# Patient Record
Sex: MALE | Race: BLACK OR AFRICAN AMERICAN | NOT HISPANIC OR LATINO | Employment: FULL TIME | URBAN - METROPOLITAN AREA
[De-identification: names, ages, dates, MRNs, and addresses within clinical notes are randomized per-mention and may not be internally consistent; named-entity substitution may affect disease eponyms.]

---

## 2022-07-26 ENCOUNTER — APPOINTMENT (OUTPATIENT)
Dept: RADIOLOGY | Facility: MEDICAL CENTER | Age: 47
DRG: 291 | End: 2022-07-26
Attending: EMERGENCY MEDICINE

## 2022-07-26 ENCOUNTER — HOSPITAL ENCOUNTER (INPATIENT)
Facility: MEDICAL CENTER | Age: 47
LOS: 1 days | DRG: 291 | End: 2022-07-27
Attending: EMERGENCY MEDICINE | Admitting: HOSPITALIST

## 2022-07-26 DIAGNOSIS — R06.00 DYSPNEA, UNSPECIFIED TYPE: ICD-10-CM

## 2022-07-26 DIAGNOSIS — E87.70 HYPERVOLEMIA, UNSPECIFIED HYPERVOLEMIA TYPE: ICD-10-CM

## 2022-07-26 DIAGNOSIS — I10 HYPERTENSION, UNSPECIFIED TYPE: ICD-10-CM

## 2022-07-26 DIAGNOSIS — I50.21 ACUTE SYSTOLIC HEART FAILURE (HCC): ICD-10-CM

## 2022-07-26 PROBLEM — R06.02 SHORTNESS OF BREATH: Status: ACTIVE | Noted: 2022-07-26

## 2022-07-26 PROBLEM — I16.0 HYPERTENSIVE URGENCY: Status: ACTIVE | Noted: 2022-07-26

## 2022-07-26 PROBLEM — R51.9 HEADACHE: Status: ACTIVE | Noted: 2022-07-26

## 2022-07-26 PROBLEM — F17.200 SMOKER: Status: ACTIVE | Noted: 2022-07-26

## 2022-07-26 PROBLEM — M79.89 LEG SWELLING: Status: ACTIVE | Noted: 2022-07-26

## 2022-07-26 LAB
ALBUMIN SERPL BCP-MCNC: 4.1 G/DL (ref 3.2–4.9)
ALBUMIN/GLOB SERPL: 1.4 G/DL
ALP SERPL-CCNC: 56 U/L (ref 30–99)
ALT SERPL-CCNC: 28 U/L (ref 2–50)
ANION GAP SERPL CALC-SCNC: 9 MMOL/L (ref 7–16)
AST SERPL-CCNC: 24 U/L (ref 12–45)
BASOPHILS # BLD AUTO: 0.7 % (ref 0–1.8)
BASOPHILS # BLD: 0.06 K/UL (ref 0–0.12)
BILIRUB SERPL-MCNC: 0.6 MG/DL (ref 0.1–1.5)
BUN SERPL-MCNC: 34 MG/DL (ref 8–22)
CALCIUM SERPL-MCNC: 8.9 MG/DL (ref 8.5–10.5)
CHLORIDE SERPL-SCNC: 107 MMOL/L (ref 96–112)
CO2 SERPL-SCNC: 26 MMOL/L (ref 20–33)
CREAT SERPL-MCNC: 1.34 MG/DL (ref 0.5–1.4)
EKG IMPRESSION: NORMAL
EOSINOPHIL # BLD AUTO: 0.4 K/UL (ref 0–0.51)
EOSINOPHIL NFR BLD: 4.7 % (ref 0–6.9)
ERYTHROCYTE [DISTWIDTH] IN BLOOD BY AUTOMATED COUNT: 43.8 FL (ref 35.9–50)
EST. AVERAGE GLUCOSE BLD GHB EST-MCNC: 117 MG/DL
FLUAV RNA SPEC QL NAA+PROBE: NEGATIVE
FLUBV RNA SPEC QL NAA+PROBE: NEGATIVE
GFR SERPLBLD CREATININE-BSD FMLA CKD-EPI: 66 ML/MIN/1.73 M 2
GLOBULIN SER CALC-MCNC: 2.9 G/DL (ref 1.9–3.5)
GLUCOSE SERPL-MCNC: 99 MG/DL (ref 65–99)
HBA1C MFR BLD: 5.7 % (ref 4–5.6)
HCT VFR BLD AUTO: 47.3 % (ref 42–52)
HGB BLD-MCNC: 15.7 G/DL (ref 14–18)
IMM GRANULOCYTES # BLD AUTO: 0.03 K/UL (ref 0–0.11)
IMM GRANULOCYTES NFR BLD AUTO: 0.4 % (ref 0–0.9)
LYMPHOCYTES # BLD AUTO: 2.1 K/UL (ref 1–4.8)
LYMPHOCYTES NFR BLD: 24.7 % (ref 22–41)
MCH RBC QN AUTO: 27.5 PG (ref 27–33)
MCHC RBC AUTO-ENTMCNC: 33.2 G/DL (ref 33.7–35.3)
MCV RBC AUTO: 83 FL (ref 81.4–97.8)
MONOCYTES # BLD AUTO: 0.79 K/UL (ref 0–0.85)
MONOCYTES NFR BLD AUTO: 9.3 % (ref 0–13.4)
NEUTROPHILS # BLD AUTO: 5.13 K/UL (ref 1.82–7.42)
NEUTROPHILS NFR BLD: 60.2 % (ref 44–72)
NRBC # BLD AUTO: 0 K/UL
NRBC BLD-RTO: 0 /100 WBC
NT-PROBNP SERPL IA-MCNC: 2164 PG/ML (ref 0–125)
PLATELET # BLD AUTO: 200 K/UL (ref 164–446)
PMV BLD AUTO: 10.1 FL (ref 9–12.9)
POTASSIUM SERPL-SCNC: 4.3 MMOL/L (ref 3.6–5.5)
PROT SERPL-MCNC: 7 G/DL (ref 6–8.2)
RBC # BLD AUTO: 5.7 M/UL (ref 4.7–6.1)
RSV RNA SPEC QL NAA+PROBE: NEGATIVE
SARS-COV-2 RNA RESP QL NAA+PROBE: NOTDETECTED
SODIUM SERPL-SCNC: 142 MMOL/L (ref 135–145)
SPECIMEN SOURCE: NORMAL
TROPONIN T SERPL-MCNC: 27 NG/L (ref 6–19)
TSH SERPL DL<=0.005 MIU/L-ACNC: 1.74 UIU/ML (ref 0.38–5.33)
WBC # BLD AUTO: 8.5 K/UL (ref 4.8–10.8)

## 2022-07-26 PROCEDURE — 85025 COMPLETE CBC W/AUTO DIFF WBC: CPT

## 2022-07-26 PROCEDURE — 93005 ELECTROCARDIOGRAM TRACING: CPT

## 2022-07-26 PROCEDURE — 84443 ASSAY THYROID STIM HORMONE: CPT

## 2022-07-26 PROCEDURE — 36415 COLL VENOUS BLD VENIPUNCTURE: CPT

## 2022-07-26 PROCEDURE — 0241U HCHG SARS-COV-2 COVID-19 NFCT DS RESP RNA 4 TRGT MIC: CPT

## 2022-07-26 PROCEDURE — 71275 CT ANGIOGRAPHY CHEST: CPT

## 2022-07-26 PROCEDURE — 71045 X-RAY EXAM CHEST 1 VIEW: CPT

## 2022-07-26 PROCEDURE — 700102 HCHG RX REV CODE 250 W/ 637 OVERRIDE(OP): Performed by: NURSE PRACTITIONER

## 2022-07-26 PROCEDURE — 84484 ASSAY OF TROPONIN QUANT: CPT

## 2022-07-26 PROCEDURE — 700111 HCHG RX REV CODE 636 W/ 250 OVERRIDE (IP): Performed by: EMERGENCY MEDICINE

## 2022-07-26 PROCEDURE — 83735 ASSAY OF MAGNESIUM: CPT

## 2022-07-26 PROCEDURE — 83880 ASSAY OF NATRIURETIC PEPTIDE: CPT

## 2022-07-26 PROCEDURE — 700111 HCHG RX REV CODE 636 W/ 250 OVERRIDE (IP): Performed by: NURSE PRACTITIONER

## 2022-07-26 PROCEDURE — 83036 HEMOGLOBIN GLYCOSYLATED A1C: CPT

## 2022-07-26 PROCEDURE — A9270 NON-COVERED ITEM OR SERVICE: HCPCS | Performed by: NURSE PRACTITIONER

## 2022-07-26 PROCEDURE — 96375 TX/PRO/DX INJ NEW DRUG ADDON: CPT

## 2022-07-26 PROCEDURE — 80053 COMPREHEN METABOLIC PANEL: CPT

## 2022-07-26 PROCEDURE — 96374 THER/PROPH/DIAG INJ IV PUSH: CPT

## 2022-07-26 PROCEDURE — 99285 EMERGENCY DEPT VISIT HI MDM: CPT

## 2022-07-26 PROCEDURE — C9803 HOPD COVID-19 SPEC COLLECT: HCPCS | Performed by: EMERGENCY MEDICINE

## 2022-07-26 PROCEDURE — 770020 HCHG ROOM/CARE - TELE (206)

## 2022-07-26 PROCEDURE — 700117 HCHG RX CONTRAST REV CODE 255: Performed by: EMERGENCY MEDICINE

## 2022-07-26 PROCEDURE — 93005 ELECTROCARDIOGRAM TRACING: CPT | Performed by: EMERGENCY MEDICINE

## 2022-07-26 PROCEDURE — 99223 1ST HOSP IP/OBS HIGH 75: CPT | Mod: FS | Performed by: NURSE PRACTITIONER

## 2022-07-26 RX ORDER — ACETAMINOPHEN 325 MG/1
650 TABLET ORAL EVERY 6 HOURS PRN
Status: DISCONTINUED | OUTPATIENT
Start: 2022-07-26 | End: 2022-07-26

## 2022-07-26 RX ORDER — HYDRALAZINE HYDROCHLORIDE 20 MG/ML
10 INJECTION INTRAMUSCULAR; INTRAVENOUS EVERY 4 HOURS PRN
Status: DISCONTINUED | OUTPATIENT
Start: 2022-07-26 | End: 2022-07-27 | Stop reason: HOSPADM

## 2022-07-26 RX ORDER — AMOXICILLIN 250 MG
2 CAPSULE ORAL 2 TIMES DAILY
Status: DISCONTINUED | OUTPATIENT
Start: 2022-07-26 | End: 2022-07-27 | Stop reason: HOSPADM

## 2022-07-26 RX ORDER — FUROSEMIDE 10 MG/ML
40 INJECTION INTRAMUSCULAR; INTRAVENOUS ONCE
Status: COMPLETED | OUTPATIENT
Start: 2022-07-26 | End: 2022-07-26

## 2022-07-26 RX ORDER — ONDANSETRON 4 MG/1
4 TABLET, ORALLY DISINTEGRATING ORAL EVERY 4 HOURS PRN
Status: DISCONTINUED | OUTPATIENT
Start: 2022-07-26 | End: 2022-07-27 | Stop reason: HOSPADM

## 2022-07-26 RX ORDER — POLYETHYLENE GLYCOL 3350 17 G/17G
1 POWDER, FOR SOLUTION ORAL
Status: DISCONTINUED | OUTPATIENT
Start: 2022-07-26 | End: 2022-07-27 | Stop reason: HOSPADM

## 2022-07-26 RX ORDER — ONDANSETRON 2 MG/ML
4 INJECTION INTRAMUSCULAR; INTRAVENOUS EVERY 4 HOURS PRN
Status: DISCONTINUED | OUTPATIENT
Start: 2022-07-26 | End: 2022-07-27 | Stop reason: HOSPADM

## 2022-07-26 RX ORDER — IBUPROFEN 800 MG/1
800-1600 TABLET ORAL 2 TIMES DAILY PRN
Status: ON HOLD | COMMUNITY
End: 2022-07-27

## 2022-07-26 RX ORDER — OXYCODONE HYDROCHLORIDE 5 MG/1
2.5 TABLET ORAL
Status: DISCONTINUED | OUTPATIENT
Start: 2022-07-26 | End: 2022-07-27 | Stop reason: HOSPADM

## 2022-07-26 RX ORDER — MORPHINE SULFATE 4 MG/ML
2 INJECTION INTRAVENOUS
Status: DISCONTINUED | OUTPATIENT
Start: 2022-07-26 | End: 2022-07-27 | Stop reason: HOSPADM

## 2022-07-26 RX ORDER — FUROSEMIDE 10 MG/ML
40 INJECTION INTRAMUSCULAR; INTRAVENOUS
Status: DISCONTINUED | OUTPATIENT
Start: 2022-07-26 | End: 2022-07-27

## 2022-07-26 RX ORDER — OXYCODONE HYDROCHLORIDE 5 MG/1
5 TABLET ORAL
Status: DISCONTINUED | OUTPATIENT
Start: 2022-07-26 | End: 2022-07-27 | Stop reason: HOSPADM

## 2022-07-26 RX ORDER — ACETAMINOPHEN 500 MG
1000 TABLET ORAL EVERY 6 HOURS PRN
Status: DISCONTINUED | OUTPATIENT
Start: 2022-07-31 | End: 2022-07-27 | Stop reason: HOSPADM

## 2022-07-26 RX ORDER — PROMETHAZINE HYDROCHLORIDE 25 MG/1
12.5-25 TABLET ORAL EVERY 4 HOURS PRN
Status: DISCONTINUED | OUTPATIENT
Start: 2022-07-26 | End: 2022-07-27 | Stop reason: HOSPADM

## 2022-07-26 RX ORDER — PROCHLORPERAZINE EDISYLATE 5 MG/ML
5-10 INJECTION INTRAMUSCULAR; INTRAVENOUS EVERY 4 HOURS PRN
Status: DISCONTINUED | OUTPATIENT
Start: 2022-07-26 | End: 2022-07-27 | Stop reason: HOSPADM

## 2022-07-26 RX ORDER — LISINOPRIL 20 MG/1
20 TABLET ORAL
Status: DISCONTINUED | OUTPATIENT
Start: 2022-07-26 | End: 2022-07-27 | Stop reason: HOSPADM

## 2022-07-26 RX ORDER — AMOXICILLIN 500 MG/1
500 CAPSULE ORAL 3 TIMES DAILY
Status: ON HOLD | COMMUNITY
End: 2022-07-27

## 2022-07-26 RX ORDER — BISACODYL 10 MG
10 SUPPOSITORY, RECTAL RECTAL
Status: DISCONTINUED | OUTPATIENT
Start: 2022-07-26 | End: 2022-07-27 | Stop reason: HOSPADM

## 2022-07-26 RX ORDER — LISINOPRIL 10 MG/1
10 TABLET ORAL DAILY
Status: ON HOLD | COMMUNITY
End: 2022-07-27

## 2022-07-26 RX ORDER — ACETAMINOPHEN 500 MG
1000 TABLET ORAL EVERY 6 HOURS
Status: DISCONTINUED | OUTPATIENT
Start: 2022-07-26 | End: 2022-07-27 | Stop reason: HOSPADM

## 2022-07-26 RX ORDER — PROMETHAZINE HYDROCHLORIDE 25 MG/1
12.5-25 SUPPOSITORY RECTAL EVERY 4 HOURS PRN
Status: DISCONTINUED | OUTPATIENT
Start: 2022-07-26 | End: 2022-07-27 | Stop reason: HOSPADM

## 2022-07-26 RX ADMIN — ACETAMINOPHEN 1000 MG: 325 TABLET, FILM COATED ORAL at 23:56

## 2022-07-26 RX ADMIN — FUROSEMIDE 40 MG: 10 INJECTION, SOLUTION INTRAMUSCULAR; INTRAVENOUS at 18:35

## 2022-07-26 RX ADMIN — LISINOPRIL 20 MG: 20 TABLET ORAL at 14:58

## 2022-07-26 RX ADMIN — IOHEXOL 70 ML: 350 INJECTION, SOLUTION INTRAVENOUS at 13:31

## 2022-07-26 RX ADMIN — HYDRALAZINE HYDROCHLORIDE 10 MG: 20 INJECTION INTRAMUSCULAR; INTRAVENOUS at 15:42

## 2022-07-26 RX ADMIN — ACETAMINOPHEN 1000 MG: 325 TABLET, FILM COATED ORAL at 15:57

## 2022-07-26 RX ADMIN — FUROSEMIDE 40 MG: 10 INJECTION, SOLUTION INTRAMUSCULAR; INTRAVENOUS at 12:09

## 2022-07-26 RX ADMIN — RIVAROXABAN 10 MG: 10 TABLET, FILM COATED ORAL at 18:35

## 2022-07-26 RX ADMIN — METOPROLOL TARTRATE 25 MG: 25 TABLET, FILM COATED ORAL at 14:58

## 2022-07-26 ASSESSMENT — PAIN DESCRIPTION - PAIN TYPE: TYPE: ACUTE PAIN

## 2022-07-26 ASSESSMENT — ENCOUNTER SYMPTOMS
FEVER: 0
PSYCHIATRIC NEGATIVE: 1
HEADACHES: 1
VOMITING: 0
SEIZURES: 1
ABDOMINAL PAIN: 0
EYES NEGATIVE: 1
ORTHOPNEA: 1
CHILLS: 0
MYALGIAS: 1
NAUSEA: 0
DIZZINESS: 1
SHORTNESS OF BREATH: 1
GASTROINTESTINAL NEGATIVE: 1

## 2022-07-26 ASSESSMENT — FIBROSIS 4 INDEX: FIB4 SCORE: 1.04

## 2022-07-26 NOTE — ASSESSMENT & PLAN NOTE
- Diagnosed at [facility] 3 weeks ago; was initiated on lisinopril 10 mg  -We will add diuretics, beta-blocker and increase lisinopril dose monitor every shift  -

## 2022-07-26 NOTE — ED PROVIDER NOTES
ED Provider Note    Scribed for Georgina Thompson M.D. by Vashti Johnson. 7/26/2022, 12:04 PM.    Primary care provider: Pcp Pt States None  Means of arrival: Walk-in  History obtained from: Patient  History limited by: None    CHIEF COMPLAINT  Chief Complaint   Patient presents with   • Shortness of Breath     Constant, worse when lying down   • Hypertension   • Headache       HPI  Gage Garcia is a 46 y.o. male who presents to the Emergency Department for further evaluation of shortness of breath onset 3 weeks ago. The patient states he was last seen at [facility] 3.5 weeks ago for a sore throat and a fever of 103 °F. He notes that at [facility] his blood pressure was 229/198. He states he was diagnosed with strep throat and hypertension. He notes he was given antibiotics, steroids, and Lisinopril 10 mg daily. He states he finished his strep medications and is currently taking his Lisinopril every day. He states that he took an at home COVID test today, which was negative. He states he also has headaches, polyuria, polydipsia, and increased thirst. He states he has swollen legs, this has been intermittent over the last year.  The patient states his tongue feels dry and has been having more heartburn than normal. Denies using any other medications. Family history of Congestive Heart Failure and diabetes. He states he has sleep apnea and gained 110 pounds last 12 months.      REVIEW OF SYSTEMS  Review of Systems   Constitutional: Negative for chills and fever.   Respiratory: Positive for shortness of breath.    Cardiovascular: Negative for chest pain.   Gastrointestinal: Negative for abdominal pain, nausea and vomiting.   Genitourinary: Positive for frequency.   All other systems reviewed and are negative.      PAST MEDICAL HISTORY   has a past medical history of Hypertension, Obesity, Smoker, and Strep pharyngitis.None pertinent    SURGICAL HISTORY  patient denies any surgical history    SOCIAL HISTORY  Social  "History     Tobacco Use   • Smoking status: Current Every Day Smoker     Packs/day: 1.00   • Smokeless tobacco: Never Used   Vaping Use   • Vaping Use: Every day   • Substances: Nicotine, THC, CBD   Substance Use Topics   • Alcohol use: Yes     Alcohol/week: 1.2 oz     Types: 2 Shots of liquor per week   • Drug use: Yes     Types: Inhaled, Marijuana     Comment: thc      Social History     Substance and Sexual Activity   Drug Use Yes   • Types: Inhaled, Marijuana    Comment: thc       FAMILY HISTORY  Family History   Problem Relation Age of Onset   • Heart Disease Mother    • Diabetes Mother    • Cancer Mother        CURRENT MEDICATIONS  Home Medications     Reviewed by Michael Modi (Pharmacy Tech) on 07/26/22 at 1411  Med List Status: Complete   Medication Last Dose Status   amoxicillin (AMOXIL) 500 MG Cap COMPLETED Active   ibuprofen (MOTRIN) 800 MG Tab 7/25/2022 Active   lisinopril (PRINIVIL) 10 MG Tab 7/25/2022 Active                ALLERGIES  No Known Allergies    PHYSICAL EXAM  VITAL SIGNS: BP (!) 188/139   Pulse (!) 107   Temp 35.8 °C (96.5 °F) (Temporal)   Resp (!) 26   Ht 2.083 m (6' 10\")   Wt (!) 169 kg (372 lb 9.2 oz)   SpO2 94%   BMI 38.96 kg/m²   Vitals reviewed by myself.  Nursing note and vitals reviewed.  Constitutional: Well-developed and well-nourished. No acute distress.   HENT: Head is normocephalic and atraumatic.  Eyes: extra-ocular movements intact  Cardiovascular: tachcyardic rate and  regular rhythm. No murmur heard.  Pulmonary/Chest: Breath sounds normal. No wheezes or rales.   Abdominal: Soft and non-tender. No distention.    Musculoskeletal: Extremities exhibit normal range of motion without edema or tenderness.   Neurological: Awake and alert  Skin: Skin is warm and dry. No rash.     DIAGNOSTIC STUDIES /  LABS  Labs Reviewed   CBC WITH DIFFERENTIAL - Abnormal; Notable for the following components:       Result Value    MCHC 33.2 (*)     All other components within normal " limits   COMP METABOLIC PANEL - Abnormal; Notable for the following components:    Bun 34 (*)     All other components within normal limits   TROPONIN - Abnormal; Notable for the following components:    Troponin T 27 (*)     All other components within normal limits   PROBRAIN NATRIURETIC PEPTIDE, NT - Abnormal; Notable for the following components:    NT-proBNP 2164 (*)     All other components within normal limits   HEMOGLOBIN A1C - Abnormal; Notable for the following components:    Glycohemoglobin 5.7 (*)     All other components within normal limits   ESTIMATED GFR   TSH WITH REFLEX TO FT4   COV-2, FLU A/B, AND RSV BY PCR (CEPHEID)   MAGNESIUM       All labs reviewed by me.    EKG Interpretation:  Interpreted by myself    12 Lead EKG interpreted by me to show:  EKG at 7:13 AM: Sinus tachycardia, heart rate 103, left axis deviation, intervals notable for prolonged QT of 492, , , no acute ST-T segment changes, no evidence of acute arrhythmia or ischemia  My impression of this EKG: Does not indicate ischemia or arrhythmia at this time.    RADIOLOGY  CT-CTA CHEST PULMONARY ARTERY W/ RECONS   Final Result      1. No acute or chronic pulmonary embolism.   2. Cardiomegaly with a small pericardial effusion.   3. Enlarged thyroid gland.   4. Hepatomegaly and hepatic steatosis.         DX-CHEST-PORTABLE (1 VIEW)   Final Result      No acute cardiac or pulmonary abnormalities are identified.        The radiologist's interpretation of all radiological studies have been reviewed by me.      REASSESSMENT  12:07 PM Patient was evaluated at bedside. Discussed ordering lab work, an EKG, and radiology with the patient. Patient verbalizes understanding and agreement to this plan of care.      2:12 PM I discussed the patient's case and the above findings with Dr. Vega (Hospitalist) who agrees to evaluate the patient for admission.     2:18 PM Patient was reevaluated at bedside. Discussed lab  and radiology  results  with the patient and informed them of my plan for admission. Patient verbalizes understanding and agreement to this plan of care.        DISPOSITION:  2:57 PM Spoke with Dr. Vega regarding the patient.  Patient will be hospitalized in guarded condition.     COURSE & MEDICAL DECISION MAKING  Nursing notes, VS, PMSFHx reviewed in chart.    Patient is a 46-year-old male who comes in for evaluation of dyspnea.  Differential diagnosis includes fluid overload, heart failure, acute coronary syndrome, arrhythmia.  Diagnostic work-up includes labs, chest x-ray.  Review of records also demonstrates that patient did have a positive D-dimer at outside facility but did not receive further work-up as he eloped prior to CT imaging.  He is tachycardic here and therefore will obtain CTA to assess for pulmonary embolism.    Patient's initial vitals notable for tachycardia and hypertension.  EKG returns and demonstrates no evidence of acute arrhythmia or ischemia.  Troponin is mildly elevated at 27 and BNP is elevated at 2000 consistent with fluid overload, troponin elevation likely secondary to fluid overload.  Patient started on diuresis with Lasix.  Labs are otherwise unremarkable.  CTA demonstrates no evidence of pulmonary embolism, there is cardiomegaly with small pericardial effusion.  Patient will be hospitalized for further cardiac work-up.  Discussed the case with Dr. Vega who has accepted patient for hospitalization.  Patient is in guarded condition.      FINAL IMPRESSION  1. Hypertension, unspecified type    2. Dyspnea, unspecified type    3. Hypervolemia, unspecified hypervolemia type          Vashti STERN), ela scribing for, and in the presence of, Georgina Thompson M.D..    Electronically signed by: Vashti Larsen), 7/26/2022    IGeorgina M.D. personally performed the services described in this documentation, as scribed by Vashti Johnson in my presence, and it is both accurate and  complete.    The note accurately reflects work and decisions made by me.  Georgina Thompson M.D.  7/26/2022  9:06 PM

## 2022-07-26 NOTE — ED TRIAGE NOTES
"Chief Complaint   Patient presents with   • Shortness of Breath     Constant, worse when lying down   • Hypertension   • Headache     Pt complaining of worsening SOB, hypertension and headache. Pt states he was seen at [facility] for this issue a few weeks ago and diagnosed with strep. Pt reports he does not feel sick anymore but feels short of breath all the time now. Pt also notes to have gained somewhere around 100lbs within the last year. Protocol ordered.     Pt is alert and oriented, speaking in full sentences, follows commands and responds appropriately to questions.      Pt placed in lobby. Pt educated on triage process and apologized for wait time. Pt encouraged to alert staff for any changes.     Patient and staff wearing appropriate PPE      BP (!) 219/158   Pulse (!) 113   Temp 35.8 °C (96.5 °F) (Temporal)   Resp 18   Ht 2.083 m (6' 10\")   Wt (!) 169 kg (372 lb 9.2 oz)   SpO2 95%       "

## 2022-07-26 NOTE — ED TRIAGE NOTES
PT [id number]. Apologized for wait times and thanked them for their patience. Advised them to please let us know if anything changes in their condition.

## 2022-07-26 NOTE — ASSESSMENT & PLAN NOTE
- Pulmonary embolism versus heart failure  -CTA pulmonary negative for any PE  -We will order echocardiogram  -Heart failure bundle medication initiated  -Jeff patient

## 2022-07-26 NOTE — H&P
Hospital Medicine History & Physical Note    Date of Service  7/26/2022    Primary Care Physician  Pcp Pt States None    Consultants  NONE    Specialist Names: n/a    Code Status  Full Code    Chief Complaint  Chief Complaint   Patient presents with   • Shortness of Breath     Constant, worse when lying down   • Hypertension   • Headache       History of Presenting Illness  Gage Garcia is a 46 y.o. male with a PMHx of hypertension, strep throat, who presented 7/26/2022 with shortness of breath and high blood pressure.    Patient states that he was at [facility] approximately 3 and half weeks ago due to sore throat and high fevers of 103F.  He was then diagnosed with high blood pressure at [facility] of which she was prescribed lisinopril.  Patient was also given amoxicillin, and steroids to help with his strep throat.  Patient finished the course of his antibiotic therapy and was staying compliant with his blood pressure medication.  Patient progressively had gotten worse with feeling weak, headaches, and shortness of breath.  He took a home COVID test of which this noted to be negative.  Patient also reports polyuria, polydipsia, and increased thirst.  He also indicates having bilateral lower extremity swelling which normally happens to him when he does not exercise regularly.  Patient endorses that his tongue feels dry and has been experiencing more heartburn than normal.  Patient denies any other medications besides the prescribed medication prescribed 3-1/2 weeks ago.    Patient endorses having 110 pounds weight gain.  He has a significant family history of congestive heart failure and diabetes in her maternal side.  Patient also smokes cigarettes daily approximately a pack per day to 1.5 days.  Patient also endorses smoking marijuana on a daily basis.  Patient currently works at the [employer] working [employer] shift.    Patient had gone home from his work at 1 AM due to not feeling well.  He waited for his  roommate around 6 AM to be taken to the ER due to increased shortness of breath.    In ER, notable lab findings noted hemoglobin A1c at 5.7, initial troponin T 27, proBNP 2164.  TSH 1.740.  Influenza A/B, RSV, COVID all negative.  CTA pulmonary was obtained which noted no acute or chronic pulmonary embolism, cardiomegaly with small pericardial effusion, enlarged thyroid gland, and hepatomegaly and hepatic steatosis.    Patient was admitted under observation for cardiac work-up.  Patient will be prescribed beta-blocker, increase in ACE inhibitor's, and will place on diuretics.  An echocardiogram will also be ordered.  Telemetry monitoring will also be placed.      I discussed the plan of care with patient, bedside RN and charge RN.    Review of Systems  Review of Systems   Constitutional: Positive for malaise/fatigue. Negative for chills and fever.   HENT: Negative.    Eyes: Negative.    Respiratory: Positive for shortness of breath.    Cardiovascular: Positive for orthopnea and leg swelling.   Gastrointestinal: Negative.    Genitourinary: Negative.    Musculoskeletal: Positive for myalgias.   Skin: Negative.    Neurological: Positive for dizziness, seizures and headaches.   Psychiatric/Behavioral: Negative.        Past Medical History   has a past medical history of Hypertension, Obesity, Smoker, and Strep pharyngitis.    Surgical History   has no past surgical history on file.     Family History  family history includes Cancer in his mother; Diabetes in his mother; Heart Disease in his mother.   Family history reviewed with patient. There is family history that is pertinent to the chief complaint.     Social History   reports that he has been smoking. He has been smoking about 1.00 pack per day. He has never used smokeless tobacco. He reports current alcohol use of about 1.2 oz of alcohol per week. He reports current drug use. Drugs: Inhaled and Marijuana.    Allergies  No Known Allergies    Medications  Prior to  Admission Medications   Prescriptions Last Dose Informant Patient Reported? Taking?   amoxicillin (AMOXIL) 500 MG Cap COMPLETED at COMPLETED Patient Yes Yes   Sig: Take 500 mg by mouth 3 times a day. 7 day course   ibuprofen (MOTRIN) 800 MG Tab 7/25/2022 at UNK Patient Yes Yes   Sig: Take 800-1,600 mg by mouth 2 times a day as needed. Indications: Pain   lisinopril (PRINIVIL) 10 MG Tab 7/25/2022 at AFTERNOON Patient Yes Yes   Sig: Take 10 mg by mouth every day.      Facility-Administered Medications: None       Physical Exam  Temp:  [35.8 °C (96.5 °F)] 35.8 °C (96.5 °F)  Pulse:  [104-118] 104  Resp:  [14-26] 14  BP: (180-219)/(113-158) 180/113  SpO2:  [84 %-96 %] 92 %  Blood Pressure: (!) 180/113   Temperature: 35.8 °C (96.5 °F)   Pulse: (!) 104   Respiration: 14   Pulse Oximetry: 92 %       Physical Exam  Vitals and nursing note reviewed.   Constitutional:       Appearance: Normal appearance.   HENT:      Head: Normocephalic and atraumatic.      Nose: Nose normal.      Mouth/Throat:      Mouth: Mucous membranes are moist.      Pharynx: Oropharynx is clear.   Eyes:      Pupils: Pupils are equal, round, and reactive to light.   Cardiovascular:      Rate and Rhythm: Normal rate and regular rhythm.      Pulses: Normal pulses.      Heart sounds: Normal heart sounds.   Pulmonary:      Breath sounds: Normal breath sounds.   Abdominal:      General: Bowel sounds are normal.      Palpations: Abdomen is soft.   Musculoskeletal:         General: Tenderness present.      Cervical back: Normal range of motion.   Skin:     General: Skin is dry.      Capillary Refill: Capillary refill takes 2 to 3 seconds.   Neurological:      Mental Status: He is alert. Mental status is at baseline.         Laboratory:  Recent Labs     07/26/22  0727   WBC 8.5   RBC 5.70   HEMOGLOBIN 15.7   HEMATOCRIT 47.3   MCV 83.0   MCH 27.5   MCHC 33.2*   RDW 43.8   PLATELETCT 200   MPV 10.1     Recent Labs     07/26/22  0727   SODIUM 142   POTASSIUM 4.3    CHLORIDE 107   CO2 26   GLUCOSE 99   BUN 34*   CREATININE 1.34   CALCIUM 8.9     Recent Labs     07/26/22 0727   ALTSGPT 28   ASTSGOT 24   ALKPHOSPHAT 56   TBILIRUBIN 0.6   GLUCOSE 99         Recent Labs     07/26/22 0727   NTPROBNP 2164*         Recent Labs     07/26/22 0727   TROPONINT 27*       Imaging:  CT-CTA CHEST PULMONARY ARTERY W/ RECONS   Final Result      1. No acute or chronic pulmonary embolism.   2. Cardiomegaly with a small pericardial effusion.   3. Enlarged thyroid gland.   4. Hepatomegaly and hepatic steatosis.         DX-CHEST-PORTABLE (1 VIEW)   Final Result      No acute cardiac or pulmonary abnormalities are identified.          X-Ray:  I have personally reviewed the images and compared with prior images.    Assessment/Plan:  Justification for Admission Status  I anticipate this patient is appropriate for observation status at this time because CHF work up    Patient will need a  bed on EMERGENCY service .  The need is secondary to SOB.    Headache  Assessment & Plan  - Likely due to hypertension  -Control BP    Smoker  Assessment & Plan  - Patient counseled and educated in regards to nicotine use  -Patient denies any nicotine placement at this time    Shortness of breath  Assessment & Plan  - Pulmonary embolism versus heart failure  -CTA pulmonary negative for any PE  -We will order echocardiogram  -Heart failure bundle medication initiated  -Diurese patient    Leg swelling  Assessment & Plan  - Has progressively gotten worse this past few months  -Associated with weakness    Hypertension  Assessment & Plan  - Diagnosed at [facility] 3 weeks ago; was initiated on lisinopril 10 mg  -We will add diuretics, beta-blocker and increase lisinopril dose monitor every shift  -      VTE prophylaxis: Xarelto 10 mg daily as prophylaxis     ========================================================================================================  Please note that this dictation was created using voice  recognition software. I have made every reasonable attempt to correct obvious errors, but there may be errors of grammar and possibly content that I did not discover before finalizing the note.    Electronically signed by:  DEMARIO Connors, MSN, APRN, FNP-C  Hospitalist Services  [facility]  [phone]  [email or web address]  07/26/22                    [id number]

## 2022-07-26 NOTE — ED NOTES
Med rec completed per pt   Allergies reviewed    Pt completed a 7 day course of Amoxicillin a few weeks ago

## 2022-07-26 NOTE — ASSESSMENT & PLAN NOTE
- Patient counseled and educated in regards to nicotine use  -Patient denies any nicotine placement at this time

## 2022-07-27 ENCOUNTER — APPOINTMENT (OUTPATIENT)
Dept: CARDIOLOGY | Facility: MEDICAL CENTER | Age: 47
DRG: 291 | End: 2022-07-27
Attending: NURSE PRACTITIONER

## 2022-07-27 ENCOUNTER — PHARMACY VISIT (OUTPATIENT)
Dept: PHARMACY | Facility: MEDICAL CENTER | Age: 47
End: 2022-07-27
Payer: COMMERCIAL

## 2022-07-27 VITALS
WEIGHT: 315 LBS | RESPIRATION RATE: 18 BRPM | HEIGHT: 78 IN | SYSTOLIC BLOOD PRESSURE: 149 MMHG | OXYGEN SATURATION: 96 % | HEART RATE: 93 BPM | TEMPERATURE: 97.1 F | BODY MASS INDEX: 36.45 KG/M2 | DIASTOLIC BLOOD PRESSURE: 100 MMHG

## 2022-07-27 PROBLEM — R51.9 HEADACHE: Status: RESOLVED | Noted: 2022-07-26 | Resolved: 2022-07-27

## 2022-07-27 PROBLEM — R06.02 SHORTNESS OF BREATH: Status: RESOLVED | Noted: 2022-07-26 | Resolved: 2022-07-27

## 2022-07-27 PROBLEM — M79.89 LEG SWELLING: Status: RESOLVED | Noted: 2022-07-26 | Resolved: 2022-07-27

## 2022-07-27 PROBLEM — I16.0 HYPERTENSIVE URGENCY: Status: RESOLVED | Noted: 2022-07-26 | Resolved: 2022-07-27

## 2022-07-27 LAB
ANION GAP SERPL CALC-SCNC: 10 MMOL/L (ref 7–16)
BASOPHILS # BLD AUTO: 0.5 % (ref 0–1.8)
BASOPHILS # BLD: 0.04 K/UL (ref 0–0.12)
BUN SERPL-MCNC: 32 MG/DL (ref 8–22)
CALCIUM SERPL-MCNC: 9 MG/DL (ref 8.5–10.5)
CHLORIDE SERPL-SCNC: 104 MMOL/L (ref 96–112)
CHOLEST SERPL-MCNC: 196 MG/DL (ref 100–199)
CO2 SERPL-SCNC: 25 MMOL/L (ref 20–33)
CREAT SERPL-MCNC: 1.23 MG/DL (ref 0.5–1.4)
EOSINOPHIL # BLD AUTO: 0.37 K/UL (ref 0–0.51)
EOSINOPHIL NFR BLD: 4.2 % (ref 0–6.9)
ERYTHROCYTE [DISTWIDTH] IN BLOOD BY AUTOMATED COUNT: 42.7 FL (ref 35.9–50)
GFR SERPLBLD CREATININE-BSD FMLA CKD-EPI: 73 ML/MIN/1.73 M 2
GLUCOSE SERPL-MCNC: 100 MG/DL (ref 65–99)
HCT VFR BLD AUTO: 46.3 % (ref 42–52)
HDLC SERPL-MCNC: 39 MG/DL
HGB BLD-MCNC: 16 G/DL (ref 14–18)
IMM GRANULOCYTES # BLD AUTO: 0.04 K/UL (ref 0–0.11)
IMM GRANULOCYTES NFR BLD AUTO: 0.5 % (ref 0–0.9)
LDLC SERPL CALC-MCNC: 122 MG/DL
LV EJECT FRACT  99904: 30
LV EJECT FRACT MOD 2C 99903: 33.55
LV EJECT FRACT MOD 4C 99902: 30.68
LV EJECT FRACT MOD BP 99901: 32.32
LYMPHOCYTES # BLD AUTO: 2.29 K/UL (ref 1–4.8)
LYMPHOCYTES NFR BLD: 26.2 % (ref 22–41)
MAGNESIUM SERPL-MCNC: 2 MG/DL (ref 1.5–2.5)
MCH RBC QN AUTO: 27.9 PG (ref 27–33)
MCHC RBC AUTO-ENTMCNC: 34.6 G/DL (ref 33.7–35.3)
MCV RBC AUTO: 80.8 FL (ref 81.4–97.8)
MONOCYTES # BLD AUTO: 0.81 K/UL (ref 0–0.85)
MONOCYTES NFR BLD AUTO: 9.3 % (ref 0–13.4)
NEUTROPHILS # BLD AUTO: 5.19 K/UL (ref 1.82–7.42)
NEUTROPHILS NFR BLD: 59.3 % (ref 44–72)
NRBC # BLD AUTO: 0 K/UL
NRBC BLD-RTO: 0 /100 WBC
PLATELET # BLD AUTO: 177 K/UL (ref 164–446)
PMV BLD AUTO: 9.9 FL (ref 9–12.9)
POTASSIUM SERPL-SCNC: 3.7 MMOL/L (ref 3.6–5.5)
RBC # BLD AUTO: 5.73 M/UL (ref 4.7–6.1)
SODIUM SERPL-SCNC: 139 MMOL/L (ref 135–145)
TRIGL SERPL-MCNC: 177 MG/DL (ref 0–149)
WBC # BLD AUTO: 8.7 K/UL (ref 4.8–10.8)

## 2022-07-27 PROCEDURE — 93306 TTE W/DOPPLER COMPLETE: CPT | Mod: 26 | Performed by: INTERNAL MEDICINE

## 2022-07-27 PROCEDURE — A9270 NON-COVERED ITEM OR SERVICE: HCPCS | Performed by: NURSE PRACTITIONER

## 2022-07-27 PROCEDURE — 700102 HCHG RX REV CODE 250 W/ 637 OVERRIDE(OP): Performed by: NURSE PRACTITIONER

## 2022-07-27 PROCEDURE — 80048 BASIC METABOLIC PNL TOTAL CA: CPT

## 2022-07-27 PROCEDURE — 93306 TTE W/DOPPLER COMPLETE: CPT

## 2022-07-27 PROCEDURE — 700111 HCHG RX REV CODE 636 W/ 250 OVERRIDE (IP): Performed by: NURSE PRACTITIONER

## 2022-07-27 PROCEDURE — 85025 COMPLETE CBC W/AUTO DIFF WBC: CPT

## 2022-07-27 PROCEDURE — 80061 LIPID PANEL: CPT

## 2022-07-27 PROCEDURE — RXMED WILLOW AMBULATORY MEDICATION CHARGE: Performed by: NURSE PRACTITIONER

## 2022-07-27 PROCEDURE — 99239 HOSP IP/OBS DSCHRG MGMT >30: CPT | Mod: FS | Performed by: NURSE PRACTITIONER

## 2022-07-27 RX ORDER — ATORVASTATIN CALCIUM 10 MG/1
20 TABLET, FILM COATED ORAL NIGHTLY
Qty: 60 TABLET | Refills: 0 | Status: SHIPPED | OUTPATIENT
Start: 2022-07-27 | End: 2022-08-26

## 2022-07-27 RX ORDER — LISINOPRIL 20 MG/1
20 TABLET ORAL DAILY
Qty: 30 TABLET | Refills: 0 | Status: SHIPPED | OUTPATIENT
Start: 2022-07-28 | End: 2022-08-27

## 2022-07-27 RX ORDER — ASPIRIN 81 MG/1
81 TABLET, CHEWABLE ORAL DAILY
Qty: 30 TABLET | Refills: 0 | Status: SHIPPED | OUTPATIENT
Start: 2022-07-27 | End: 2022-08-26

## 2022-07-27 RX ORDER — ATORVASTATIN CALCIUM 20 MG/1
20 TABLET, FILM COATED ORAL EVERY EVENING
Status: DISCONTINUED | OUTPATIENT
Start: 2022-07-27 | End: 2022-07-27 | Stop reason: HOSPADM

## 2022-07-27 RX ORDER — ATORVASTATIN CALCIUM 20 MG/1
20 TABLET, FILM COATED ORAL EVERY EVENING
Qty: 30 TABLET | Refills: 0 | Status: SHIPPED | OUTPATIENT
Start: 2022-07-27 | End: 2022-08-26

## 2022-07-27 RX ORDER — LISINOPRIL 20 MG/1
20 TABLET ORAL DAILY
Qty: 30 TABLET | Refills: 0 | Status: SHIPPED | OUTPATIENT
Start: 2022-07-27 | End: 2022-08-26

## 2022-07-27 RX ORDER — SPIRONOLACTONE 25 MG/1
25 TABLET ORAL DAILY
Qty: 30 TABLET | Refills: 0 | Status: SHIPPED | OUTPATIENT
Start: 2022-07-27 | End: 2022-08-26

## 2022-07-27 RX ORDER — POTASSIUM CHLORIDE 20 MEQ/1
40 TABLET, EXTENDED RELEASE ORAL DAILY
Status: DISCONTINUED | OUTPATIENT
Start: 2022-07-27 | End: 2022-07-27 | Stop reason: HOSPADM

## 2022-07-27 RX ORDER — FUROSEMIDE 20 MG/1
20 TABLET ORAL 2 TIMES DAILY
Qty: 60 TABLET | Refills: 0 | Status: SHIPPED | OUTPATIENT
Start: 2022-07-27 | End: 2022-08-26

## 2022-07-27 RX ORDER — FUROSEMIDE 10 MG/ML
20 INJECTION INTRAMUSCULAR; INTRAVENOUS
Status: DISCONTINUED | OUTPATIENT
Start: 2022-07-27 | End: 2022-07-27 | Stop reason: HOSPADM

## 2022-07-27 RX ORDER — IPRATROPIUM BROMIDE AND ALBUTEROL SULFATE 2.5; .5 MG/3ML; MG/3ML
3 SOLUTION RESPIRATORY (INHALATION)
Status: DISCONTINUED | OUTPATIENT
Start: 2022-07-27 | End: 2022-07-27 | Stop reason: HOSPADM

## 2022-07-27 RX ADMIN — LISINOPRIL 20 MG: 20 TABLET ORAL at 05:23

## 2022-07-27 RX ADMIN — POTASSIUM CHLORIDE 40 MEQ: 1500 TABLET, EXTENDED RELEASE ORAL at 10:02

## 2022-07-27 RX ADMIN — HYDRALAZINE HYDROCHLORIDE 10 MG: 20 INJECTION INTRAMUSCULAR; INTRAVENOUS at 03:23

## 2022-07-27 RX ADMIN — ACETAMINOPHEN 1000 MG: 325 TABLET, FILM COATED ORAL at 05:23

## 2022-07-27 RX ADMIN — FUROSEMIDE 40 MG: 10 INJECTION, SOLUTION INTRAMUSCULAR; INTRAVENOUS at 05:23

## 2022-07-27 RX ADMIN — METOPROLOL TARTRATE 25 MG: 25 TABLET, FILM COATED ORAL at 05:23

## 2022-07-27 ASSESSMENT — COPD QUESTIONNAIRES
DURING THE PAST 4 WEEKS HOW MUCH DID YOU FEEL SHORT OF BREATH: SOME OF THE TIME
DO YOU EVER COUGH UP ANY MUCUS OR PHLEGM?: YES, A FEW DAYS A WEEK OR MONTH
HAVE YOU SMOKED AT LEAST 100 CIGARETTES IN YOUR ENTIRE LIFE: YES
COPD SCREENING SCORE: 4

## 2022-07-27 ASSESSMENT — PAIN DESCRIPTION - PAIN TYPE: TYPE: ACUTE PAIN

## 2022-07-27 ASSESSMENT — FIBROSIS 4 INDEX: FIB4 SCORE: 1.18

## 2022-07-27 NOTE — HOSPITAL COURSE
Gage Garcia is a 46 y.o. male with a PMHx of hypertension, strep throat, who presented 7/26/2022 with shortness of breath and high blood pressure.     Patient states that he was at [facility] approximately 3 and half weeks ago due to sore throat and high fevers of 103F.  He was then diagnosed with high blood pressure at [facility] of which she was prescribed lisinopril.  Patient was also given amoxicillin, and steroids to help with his strep throat.  Patient finished the course of his antibiotic therapy and was staying compliant with his blood pressure medication.  Patient progressively had gotten worse with feeling weak, headaches, and shortness of breath.  He took a home COVID test of which this noted to be negative.  Patient also reports polyuria, polydipsia, and increased thirst.  He also indicates having bilateral lower extremity swelling which normally happens to him when he does not exercise regularly.  Patient endorses that his tongue feels dry and has been experiencing more heartburn than normal.  Patient denies any other medications besides the prescribed medication prescribed 3-1/2 weeks ago.     Patient endorses having 110 pounds weight gain.  He has a significant family history of congestive heart failure and diabetes in her maternal side.  Patient also smokes cigarettes daily approximately a pack per day to 1.5 days.  Patient also endorses smoking marijuana on a daily basis.  Patient currently works at the [employer] working [employer] shift.     Patient had gone home from his work at 1 AM due to not feeling well.  He waited for his roommate around 6 AM to be taken to the ER due to increased shortness of breath.     In ER, notable lab findings noted hemoglobin A1c at 5.7, initial troponin T 27, proBNP 2164.  TSH 1.740.  Influenza A/B, RSV, COVID all negative.  CTA pulmonary was obtained which noted no acute or chronic pulmonary embolism, cardiomegaly with small pericardial effusion, enlarged thyroid gland,  and hepatomegaly and hepatic steatosis.     Patient was admitted under observation for cardiac work-up.  Patient will be prescribed beta-blocker, increase in ACE inhibitor's, and will place on diuretics.  An echocardiogram will also be ordered.  Telemetry monitoring will also be placed.    Echocardiogram was obtained which noted left ventricular ejection fraction visually estimated at 30% and unable to estimate right ventricular systolic pressure due to inadequate tricuspid jet regurgitant jet.  Patient was weighed daily during this hospital stay which noted patient in net water weight loss of 30 pounds.    Patient discussed imaging results along with plan of care for heart failure management.  Patient will place on metoprolol, ACE inhibitor's, Lasix, and spironolactone.  Patient added atorvastatin.  Patient referred to establish with a PCP, follow-up with cardiology with heart failure program, and sleep study.  Patient counseled and educated in regards to smoking cessation of cigarettes and marijuana.  Patient also given education in regards to heart failure diet with low-sodium and low-fat.  All questions and concerns answered prior to being discharged.  Patient discharged home.

## 2022-07-27 NOTE — CARE PLAN
Problem: Knowledge Deficit - Standard  Goal: Patient and family/care givers will demonstrate understanding of plan of care, disease process/condition, diagnostic tests and medications  Outcome: Progressing     The patient is Stable - Low risk of patient condition declining or worsening    Shift Goals  Clinical Goals: diurese, ambulate, echo  Patient Goals: rest, diurese  Family Goals: N/A Family Not Present    Progress made toward(s) clinical / shift goals:  patient is resting comfortably close to rest room. Patient was educated on the tests and treatments and care plan for the day.     Patient is not progressing towards the following goals:

## 2022-07-27 NOTE — DOCUMENTATION QUERY
[facility]                                                                       Query Response Note      PATIENT:               GEETHA BLANCO  ACCT #:                  [id number]  MRN:                     [id number]  :                      1975  ADMIT DATE:       2022 11:44 AM  DISCH DATE:          RESPONDING  PROVIDER #:        [id number]           QUERY TEXT:    Hypertension is documented in the medical record.  Please specify the type of hypertension.    NOTE:  If an appropriate response is not listed below, please respond with a new note.    The patient's Clinical Indicators include:  Patient with documented uncontrolled HTN  Clinical Indicators - new diagnosis HTN 3 weeks prior to admission                                   - c/o HA, SOB, Orthopnea and increasing CP                                   - BP's 147-219/'s                                   - H&P indicates HA likely d/t HTN  TX: Increased dose of Lisinopril, initiation of Lopressor, IVP Hydralazine prn - given 2 doses at this time    Thank you,  Pati Kam RN, CCDS  Clinical   Connect via Pegg[employer]  Options provided:   -- Hypertensive Crisis   -- Hypertensive urgency   -- Uncontrolled Hypertension without Urgency or Crisis   -- Other, please specify ___________________________      Query created by: Pati Kam on 2022 9:05 AM    RESPONSE TEXT:    Hypertensive urgency          Electronically signed by:  ANGELIC OLMSTEAD MD 2022 10:24 AM

## 2022-07-27 NOTE — DISCHARGE SUMMARY
Discharge Summary    CHIEF COMPLAINT ON ADMISSION  Chief Complaint   Patient presents with   • Shortness of Breath     Constant, worse when lying down   • Hypertension   • Headache       Reason for Admission  other     Admission Date  7/26/2022    CODE STATUS  Full Code    HPI & HOSPITAL COURSE    Gage Garcia is a 46 y.o. male with a PMHx of hypertension, strep throat, who presented 7/26/2022 with shortness of breath and high blood pressure.     Patient states that he was at [facility] approximately 3 and half weeks ago due to sore throat and high fevers of 103F.  He was then diagnosed with high blood pressure at [facility] of which she was prescribed lisinopril.  Patient was also given amoxicillin, and steroids to help with his strep throat.  Patient finished the course of his antibiotic therapy and was staying compliant with his blood pressure medication.  Patient progressively had gotten worse with feeling weak, headaches, and shortness of breath.  He took a home COVID test of which this noted to be negative.  Patient also reports polyuria, polydipsia, and increased thirst.  He also indicates having bilateral lower extremity swelling which normally happens to him when he does not exercise regularly.  Patient endorses that his tongue feels dry and has been experiencing more heartburn than normal.  Patient denies any other medications besides the prescribed medication prescribed 3-1/2 weeks ago.     Patient endorses having 110 pounds weight gain.  He has a significant family history of congestive heart failure and diabetes in her maternal side.  Patient also smokes cigarettes daily approximately a pack per day to 1.5 days.  Patient also endorses smoking marijuana on a daily basis.  Patient currently works at the [employer] working [employer] shift.     Patient had gone home from his work at 1 AM due to not feeling well.  He waited for his roommate around 6 AM to be taken to the ER due to increased shortness of  breath.     In ER, notable lab findings noted hemoglobin A1c at 5.7, initial troponin T 27, proBNP 2164.  TSH 1.740.  Influenza A/B, RSV, COVID all negative.  CTA pulmonary was obtained which noted no acute or chronic pulmonary embolism, cardiomegaly with small pericardial effusion, enlarged thyroid gland, and hepatomegaly and hepatic steatosis.     Patient was admitted under observation for cardiac work-up.  Patient will be prescribed beta-blocker, increase in ACE inhibitor's, and will place on diuretics.  An echocardiogram will also be ordered.  Telemetry monitoring will also be placed.    Echocardiogram was obtained which noted left ventricular ejection fraction visually estimated at 30% and unable to estimate right ventricular systolic pressure due to inadequate tricuspid jet regurgitant jet.  Patient was weighed daily during this hospital stay which noted patient in net water weight loss of 30 pounds.    Patient discussed imaging results along with plan of care for heart failure management.  Patient will place on metoprolol, ACE inhibitor's, Lasix, and spironolactone.  Patient added atorvastatin.  Patient referred to establish with a PCP, follow-up with cardiology with heart failure program, and sleep study.  Patient counseled and educated in regards to smoking cessation of cigarettes and marijuana.  Patient also given education in regards to heart failure diet with low-sodium and low-fat.  All questions and concerns answered prior to being discharged.  Patient discharged home.      Therefore, he is discharged in good and stable condition to home with close outpatient follow-up.    The patient recovered much more quickly than anticipated on admission.    Discharge Date  07/27/22      FOLLOW UP ITEMS POST DISCHARGE  Please call [phone] to schedule PCP appointment for patient.    Required specialty appointments include:       Discharge Instructions per MARGARET Lindsey    -[place] with  a PCP and follow-up s/p hospitalization  -Follow-up with cardiology for management of hypertension along with heart failure  -Referral to sleep study sent  -Start taking heart failure bundle medication: Beta-blocker, ACE inhibitor, diuretics of Lasix and spironolactone  -Stop smoking cigarettes and marijuana  -Stick to cardiac diet, low-sodium and low-fat    DIET: Low-sodium diet limited to 2 g, low-fat    ACTIVITY: As tolerated    DIAGNOSIS: Shortness of breath, heart failure    Return to ER if symptoms persist, chest pain, palpitations, shortness of breath, numbness, tingling, weakness, and high fevers.      DISCHARGE DIAGNOSES  Principal Problem (Resolved):    Hypertensive urgency POA: Yes  Active Problems:    Hypertension POA: Unknown    Smoker POA: Unknown  Resolved Problems:    Leg swelling POA: Unknown    Shortness of breath POA: Unknown    Headache POA: Unknown      FOLLOW UP  No future appointments.  [facility]  [address]  [facility] [address]  Schedule an appointment as soon as possible for a visit in 1 week(s)      [facility]- 2ND   [address]  [facility] [address]  [phone]  Schedule an appointment as soon as possible for a visit in 1 week(s)      [facility] PULMONARY [facility] - 2ND  [address]  [facility] [address]  [phone]  Schedule an appointment as soon as possible for a visit in 1 week(s)        MEDICATIONS ON DISCHARGE     Medication List      START taking these medications      Instructions   atorvastatin 20 MG Tabs  Commonly known as: LIPITOR   Take 1 Tablet by mouth every evening for 30 days.  Dose: 20 mg     furosemide 20 MG Tabs  Commonly known as: LASIX   Take 1 Tablet by mouth 2 times a day for 30 days.  Dose: 20 mg     metoprolol tartrate 25 MG Tabs  Commonly known as: LOPRESSOR   Take 1 Tablet by mouth 2 times a day for 30 days.  Dose: 25 mg     spironolactone 25 MG Tabs  Commonly known as:  ALDACTONE   Take 1 Tablet by mouth every day for 30 days.  Dose: 25 mg        CHANGE how you take these medications      Instructions   lisinopril 20 MG Tabs  Start taking on: July 28, 2022  What changed:   · medication strength  · how much to take  Commonly known as: PRINIVIL   Take 1 Tablet by mouth every day for 30 days.  Dose: 20 mg        STOP taking these medications    amoxicillin 500 MG Caps  Commonly known as: AMOXIL     ibuprofen 800 MG Tabs  Commonly known as: MOTRIN            Allergies  No Known Allergies    DIET  Orders Placed This Encounter   Procedures   • Diet Order Diet: Cardiac     Standing Status:   Standing     Number of Occurrences:   1     Order Specific Question:   Diet:     Answer:   Cardiac [6]       ACTIVITY  As tolerated.  Weight bearing as tolerated    CONSULTATIONS  NONE    PROCEDURES  NONE    IMAGING  EC-ECHOCARDIOGRAM COMPLETE W/O CONT   Final Result      CT-CTA CHEST PULMONARY ARTERY W/ RECONS   Final Result      1. No acute or chronic pulmonary embolism.   2. Cardiomegaly with a small pericardial effusion.   3. Enlarged thyroid gland.   4. Hepatomegaly and hepatic steatosis.         DX-CHEST-PORTABLE (1 VIEW)   Final Result      No acute cardiac or pulmonary abnormalities are identified.            LABORATORY  Lab Results   Component Value Date    SODIUM 139 07/27/2022    POTASSIUM 3.7 07/27/2022    CHLORIDE 104 07/27/2022    CO2 25 07/27/2022    GLUCOSE 100 (H) 07/27/2022    BUN 32 (H) 07/27/2022    CREATININE 1.23 07/27/2022        Lab Results   Component Value Date    WBC 8.7 07/27/2022    HEMOGLOBIN 16.0 07/27/2022    HEMATOCRIT 46.3 07/27/2022    PLATELETCT 177 07/27/2022        Total time of the discharge process took 36 minutes.    ========================================================================================================  Please note that this dictation was created using voice recognition software. I have made every reasonable attempt to correct obvious errors,  but there may be errors of grammar and possibly content that I did not discover before finalizing the note.    Electronically signed by:  DEMARIO Connors, MSN, APRN, FNP-C  Hospitalist Services  [facility]  [phone]  [email or web address]  07/27/22                   [id number]

## 2022-07-27 NOTE — DISCHARGE INSTRUCTIONS
FOLLOW UP ITEMS POST DISCHARGE  Please call [phone] to schedule PCP appointment for patient.    Required specialty appointments include:       Discharge Instructions per MARGARET Lindsey    -Establish with a PCP and follow-up s/p hospitalization  -Follow-up with cardiology for management of hypertension along with heart failure  -Referral to sleep study sent  -Start taking heart failure bundle medication: Beta-blocker, ACE inhibitor, diuretics of Lasix and spironolactone  -Stop smoking cigarettes and marijuana  -Stick to cardiac diet, low-sodium and low-fat    DIET: Low-sodium diet limited to 2 g, low-fat    ACTIVITY: As tolerated    DIAGNOSIS: Shortness of breath, heart failure    Return to ER if symptoms persist, chest pain, palpitations, shortness of breath, numbness, tingling, weakness, and high fevers.    Discharge Instructions    Discharged to home by car with relative. Discharged via wheelchair, hospital escort: Yes.  Special equipment needed: Not Applicable    Be sure to schedule a follow-up appointment with your primary care doctor or any specialists as instructed.     Discharge Plan:   Diet Plan: Discussed  Activity Level: Discussed  Confirmed Follow up Appointment: Patient to Call and Schedule Appointment  Confirmed Symptoms Management: Discussed  Medication Reconciliation Updated: Yes    I understand that a diet low in cholesterol, fat, and sodium is recommended for good health. Unless I have been given specific instructions below for another diet, I accept this instruction as my diet prescription.   Other diet: low fat, low cholesterol, and low salt diet.    Special Instructions: None    -Is this patient being discharged with medication to prevent blood clots?  No    Is patient discharged on Warfarin / Coumadin?   No     Atorvastatin tablets  What is this medicine?  ATORVASTATIN (a TORE va sta tin) is known as a HMG-CoA reductase inhibitor or 'statin'. It lowers the level of  cholesterol and triglycerides in the blood. This drug may also reduce the risk of heart attack, stroke, or other health problems in patients with risk factors for heart disease. Diet and lifestyle changes are often used with this drug.  This medicine may be used for other purposes; ask your health care provider or pharmacist if you have questions.  COMMON BRAND NAME(S): Lipitor  What should I tell my health care provider before I take this medicine?  They need to know if you have any of these conditions:  diabetes  if you often drink alcohol  history of stroke  kidney disease  liver disease  muscle aches or weakness  thyroid disease  an unusual or allergic reaction to atorvastatin, other medicines, foods, dyes, or preservatives  pregnant or trying to get pregnant  breast-feeding  How should I use this medicine?  Take this medicine by mouth with a glass of water. Follow the directions on the prescription label. You can take it with or without food. If it upsets your stomach, take it with food. Do not take with grapefruit juice. Take your medicine at regular intervals. Do not take it more often than directed. Do not stop taking except on your doctor's advice.  Talk to your pediatrician regarding the use of this medicine in children. While this drug may be prescribed for children as young as 10 for selected conditions, precautions do apply.  Overdosage: If you think you have taken too much of this medicine contact a poison control center or emergency room at once.  NOTE: This medicine is only for you. Do not share this medicine with others.  What if I miss a dose?  If you miss a dose, take it as soon as you can. If your next dose is to be taken in less than 12 hours, then do not take the missed dose. Take the next dose at your regular time. Do not take double or extra doses.  What may interact with this medicine?  Do not take this medicine with any of the following medications:  dasabuvir; ombitasvir; paritaprevir;  ritonavir  ombitasvir; paritaprevir; ritonavir  posaconazole  red yeast rice  This medicine may also interact with the following medications:  alcohol  birth control pills  certain antibiotics like erythromycin and clarithromycin  certain antivirals for HIV or hepatitis  certain medicines for cholesterol like fenofibrate, gemfibrozil, and niacin  certain medicines for fungal infections like ketoconazole and itraconazole  colchicine  cyclosporine  digoxin  grapefruit juice  rifampin  This list may not describe all possible interactions. Give your health care provider a list of all the medicines, herbs, non-prescription drugs, or dietary supplements you use. Also tell them if you smoke, drink alcohol, or use illegal drugs. Some items may interact with your medicine.  What should I watch for while using this medicine?  Visit your doctor or health care professional for regular check-ups. You may need regular tests to make sure your liver is working properly.  Your health care professional may tell you to stop taking this medicine if you develop muscle problems. If your muscle problems do not go away after stopping this medicine, contact your health care professional.  Do not become pregnant while taking this medicine. Women should inform their health care professional if they wish to become pregnant or think they might be pregnant. There is a potential for serious side effects to an unborn child. Talk to your health care professional or pharmacist for more information. Do not breast-feed an infant while taking this medicine.  This medicine may increase blood sugar. Ask your healthcare provider if changes in diet or medicines are needed if you have diabetes.  If you are going to need surgery or other procedure, tell your doctor that you are using this medicine.  This drug is only part of a total heart-health program. Your doctor or a dietician can suggest a low-cholesterol and low-fat diet to help. Avoid alcohol and  smoking, and keep a proper exercise schedule.  This medicine may cause a decrease in Co-Enzyme Q-10. You should make sure that you get enough Co-Enzyme Q-10 while you are taking this medicine. Discuss the foods you eat and the vitamins you take with your health care professional.  What side effects may I notice from receiving this medicine?  Side effects that you should report to your doctor or health care professional as soon as possible:  allergic reactions like skin rash, itching or hives, swelling of the face, lips, or tongue  fever  joint pain  loss of memory  redness, blistering, peeling or loosening of the skin, including inside the mouth  signs and symptoms of high blood sugar such as being more thirsty or hungry or having to urinate more than normal. You may also feel very tired or have blurry vision.  signs and symptoms of liver injury like dark yellow or brown urine; general ill feeling or flu-like symptoms; light-belly pain; unusually weak or tired; yellowing of the eyes or skin  signs and symptoms of muscle injury like dark urine; trouble passing urine or change in the amount of urine; unusually weak or tired; muscle pain or side or back pain  Side effects that usually do not require medical attention (report to your doctor or health care professional if they continue or are bothersome):  diarrhea  nausea  stomach pain  trouble sleeping  upset stomach  This list may not describe all possible side effects. Call your doctor for medical advice about side effects. You may report side effects to FDA at 9-982-FDA-0210.  Where should I keep my medicine?  Keep out of the reach of children.  Store between 20 and 25 degrees C (68 and 77 degrees F). Throw away any unused medicine after the expiration date.  NOTE: This sheet is a summary. It may not cover all possible information. If you have questions about this medicine, talk to your doctor, pharmacist, or health care provider.  © 2020 [employer]  (2019-10-09 11:36:16)      Furosemide tablets  What is this medicine?  FUROSEMIDE ([place] se mide) is a diuretic. It helps you make more urine and to lose salt and excess water from your body. This medicine is used to treat high blood pressure, and edema or swelling from heart, kidney, or liver disease.  This medicine may be used for other purposes; ask your health care provider or pharmacist if you have questions.  COMMON BRAND NAME(S): Active-Medicated Specimen Kit, Delone, Diuscreen, Lasix, RX Specimen Collection Kit, Specimen Collection Kit, URINX Medicated Specimen Collection  What should I tell my health care provider before I take this medicine?  They need to know if you have any of these conditions:  abnormal blood electrolytes  diarrhea or vomiting  gout  heart disease  kidney disease, small amounts of urine, or difficulty passing urine  liver disease  thyroid disease  an unusual or allergic reaction to furosemide, sulfa drugs, other medicines, foods, dyes, or preservatives  pregnant or trying to get pregnant  breast-feeding  How should I use this medicine?  Take this medicine by mouth with a glass of water. Follow the directions on the prescription label. You may take this medicine with or without food. If it upsets your stomach, take it with food or milk. Do not take your medicine more often than directed. Remember that you will need to pass more urine after taking this medicine. Do not take your medicine at a time of day that will cause you problems. Do not take at bedtime.  Talk to your pediatrician regarding the use of this medicine in children. While this drug may be prescribed for selected conditions, precautions do apply.  Overdosage: If you think you have taken too much of this medicine contact a poison control center or emergency room at once.  NOTE: This medicine is only for you. Do not share this medicine with others.  What if I miss a dose?  If you miss a dose, take it as soon as you can. If  it is almost time for your next dose, take only that dose. Do not take double or extra doses.  What may interact with this medicine?  aspirin and aspirin-like medicines  certain antibiotics  chloral hydrate  cisplatin  cyclosporine  digoxin  diuretics  laxatives  lithium  medicines for blood pressure  medicines that relax muscles for surgery  methotrexate  NSAIDs, medicines for pain and inflammation like ibuprofen, naproxen, or indomethacin  phenytoin  steroid medicines like prednisone or cortisone  sucralfate  thyroid hormones  This list may not describe all possible interactions. Give your health care provider a list of all the medicines, herbs, non-prescription drugs, or dietary supplements you use. Also tell them if you smoke, drink alcohol, or use illegal drugs. Some items may interact with your medicine.  What should I watch for while using this medicine?  Visit your doctor or health care provider for regular checks on your progress. Check your blood pressure regularly. Ask your doctor or health care provider what your blood pressure should be, and when you should contact him or her. If you are a diabetic, check your blood sugar as directed.  This medicine may cause serious skin reactions. They can happen weeks to months after starting the medicine. Contact your health care provider right away if you notice fevers or flu-like symptoms with a rash. The rash may be red or purple and then turn into blisters or peeling of the skin. Or, you might notice a red rash with swelling of the face, lips or lymph nodes in your neck or under your arms.  You may need to be on a special diet while taking this medicine. Check with your doctor. Also, ask how many glasses of fluid you need to drink a day. You must not get dehydrated.  You may get drowsy or dizzy. Do not drive, use machinery, or do anything that needs mental alertness until you know how this drug affects you. Do not stand or sit up quickly, especially if you are  an older patient. This reduces the risk of dizzy or fainting spells. Alcohol can make you more drowsy and dizzy. Avoid alcoholic drinks.  This medicine can make you more sensitive to the sun. Keep out of the sun. If you cannot avoid being in the sun, wear protective clothing and use sunscreen. Do not use sun lamps or tanning beds/booths.  What side effects may I notice from receiving this medicine?  Side effects that you should report to your doctor or health care professional as soon as possible:  blood in urine or stools  dry mouth  fever or chills  hearing loss or ringing in the ears  irregular heartbeat  muscle pain or weakness, cramps  rash, fever, and swollen lymph nodes  redness, blistering, peeling or loosening of the skin, including inside the mouth  skin rash  stomach upset, pain, or nausea  tingling or numbness in the hands or feet  unusually weak or tired  vomiting or diarrhea  yellowing of the eyes or skin  Side effects that usually do not require medical attention (report to your doctor or health care professional if they continue or are bothersome):  headache  loss of appetite  unusual bleeding or bruising  This list may not describe all possible side effects. Call your doctor for medical advice about side effects. You may report side effects to FDA at 6-506-FDA-1620.  Where should I keep my medicine?  Keep out of the reach of children.  Store at room temperature between 15 and 30 degrees C (59 and 86 degrees F). Protect from light. Throw away any unused medicine after the expiration date.  NOTE: This sheet is a summary. It may not cover all possible information. If you have questions about this medicine, talk to your doctor, pharmacist, or health care provider.  © 2020 [employer] Standard (2020-03-20 14:04:13)  Metoprolol tablets  What is this medicine?  METOPROLOL (me TOE proe lole) is a beta-blocker. Beta-blockers reduce the workload on the heart and help it to beat more regularly. This medicine  is used to treat high blood pressure and to prevent chest pain. It is also used to after a heart attack and to prevent an additional heart attack from occurring.  This medicine may be used for other purposes; ask your health care provider or pharmacist if you have questions.  COMMON BRAND NAME(S): Lopressor  What should I tell my health care provider before I take this medicine?  They need to know if you have any of these conditions:  diabetes  heart or vessel disease like slow heart rate, worsening heart failure, heart block, sick sinus syndrome or Raynaud's disease  kidney disease  liver disease  lung or breathing disease, like asthma or emphysema  pheochromocytoma  thyroid disease  an unusual or allergic reaction to metoprolol, other beta-blockers, medicines, foods, dyes, or preservatives  pregnant or trying to get pregnant  breast-feeding  How should I use this medicine?  Take this medicine by mouth with a drink of water. Follow the directions on the prescription label. Take this medicine immediately after meals. Take your doses at regular intervals. Do not take more medicine than directed. Do not stop taking this medicine suddenly. This could lead to serious heart-related effects.  Talk to your pediatrician regarding the use of this medicine in children. Special care may be needed.  Overdosage: If you think you have taken too much of this medicine contact a poison control center or emergency room at once.  NOTE: This medicine is only for you. Do not share this medicine with others.  What if I miss a dose?  If you miss a dose, take it as soon as you can. If it is almost time for your next dose, take only that dose. Do not take double or extra doses.  What may interact with this medicine?  This medicine may interact with the following medications:  certain medicines for blood pressure, heart disease, irregular heart beat  certain medicines for depression like monoamine oxidase (MAO) inhibitors, fluoxetine, or  paroxetine  clonidine  dobutamine  epinephrine  isoproterenol  reserpine  This list may not describe all possible interactions. Give your health care provider a list of all the medicines, herbs, non-prescription drugs, or dietary supplements you use. Also tell them if you smoke, drink alcohol, or use illegal drugs. Some items may interact with your medicine.  What should I watch for while using this medicine?  Visit your doctor or health care professional for regular check ups. Contact your doctor right away if your symptoms worsen. Check your blood pressure and pulse rate regularly. Ask your health care professional what your blood pressure and pulse rate should be, and when you should contact them.  You may get drowsy or dizzy. Do not drive, use machinery, or do anything that needs mental alertness until you know how this medicine affects you. Do not sit or stand up quickly, especially if you are an older patient. This reduces the risk of dizzy or fainting spells. Contact your doctor if these symptoms continue. Alcohol may interfere with the effect of this medicine. Avoid alcoholic drinks.  This medicine may increase blood sugar. Ask your healthcare provider if changes in diet or medicines are needed if you have diabetes.  What side effects may I notice from receiving this medicine?  Side effects that you should report to your doctor or health care professional as soon as possible:  allergic reactions like skin rash, itching or hives  cold or numb hands or feet  depression  difficulty breathing  faint  fever with sore throat  irregular heartbeat, chest pain  rapid weight gain    signs and symptoms of high blood sugar such as being more thirsty or hungry or having to urinate more than normal. You may also feel very tired or have blurry vision.  swollen legs or ankles  Side effects that usually do not require medical attention (report to your doctor or health care professional if they continue or are  bothersome):  anxiety or nervousness  change in sex drive or performance  dry skin  headache  nightmares or trouble sleeping  short term memory loss  stomach upset or diarrhea  This list may not describe all possible side effects. Call your doctor for medical advice about side effects. You may report side effects to FDA at 0-481-QOE-9275.  Where should I keep my medicine?  Keep out of the reach of children.  Store at room temperature between 15 and 30 degrees C (59 and 86 degrees F). Throw away any unused medicine after the expiration date.  NOTE: This sheet is a summary. It may not cover all possible information. If you have questions about this medicine, talk to your doctor, pharmacist, or health care provider.  © 2020 [employer] (2019-10-08 11:15:23)    Spironolactone tablets  What is this medicine?  SPIRONOLACTONE (trinh on oh LAK tone) is a diuretic. It helps you make more urine and to lose excess water from your body. This medicine is used to treat high blood pressure, and edema or swelling from heart, kidney, or liver disease. It is also used to treat patients who make too much aldosterone or have low potassium.  This medicine may be used for other purposes; ask your health care provider or pharmacist if you have questions.  COMMON BRAND NAME(S): Aldactone  What should I tell my health care provider before I take this medicine?  They need to know if you have any of these conditions:  high blood level of potassium  kidney disease or trouble making urine  liver disease  an unusual or allergic reaction to spironolactone, other medicines, foods, dyes, or preservatives  pregnant or trying to get pregnant  breast-feeding  How should I use this medicine?  Take this medicine by mouth with a drink of water. Follow the directions on your prescription label. You can take it with or without food. If it upsets your stomach, take it with food. Do not take your medicine more often than directed. Remember that  you will need to pass more urine after taking this medicine. Do not take your doses at a time of day that will cause you problems. Do not take at bedtime.  Talk to your pediatrician regarding the use of this medicine in children. While this drug may be prescribed for selected conditions, precautions do apply.  Overdosage: If you think you have taken too much of this medicine contact a poison control center or emergency room at once.  NOTE: This medicine is only for you. Do not share this medicine with others.  What if I miss a dose?  If you miss a dose, take it as soon as you can. If it is almost time for your next dose, take only that dose. Do not take double or extra doses.  What may interact with this medicine?  Do not take this medicine with any of the following medications:  cidofovir  eplerenone  tranylcypromine  This medicine may also interact with the following medications:  aspirin  certain medicines for blood pressure or heart disease like benazepril, lisinopril, losartan, valsartan  certain medicines that treat or prevent blood clots like heparin and enoxaparin  cholestyramine  cyclosporine  digoxin  lithium  medicines that relax muscles for surgery  NSAIDs, medicines for pain and inflammation, like ibuprofen or naproxen  other diuretics  potassium supplements  steroid medicines like prednisone or cortisone  trimethoprim  This list may not describe all possible interactions. Give your health care provider a list of all the medicines, herbs, non-prescription drugs, or dietary supplements you use. Also tell them if you smoke, drink alcohol, or use illegal drugs. Some items may interact with your medicine.  What should I watch for while using this medicine?  Visit your doctor or health care professional for regular checks on your progress. Check your blood pressure as directed. Ask your doctor what your blood pressure should be, and when you should contact them.  You may need to be on a special diet while  taking this medicine. Ask your doctor. Also, ask how many glasses of fluid you need to drink a day. You must not get dehydrated.  This medicine may make you feel confused, dizzy or lightheaded. Drinking alcohol and taking some medicines can make this worse. Do not drive, use machinery, or do anything that needs mental alertness until you know how this medicine affects you. Do not sit or stand up quickly.  What side effects may I notice from receiving this medicine?  Side effects that you should report to your doctor or health care professional as soon as possible:  allergic reactions such as skin rash or itching, hives, swelling of the lips, mouth, tongue, or throat  black or tarry stools  fast, irregular heartbeat  fever  muscle pain, cramps  numbness, tingling in hands or feet  trouble breathing  trouble passing urine  unusual bleeding  unusually weak or tired  Side effects that usually do not require medical attention (report to your doctor or health care professional if they continue or are bothersome):  change in voice or hair growth  confusion  dizzy, drowsy  dry mouth, increased thirst  enlarged or tender breasts  headache  irregular menstrual periods  sexual difficulty, unable to have an erection  stomach upset  This list may not describe all possible side effects. Call your doctor for medical advice about side effects. You may report side effects to FDA at 5-688-OBH-9019.  Where should I keep my medicine?  Keep out of the reach of children.  Store below 25 degrees C (77 degrees F). Throw away any unused medicine after the expiration date.  NOTE: This sheet is a summary. It may not cover all possible information. If you have questions about this medicine, talk to your doctor, pharmacist, or health care provider.  © 2020 [employer] (2018-05-04 09:42:28)    How to Take Your Blood Pressure  You can take your blood pressure at home with a machine. You may need to check your blood pressure at  "home:  To check if you have high blood pressure (hypertension).  To check your blood pressure over time.  To make sure your blood pressure medicine is working.  Supplies needed:  You will need a blood pressure machine, or monitor. You can buy one at a [employer] or online. When choosing one:  Choose one with an arm cuff.  Choose one that wraps around your upper arm. Only one finger should fit between your arm and the cuff.  Do not choose one that measures your blood pressure from your wrist or finger.  Your doctor can suggest a monitor.  How to prepare  Avoid these things for 30 minutes before checking your blood pressure:  Drinking caffeine.  Drinking alcohol.  Eating.  Smoking.  Exercising.  Five minutes before checking your blood pressure:  Pee.  Sit in a dining chair. Avoid sitting in a soft couch or armchair.  Be quiet. Do not talk.  How to take your blood pressure  Follow the instructions that came with your machine. If you have a digital blood pressure monitor, these may be the instructions:  Sit up straight.  Place your feet on the floor. Do not cross your ankles or legs.  Rest your left arm at the level of your heart. You may rest it on a table, desk, or chair.  Pull up your shirt sleeve.  Wrap the blood pressure cuff around the upper part of your left arm. The cuff should be 1 inch (2.5 cm) above your elbow. It is best to wrap the cuff around bare skin.  Fit the cuff snugly around your arm. You should be able to place only one finger between the cuff and your arm.  Put the cord inside the groove of your elbow.  Press the power button.  Sit quietly while the cuff fills with air and loses air.  Write down the numbers on the screen.  Wait 2-3 minutes and then repeat steps 1-10.  What do the numbers mean?  Two numbers make up your blood pressure. The first number is called systolic pressure. The second is called diastolic pressure. An example of a blood pressure reading is \"120 over 80\" (or 120/80).  If you are " an adult and do not have a medical condition, use this guide to find out if your blood pressure is normal:  Normal  First number: below 120.  Second number: below 80.  Elevated  First number: 120-129.  Second number: below 80.  Hypertension stage 1  First number: 130-139.  Second number: 80-89.  Hypertension stage 2  First number: 140 or above.  Second number: 90 or above.  Your blood pressure is above normal even if only the top or bottom number is above normal.  Follow these instructions at home:  Check your blood pressure as often as your doctor tells you to.  Take your monitor to your next doctor's appointment. Your doctor will:  Make sure you are using it correctly.  Make sure it is working right.  Make sure you understand what your blood pressure numbers should be.  Tell your doctor if your medicines are causing side effects.  Contact a doctor if:  Your blood pressure keeps being high.  Get help right away if:  Your first blood pressure number is higher than 180.  Your second blood pressure number is higher than 120.  This information is not intended to replace advice given to you by your health care provider. Make sure you discuss any questions you have with your health care provider.  Document Released: 11/30/2009 Document Revised: 11/30/2018 Document Reviewed: 05/26/2017  Elsevier Patient Education © 2020 [employer]

## 2022-07-27 NOTE — PROGRESS NOTES
Discharge instructions reviewed but patient needing to leave prior to completion so that he would not miss bus. Pt to review at home and this RN gave work number to contact if any questions. Pt to f/u outpatient and will  meds at [facility].

## 2022-07-27 NOTE — CARE PLAN
The patient is Stable - Low risk of patient condition declining or worsening    Shift Goals  Clinical Goals: Diurese  Patient Goals: Rest  Family Goals: N/A Family Not Present    Progress made toward(s) clinical / shift goals:      Problem: Knowledge Deficit - Standard  Goal: Patient and family/care givers will demonstrate understanding of plan of care, disease process/condition, diagnostic tests and medications  Outcome: Progressing

## 2022-07-27 NOTE — PROGRESS NOTES
Assumed care from Lynda CURRIE  Assessment complete. Plan of care gone over with patient and all concerns were gone over. Patient was resting in bed comfortably. Patient was A&O x 4. Safety precautions in place. Patient had no concerns at this time and educated on how to use the call light. Will continue to do hourly monitoring.

## 2022-07-28 NOTE — DISCHARGE PLANNING
Received call from [facility] with [facility] pharmacy, approved service price for all meds prescribed on d/c is $48.06, these prescriptions will be processed tomorrow morning. Approved services form faxed to pharmacy. Hand off to ER RN CM working tomorrow to f/u with pharmacy to see when meds filled and to call patient.

## 2022-07-28 NOTE — DISCHARGE PLANNING
"Received call from Dayne in d/c yosef advising that patient is at the [facility] pharmacy where his meds were sent and the cost to fill them is $70 , he is uninsured and does not have the money to pay for the prescriptions. Dayne said the patient said he was \"told his meds would be free\".      I asked Dayne to contact nurse practitioner Ade and ask that she send the prescriptions to [facility] as they are the only ones who will accept our approved service.  She will contact Ade.    Called [facility] pharmacy, even if the scripts are sent over before 1800, and an approved service faxed they won't be processed until tomorrow as the pharmacy closes at 1800.    1750 Reviewed chart, meds still haven't been sent to [facility]. Called LUIS Oscar for d/c [facility], she said Ade agreed to send the meds to [facility]. Asked Dayne to call patient and let him know that ER CM f/u with him tomorrow morning after meds have been processed, he will need to come to [facility] to pick them up. Hand off given to ER RN CELY who is working tomorrow to f/u on the meds tomorrow.  "

## 2022-07-28 NOTE — PROGRESS NOTES
Patient called to notify this RN that medications $70 at [facility] and patient does not have money. This RN spoke with Madan TA and approved services through [facility] will cover medications. Ade WHITESIDE notified and will send meds to [facility]. Pharmacy will close at 6p and pt can get meds in am. Patient updated and notified that CELY or this RN will reach out to patient in AM.

## 2022-08-24 ENCOUNTER — HOSPITAL ENCOUNTER (EMERGENCY)
Facility: MEDICAL CENTER | Age: 47
End: 2022-08-24
Attending: EMERGENCY MEDICINE
Payer: COMMERCIAL

## 2022-08-24 ENCOUNTER — APPOINTMENT (OUTPATIENT)
Dept: RADIOLOGY | Facility: MEDICAL CENTER | Age: 47
End: 2022-08-24
Attending: EMERGENCY MEDICINE

## 2022-08-24 VITALS
HEART RATE: 76 BPM | SYSTOLIC BLOOD PRESSURE: 176 MMHG | DIASTOLIC BLOOD PRESSURE: 99 MMHG | RESPIRATION RATE: 18 BRPM | WEIGHT: 315 LBS | HEIGHT: 78 IN | OXYGEN SATURATION: 91 % | BODY MASS INDEX: 36.45 KG/M2 | TEMPERATURE: 98 F

## 2022-08-24 DIAGNOSIS — G44.209 ACUTE NON INTRACTABLE TENSION-TYPE HEADACHE: ICD-10-CM

## 2022-08-24 DIAGNOSIS — I10 PRIMARY HYPERTENSION: ICD-10-CM

## 2022-08-24 LAB
ALBUMIN SERPL BCP-MCNC: 4.5 G/DL (ref 3.2–4.9)
ALBUMIN/GLOB SERPL: 1.5 G/DL
ALP SERPL-CCNC: 51 U/L (ref 30–99)
ALT SERPL-CCNC: 23 U/L (ref 2–50)
ANION GAP SERPL CALC-SCNC: 13 MMOL/L (ref 7–16)
AST SERPL-CCNC: 21 U/L (ref 12–45)
BASOPHILS # BLD AUTO: 0.7 % (ref 0–1.8)
BASOPHILS # BLD: 0.05 K/UL (ref 0–0.12)
BILIRUB SERPL-MCNC: 0.9 MG/DL (ref 0.1–1.5)
BUN SERPL-MCNC: 21 MG/DL (ref 8–22)
CALCIUM SERPL-MCNC: 9.5 MG/DL (ref 8.5–10.5)
CHLORIDE SERPL-SCNC: 105 MMOL/L (ref 96–112)
CO2 SERPL-SCNC: 22 MMOL/L (ref 20–33)
CREAT SERPL-MCNC: 1.23 MG/DL (ref 0.5–1.4)
EKG IMPRESSION: NORMAL
EOSINOPHIL # BLD AUTO: 0.44 K/UL (ref 0–0.51)
EOSINOPHIL NFR BLD: 6.4 % (ref 0–6.9)
ERYTHROCYTE [DISTWIDTH] IN BLOOD BY AUTOMATED COUNT: 42.2 FL (ref 35.9–50)
GFR SERPLBLD CREATININE-BSD FMLA CKD-EPI: 73 ML/MIN/1.73 M 2
GLOBULIN SER CALC-MCNC: 3 G/DL (ref 1.9–3.5)
GLUCOSE SERPL-MCNC: 115 MG/DL (ref 65–99)
HCT VFR BLD AUTO: 48.9 % (ref 42–52)
HGB BLD-MCNC: 16.5 G/DL (ref 14–18)
IMM GRANULOCYTES # BLD AUTO: 0.02 K/UL (ref 0–0.11)
IMM GRANULOCYTES NFR BLD AUTO: 0.3 % (ref 0–0.9)
LYMPHOCYTES # BLD AUTO: 1.78 K/UL (ref 1–4.8)
LYMPHOCYTES NFR BLD: 25.8 % (ref 22–41)
MCH RBC QN AUTO: 27.5 PG (ref 27–33)
MCHC RBC AUTO-ENTMCNC: 33.7 G/DL (ref 33.7–35.3)
MCV RBC AUTO: 81.4 FL (ref 81.4–97.8)
MONOCYTES # BLD AUTO: 0.53 K/UL (ref 0–0.85)
MONOCYTES NFR BLD AUTO: 7.7 % (ref 0–13.4)
NEUTROPHILS # BLD AUTO: 4.09 K/UL (ref 1.82–7.42)
NEUTROPHILS NFR BLD: 59.1 % (ref 44–72)
NRBC # BLD AUTO: 0 K/UL
NRBC BLD-RTO: 0 /100 WBC
PLATELET # BLD AUTO: 181 K/UL (ref 164–446)
PMV BLD AUTO: 9.9 FL (ref 9–12.9)
POTASSIUM SERPL-SCNC: 3.9 MMOL/L (ref 3.6–5.5)
PROT SERPL-MCNC: 7.5 G/DL (ref 6–8.2)
RBC # BLD AUTO: 6.01 M/UL (ref 4.7–6.1)
SODIUM SERPL-SCNC: 140 MMOL/L (ref 135–145)
TROPONIN T SERPL-MCNC: 13 NG/L (ref 6–19)
WBC # BLD AUTO: 6.9 K/UL (ref 4.8–10.8)

## 2022-08-24 PROCEDURE — 99283 EMERGENCY DEPT VISIT LOW MDM: CPT

## 2022-08-24 PROCEDURE — 85025 COMPLETE CBC W/AUTO DIFF WBC: CPT

## 2022-08-24 PROCEDURE — 93005 ELECTROCARDIOGRAM TRACING: CPT | Performed by: EMERGENCY MEDICINE

## 2022-08-24 PROCEDURE — 93005 ELECTROCARDIOGRAM TRACING: CPT

## 2022-08-24 PROCEDURE — 84484 ASSAY OF TROPONIN QUANT: CPT

## 2022-08-24 PROCEDURE — 71045 X-RAY EXAM CHEST 1 VIEW: CPT

## 2022-08-24 PROCEDURE — 80053 COMPREHEN METABOLIC PANEL: CPT

## 2022-08-24 PROCEDURE — 36415 COLL VENOUS BLD VENIPUNCTURE: CPT

## 2022-08-24 RX ORDER — FUROSEMIDE 40 MG/1
20 TABLET ORAL ONCE
Status: DISCONTINUED | OUTPATIENT
Start: 2022-08-24 | End: 2022-08-24 | Stop reason: HOSPADM

## 2022-08-24 ASSESSMENT — FIBROSIS 4 INDEX: FIB4 SCORE: 1.18

## 2022-08-24 NOTE — ED TRIAGE NOTES
Chief Complaint   Patient presents with    Dizziness     Woke up this am feeling dizzy describes as going to pass out. Has hx of CHF w/30% EF    Blood Pressure Problem     Checked BP this am when went to [id number]    Headache     Since yesterday     Denies chest pain/sob/vision changes. Educated on triage process. Instructed to notify staff for any worsening symptoms. Denies any recent travel. Denies exposure to known covid positive patients. Denies any respiratory symptoms.

## 2022-08-25 NOTE — ED NOTES
DC instructions reviewed with pt. Pt verbalized understanding. Pt ambulated to [facility] with steady gait.

## 2022-09-02 ENCOUNTER — OFFICE VISIT (OUTPATIENT)
Dept: MEDICAL GROUP | Facility: PHYSICIAN GROUP | Age: 47
End: 2022-09-02

## 2022-09-02 VITALS
OXYGEN SATURATION: 97 % | RESPIRATION RATE: 12 BRPM | HEIGHT: 78 IN | HEART RATE: 87 BPM | DIASTOLIC BLOOD PRESSURE: 118 MMHG | SYSTOLIC BLOOD PRESSURE: 164 MMHG | TEMPERATURE: 98.5 F | WEIGHT: 315 LBS | BODY MASS INDEX: 36.45 KG/M2

## 2022-09-02 DIAGNOSIS — I50.22 CHRONIC SYSTOLIC HEART FAILURE (HCC): ICD-10-CM

## 2022-09-02 DIAGNOSIS — I10 PRIMARY HYPERTENSION: ICD-10-CM

## 2022-09-02 DIAGNOSIS — Z76.89 ENCOUNTER TO ESTABLISH CARE: ICD-10-CM

## 2022-09-02 DIAGNOSIS — E66.01 MORBID OBESITY (HCC): ICD-10-CM

## 2022-09-02 PROCEDURE — 99203 OFFICE O/P NEW LOW 30 MIN: CPT | Performed by: FAMILY MEDICINE

## 2022-09-02 RX ORDER — ATORVASTATIN CALCIUM 10 MG/1
10 TABLET, FILM COATED ORAL NIGHTLY
COMMUNITY
End: 2022-09-02 | Stop reason: SDUPTHER

## 2022-09-02 RX ORDER — SPIRONOLACTONE 25 MG/1
25 TABLET ORAL DAILY
Qty: 30 TABLET | Refills: 11 | Status: ON HOLD | OUTPATIENT
Start: 2022-09-02 | End: 2023-02-09

## 2022-09-02 RX ORDER — METOPROLOL SUCCINATE 25 MG/1
25 TABLET, EXTENDED RELEASE ORAL DAILY
Qty: 30 TABLET | Refills: 11 | Status: SHIPPED | OUTPATIENT
Start: 2022-09-02 | End: 2022-09-18 | Stop reason: SDUPTHER

## 2022-09-02 RX ORDER — SPIRONOLACTONE 25 MG/1
25 TABLET ORAL DAILY
COMMUNITY
End: 2022-09-02 | Stop reason: SDUPTHER

## 2022-09-02 RX ORDER — ASPIRIN 81 MG/1
81 TABLET, CHEWABLE ORAL DAILY
Status: ON HOLD | COMMUNITY
End: 2023-02-04

## 2022-09-02 RX ORDER — FUROSEMIDE 20 MG/1
20 TABLET ORAL 2 TIMES DAILY
Qty: 60 TABLET | Refills: 11 | Status: SHIPPED | OUTPATIENT
Start: 2022-09-02 | End: 2023-02-13 | Stop reason: SDUPTHER

## 2022-09-02 RX ORDER — FUROSEMIDE 20 MG/1
20 TABLET ORAL 2 TIMES DAILY
COMMUNITY
End: 2022-09-02 | Stop reason: SDUPTHER

## 2022-09-02 RX ORDER — METOPROLOL SUCCINATE 25 MG/1
25 TABLET, EXTENDED RELEASE ORAL DAILY
COMMUNITY
End: 2022-09-02 | Stop reason: SDUPTHER

## 2022-09-02 RX ORDER — ATORVASTATIN CALCIUM 10 MG/1
10 TABLET, FILM COATED ORAL NIGHTLY
Qty: 30 TABLET | Refills: 11 | Status: SHIPPED | OUTPATIENT
Start: 2022-09-02 | End: 2022-09-18 | Stop reason: SDUPTHER

## 2022-09-02 RX ORDER — LISINOPRIL 20 MG/1
20 TABLET ORAL DAILY
COMMUNITY
End: 2022-09-02 | Stop reason: SDUPTHER

## 2022-09-02 RX ORDER — LISINOPRIL 20 MG/1
20 TABLET ORAL DAILY
Qty: 30 TABLET | Refills: 11 | Status: ON HOLD | OUTPATIENT
Start: 2022-09-02 | End: 2023-02-04

## 2022-09-02 ASSESSMENT — FIBROSIS 4 INDEX: FIB4 SCORE: 1.11

## 2022-09-02 ASSESSMENT — PATIENT HEALTH QUESTIONNAIRE - PHQ9
5. POOR APPETITE OR OVEREATING: 1 - SEVERAL DAYS
CLINICAL INTERPRETATION OF PHQ2 SCORE: 2
SUM OF ALL RESPONSES TO PHQ QUESTIONS 1-9: 5

## 2022-09-02 NOTE — ASSESSMENT & PLAN NOTE
This is a new problem of.  Was found on hospitalization recently where his echocardiogram showed an ejection fraction of 30%.  He states that he had a lot of fluid overload and after he was diuresed he felt much better.

## 2022-09-02 NOTE — ASSESSMENT & PLAN NOTE
This is a chronic problem.  Patient ran out of his blood pressure medications and is asking for refill.  It is unknown if he was under good control while he was on them .

## 2022-09-02 NOTE — ASSESSMENT & PLAN NOTE
Patient is here today to establish care.  He was recently hospitalized for hypertension and congestive heart failure.  He has difficulty affording healthy foods and even his medications as [facility]  He currently does not have insurance but is hoping it will kick in soon with his current job.  In fact he ran out of his medications and needs refills on those as well.  His blood pressure is elevated again as a result.

## 2022-09-02 NOTE — PROGRESS NOTES
Subjective:     CC: Here to establish care and to get medication refills.    HPI:   Gage presents today with the following medical concerns:    Encounter to establish care  Patient is here today to establish care.  He was recently hospitalized for hypertension and congestive heart failure.  He has difficulty affording healthy foods and even his medications as he lives paycheck to [facility].  He currently does not have insurance but is hoping it will kick in soon with his current job.  In fact he ran out of his medications and needs refills on those as well.  His blood pressure is elevated again as a result.    Chronic systolic heart failure (HCC)  This is a new problem of.  Was found on hospitalization recently where his echocardiogram showed an ejection fraction of 30%.  He states that he had a lot of fluid overload and after he was diuresed he felt much better.    Hypertension  This is a chronic problem.  Patient ran out of his blood pressure medications and is asking for refill.  It is unknown if he was under good control while he was on them .    Morbid obesity (HCC)  This is a chronic problem.  Patient does try to monitor his weight.    Past Medical History:   Diagnosis Date    Hypertension     Obesity     Smoker     Strep pharyngitis        Social History     Tobacco Use    Smoking status: Every Day     Packs/day: 0.50     Types: Cigarettes    Smokeless tobacco: Never   Vaping Use    Vaping Use: Never used   Substance Use Topics    Alcohol use: Not Currently     Comment: occ    Drug use: Yes     Types: Inhaled, Marijuana     Comment: thc       Current Outpatient Medications Ordered in Epic   Medication Sig Dispense Refill    aspirin (ASA) 81 MG Chew Tab chewable tablet Chew 81 mg every day.      atorvastatin (LIPITOR) 10 MG Tab Take 1 Tablet by mouth every evening. 30 Tablet 11    furosemide (LASIX) 20 MG Tab Take 1 Tablet by mouth 2 times a day. 60 Tablet 11    lisinopril (PRINIVIL) 20 MG Tab Take 1 Tablet  "by mouth every day. 30 Tablet 11    metoprolol SR (TOPROL XL) 25 MG TABLET SR 24 HR Take 1 Tablet by mouth every day. 30 Tablet 11    spironolactone (ALDACTONE) 25 MG Tab Take 1 Tablet by mouth every day. 30 Tablet 11     No current [facility]-ordered facility-administered medications on file.       Allergies:  Patient has no known allergies.    Health Maintenance: Completed    ROS:  Gen: no fevers/chills, no changes in weight  Eyes: no changes in vision  ENT: no sore throat, no hearing loss, no bloody nose  Pulm: no sob, no cough  CV: no chest pain, no palpitations  GI: no nausea/vomiting, no diarrhea  : no dysuria  MSk: no myalgias  Skin: no rash  Neuro: no headaches, no numbness/tingling  Heme/Lymph: no easy bruising      Objective:       Exam:  BP (!) 164/118 (BP Location: Right arm, Patient Position: Sitting, BP Cuff Size: Large adult)   Pulse 87   Temp 36.9 °C (98.5 °F)   Resp 12   Ht 2.083 m (6' 10\")   Wt (!) 156 kg (344 lb 8 oz)   SpO2 97%   BMI 36.02 kg/m²  Body mass index is 36.02 kg/m².    Gen: Alert and oriented, No apparent distress.  Neck: Neck is supple without lymphadenopathy.  Lungs: Normal effort, CTA bilaterally, no wheezes, rhonchi, or rales  CV: Regular rate and rhythm. No murmurs, rubs, or gallops.  Ext: No clubbing, cyanosis, edema.        Labs: Reviewed    Assessment & Plan:     46 y.o. male with the following -     1. Morbid obesity (HCC)  This is a chronic problem.  Continue to monitor.  Encourage weight reduction.  - Patient identified as having weight management issue.  Appropriate orders and counseling given.    2. Chronic systolic heart failure (HCC)  This is likely a chronic problem.  Referral to cardiology will be made.  Medications will be renewed.  - REFERRAL TO CARE MANAGEMENT    3. Encounter to establish care  Patient establish care with me today.  We will renew all his medications.  We will see if  can offer any assistance to him in regards to medical care, food " or medications.    4. Primary hypertension  This is a chronic problem.  Medications renewed.  Patient was told to come back in for blood pressure check in 1 to 2 weeks.  Will make further adjustments as needed.  Referral to cardiology made as well.  - REFERRAL TO CARE MANAGEMENT      Return in about 1 week (around 9/9/2022) for bp check with MA.    Please note that this dictation was created using voice recognition software. I have made every reasonable attempt to correct obvious errors, but I expect that there are errors of grammar and possibly content that I did not discover before finalizing the note.

## 2022-09-09 ENCOUNTER — PATIENT OUTREACH (OUTPATIENT)
Dept: HEALTH INFORMATION MANAGEMENT | Facility: OTHER | Age: 47
End: 2022-09-09

## 2022-09-09 NOTE — PROGRESS NOTES
9/9/22   MARLEN Vasquez called patient to provide community resources and could not leave a voicemail. CHW will try to contact patient sometime next week.      9/16/22   MARLEN Vasquez called patient to provide food pantry resources and check in, CHW was not able to leave a voicemail. CHW will try again next week.     9/21/22  MARLEN Vasquez called patient to provide community resources and information on food pantry, but CHW was not able to leave a voicemail. CHW will not continue to follow, as this was the third attempt of outreach.

## 2022-09-18 ENCOUNTER — APPOINTMENT (OUTPATIENT)
Dept: RADIOLOGY | Facility: MEDICAL CENTER | Age: 47
End: 2022-09-18
Attending: EMERGENCY MEDICINE

## 2022-09-18 ENCOUNTER — HOSPITAL ENCOUNTER (EMERGENCY)
Facility: MEDICAL CENTER | Age: 47
End: 2022-09-19
Attending: EMERGENCY MEDICINE
Payer: COMMERCIAL

## 2022-09-18 DIAGNOSIS — I50.9 ACUTE ON CHRONIC CONGESTIVE HEART FAILURE, UNSPECIFIED HEART FAILURE TYPE (HCC): ICD-10-CM

## 2022-09-18 DIAGNOSIS — I10 HYPERTENSION, UNSPECIFIED TYPE: ICD-10-CM

## 2022-09-18 DIAGNOSIS — Z76.0 MEDICATION REFILL: ICD-10-CM

## 2022-09-18 LAB
ALBUMIN SERPL BCP-MCNC: 4.2 G/DL (ref 3.2–4.9)
ALBUMIN/GLOB SERPL: 1.4 G/DL
ALP SERPL-CCNC: 57 U/L (ref 30–99)
ALT SERPL-CCNC: 18 U/L (ref 2–50)
ANION GAP SERPL CALC-SCNC: 9 MMOL/L (ref 7–16)
AST SERPL-CCNC: 20 U/L (ref 12–45)
BASOPHILS # BLD AUTO: 0.6 % (ref 0–1.8)
BASOPHILS # BLD: 0.05 K/UL (ref 0–0.12)
BILIRUB SERPL-MCNC: 0.8 MG/DL (ref 0.1–1.5)
BUN SERPL-MCNC: 11 MG/DL (ref 8–22)
CALCIUM SERPL-MCNC: 9.2 MG/DL (ref 8.5–10.5)
CHLORIDE SERPL-SCNC: 103 MMOL/L (ref 96–112)
CO2 SERPL-SCNC: 29 MMOL/L (ref 20–33)
CREAT SERPL-MCNC: 1.12 MG/DL (ref 0.5–1.4)
EKG IMPRESSION: NORMAL
EOSINOPHIL # BLD AUTO: 0.42 K/UL (ref 0–0.51)
EOSINOPHIL NFR BLD: 5.2 % (ref 0–6.9)
ERYTHROCYTE [DISTWIDTH] IN BLOOD BY AUTOMATED COUNT: 42.3 FL (ref 35.9–50)
GFR SERPLBLD CREATININE-BSD FMLA CKD-EPI: 82 ML/MIN/1.73 M 2
GLOBULIN SER CALC-MCNC: 3.1 G/DL (ref 1.9–3.5)
GLUCOSE SERPL-MCNC: 104 MG/DL (ref 65–99)
HCT VFR BLD AUTO: 48.2 % (ref 42–52)
HGB BLD-MCNC: 16.9 G/DL (ref 14–18)
IMM GRANULOCYTES # BLD AUTO: 0.03 K/UL (ref 0–0.11)
IMM GRANULOCYTES NFR BLD AUTO: 0.4 % (ref 0–0.9)
LYMPHOCYTES # BLD AUTO: 1.8 K/UL (ref 1–4.8)
LYMPHOCYTES NFR BLD: 22.3 % (ref 22–41)
MCH RBC QN AUTO: 28.3 PG (ref 27–33)
MCHC RBC AUTO-ENTMCNC: 35.1 G/DL (ref 33.7–35.3)
MCV RBC AUTO: 80.7 FL (ref 81.4–97.8)
MONOCYTES # BLD AUTO: 0.59 K/UL (ref 0–0.85)
MONOCYTES NFR BLD AUTO: 7.3 % (ref 0–13.4)
NEUTROPHILS # BLD AUTO: 5.19 K/UL (ref 1.82–7.42)
NEUTROPHILS NFR BLD: 64.2 % (ref 44–72)
NRBC # BLD AUTO: 0 K/UL
NRBC BLD-RTO: 0 /100 WBC
NT-PROBNP SERPL IA-MCNC: 1971 PG/ML (ref 0–125)
PLATELET # BLD AUTO: 216 K/UL (ref 164–446)
PMV BLD AUTO: 9.6 FL (ref 9–12.9)
POTASSIUM SERPL-SCNC: 3.4 MMOL/L (ref 3.6–5.5)
PROT SERPL-MCNC: 7.3 G/DL (ref 6–8.2)
RBC # BLD AUTO: 5.97 M/UL (ref 4.7–6.1)
SODIUM SERPL-SCNC: 141 MMOL/L (ref 135–145)
TROPONIN T SERPL-MCNC: 14 NG/L (ref 6–19)
WBC # BLD AUTO: 8.1 K/UL (ref 4.8–10.8)

## 2022-09-18 PROCEDURE — 99285 EMERGENCY DEPT VISIT HI MDM: CPT

## 2022-09-18 PROCEDURE — 96374 THER/PROPH/DIAG INJ IV PUSH: CPT

## 2022-09-18 PROCEDURE — 700111 HCHG RX REV CODE 636 W/ 250 OVERRIDE (IP): Performed by: EMERGENCY MEDICINE

## 2022-09-18 PROCEDURE — 85025 COMPLETE CBC W/AUTO DIFF WBC: CPT

## 2022-09-18 PROCEDURE — 83880 ASSAY OF NATRIURETIC PEPTIDE: CPT

## 2022-09-18 PROCEDURE — 80053 COMPREHEN METABOLIC PANEL: CPT

## 2022-09-18 PROCEDURE — 84484 ASSAY OF TROPONIN QUANT: CPT

## 2022-09-18 PROCEDURE — 96375 TX/PRO/DX INJ NEW DRUG ADDON: CPT

## 2022-09-18 PROCEDURE — 93005 ELECTROCARDIOGRAM TRACING: CPT | Performed by: EMERGENCY MEDICINE

## 2022-09-18 PROCEDURE — 93005 ELECTROCARDIOGRAM TRACING: CPT

## 2022-09-18 PROCEDURE — 700101 HCHG RX REV CODE 250: Performed by: EMERGENCY MEDICINE

## 2022-09-18 PROCEDURE — 36415 COLL VENOUS BLD VENIPUNCTURE: CPT

## 2022-09-18 PROCEDURE — 71045 X-RAY EXAM CHEST 1 VIEW: CPT

## 2022-09-18 RX ORDER — ATORVASTATIN CALCIUM 10 MG/1
20 TABLET, FILM COATED ORAL DAILY
Qty: 60 TABLET | Refills: 0 | Status: SHIPPED | OUTPATIENT
Start: 2022-09-18 | End: 2022-10-18

## 2022-09-18 RX ORDER — METOPROLOL SUCCINATE 25 MG/1
25 TABLET, EXTENDED RELEASE ORAL 2 TIMES DAILY
Qty: 60 TABLET | Refills: 0 | Status: SHIPPED | OUTPATIENT
Start: 2022-09-18 | End: 2022-10-18

## 2022-09-18 RX ORDER — METOPROLOL TARTRATE 1 MG/ML
5 INJECTION, SOLUTION INTRAVENOUS ONCE
Status: COMPLETED | OUTPATIENT
Start: 2022-09-18 | End: 2022-09-18

## 2022-09-18 RX ORDER — FUROSEMIDE 10 MG/ML
40 INJECTION INTRAMUSCULAR; INTRAVENOUS ONCE
Status: COMPLETED | OUTPATIENT
Start: 2022-09-18 | End: 2022-09-18

## 2022-09-18 RX ADMIN — FUROSEMIDE 40 MG: 10 INJECTION, SOLUTION INTRAMUSCULAR; INTRAVENOUS at 23:06

## 2022-09-18 RX ADMIN — METOROPROLOL TARTRATE 5 MG: 5 INJECTION, SOLUTION INTRAVENOUS at 23:06

## 2022-09-18 ASSESSMENT — FIBROSIS 4 INDEX
FIB4 SCORE: 1.11
FIB4 SCORE: 1.11

## 2022-09-18 ASSESSMENT — PAIN DESCRIPTION - PAIN TYPE: TYPE: ACUTE PAIN

## 2022-09-19 VITALS
BODY MASS INDEX: 36.45 KG/M2 | RESPIRATION RATE: 18 BRPM | DIASTOLIC BLOOD PRESSURE: 101 MMHG | OXYGEN SATURATION: 93 % | HEART RATE: 75 BPM | SYSTOLIC BLOOD PRESSURE: 167 MMHG | TEMPERATURE: 98.1 F | WEIGHT: 315 LBS | HEIGHT: 78 IN

## 2022-09-19 NOTE — ED PROVIDER NOTES
ED Provider Note    CHIEF COMPLAINT  Chief Complaint   Patient presents with    Blood Pressure Problem     Was recently diagnosed with HTN July 26th, compliant with meds and urgent care check ups, no relief with current Rx, states BP at home consistently 190/130    Shortness of Breath     Similar to prior episode with fluid in lungs, worse with laying down       HPI  Gage Garcia is a 46 y.o. male who presents to the emergency department complaining of elevated blood pressure and increasing shortness of breath. Past medical history significant for known CHF, hypertension, obesity and continued smoking.    He notes that he was diagnosed with hypertension earlier this summer and started on multiple medications including lisinopril, metoprolol, spironolactone, Lasix, aspirin and atorvastatin.    He recently had outpatient urgent care visit which refilled most of these other than the aspirin. On bottle evaluation here tonight his metoprolol and spironolactone doses were refilled at a once daily regimen and set up twice daily regimen having these total daily dose things.    Now with increasing shortness of breath especially worse with lying down. Denies any significant leg pain or swelling. Has not seen a cardiologist. Will not be getting medical insurance until the new year in roughly 3 months.    REVIEW OF SYSTEMS  See HPI for further details. All other systems are negative.     PAST MEDICAL HISTORY   has a past medical history of Hypertension, Obesity, Smoker, and Strep pharyngitis.    SOCIAL HISTORY  Social History     Tobacco Use    Smoking status: Every Day     Packs/day: 0.50     Types: Cigarettes    Smokeless tobacco: Never   Vaping Use    Vaping Use: Never used   Substance and Sexual Activity    Alcohol use: Not Currently     Comment: occ    Drug use: Yes     Types: Inhaled, Marijuana     Comment: thc    Sexual activity: Yes       SURGICAL HISTORY  patient denies any surgical history    CURRENT MEDICATIONS  Home  "Medications       Reviewed by Mayra Valle R.N. (Registered Nurse) on 09/18/22 at 2120  Med List Status: Partial     Medication Last Dose Status   aspirin (ASA) 81 MG Chew Tab chewable tablet  Active   atorvastatin (LIPITOR) 10 MG Tab  Active   furosemide (LASIX) 20 MG Tab  Active   lisinopril (PRINIVIL) 20 MG Tab  Active   metoprolol SR (TOPROL XL) 25 MG TABLET SR 24 HR  Active   spironolactone (ALDACTONE) 25 MG Tab  Active                    ALLERGIES  No Known Allergies    PHYSICAL EXAM  VITAL SIGNS: BP (!) 167/101   Pulse 75   Temp 36.7 °C (98.1 °F) (Temporal)   Resp 18   Ht 2.083 m (6' 10\")   Wt (!) 152 kg (335 lb 1.6 oz)   SpO2 93%   BMI 35.04 kg/m²  @LENA[[id number]::@   Pulse ox interpretation: I interpret this pulse ox as normal.  Constitutional: Alert in no apparent distress.  HENT: No signs of trauma, Bilateral external ears normal, Nose normal.   Eyes: Pupils are equal and reactive  Neck: Normal range of motion, No tenderness, Supple  Cardiovascular: Regular rate and rhythm, no murmurs.   Thorax & Lungs: Normal breath sounds, No respiratory distress, No wheezing, No chest tenderness.   Abdomen: Bowel sounds normal, Soft, No tenderness  Skin: Warm, Dry, No erythema, No rash.   Extremities: Intact distal pulses, No edema, No tenderness  Musculoskeletal: Good range of motion in all major joints. No tenderness to palpation or major deformities noted.   Neurologic: Alert , Normal motor function, Normal sensory function, No focal deficits noted.   Psychiatric: Affect normal, Judgment normal, Mood normal.       DIAGNOSTIC STUDIES / PROCEDURES        LABS  Results for orders placed or performed during the hospital encounter of 09/18/22   CBC with Differential   Result Value Ref Range    WBC 8.1 4.8 - 10.8 K/uL    RBC 5.97 4.70 - 6.10 M/uL    Hemoglobin 16.9 14.0 - 18.0 g/dL    Hematocrit 48.2 42.0 - 52.0 %    MCV 80.7 (L) 81.4 - 97.8 fL    MCH 28.3 27.0 - 33.0 pg    MCHC 35.1 33.7 - 35.3 g/dL    RDW 42.3 " 35.9 - 50.0 fL    Platelet Count 216 164 - 446 K/uL    MPV 9.6 9.0 - 12.9 fL    Neutrophils-Polys 64.20 44.00 - 72.00 %    Lymphocytes 22.30 22.00 - 41.00 %    Monocytes 7.30 0.00 - 13.40 %    Eosinophils 5.20 0.00 - 6.90 %    Basophils 0.60 0.00 - 1.80 %    Immature Granulocytes 0.40 0.00 - 0.90 %    Nucleated RBC 0.00 /100 WBC    Neutrophils (Absolute) 5.19 1.82 - 7.42 K/uL    Lymphs (Absolute) 1.80 1.00 - 4.80 K/uL    Monos (Absolute) 0.59 0.00 - 0.85 K/uL    Eos (Absolute) 0.42 0.00 - 0.51 K/uL    Baso (Absolute) 0.05 0.00 - 0.12 K/uL    Immature Granulocytes (abs) 0.03 0.00 - 0.11 K/uL    NRBC (Absolute) 0.00 K/uL   Comp Metabolic Panel   Result Value Ref Range    Sodium 141 135 - 145 mmol/L    Potassium 3.4 (L) 3.6 - 5.5 mmol/L    Chloride 103 96 - 112 mmol/L    Co2 29 20 - 33 mmol/L    Anion Gap 9.0 7.0 - 16.0    Glucose 104 (H) 65 - 99 mg/dL    Bun 11 8 - 22 mg/dL    Creatinine 1.12 0.50 - 1.40 mg/dL    Calcium 9.2 8.5 - 10.5 mg/dL    AST(SGOT) 20 12 - 45 U/L    ALT(SGPT) 18 2 - 50 U/L    Alkaline Phosphatase 57 30 - 99 U/L    Total Bilirubin 0.8 0.1 - 1.5 mg/dL    Albumin 4.2 3.2 - 4.9 g/dL    Total Protein 7.3 6.0 - 8.2 g/dL    Globulin 3.1 1.9 - 3.5 g/dL    A-G Ratio 1.4 g/dL   ESTIMATED GFR   Result Value Ref Range    GFR (CKD-EPI) 82 >60 mL/min/1.73 m 2   proBrain Natriuretic Peptide, NT   Result Value Ref Range    NT-proBNP 1971 (H) 0 - 125 pg/mL   TROPONIN   Result Value Ref Range    Troponin T 14 6 - 19 ng/L   EKG (NOW)   Result Value Ref Range    Report       [facility] Emergency Dept.    Test Date:  2022  Pt Name:    GEETHA BLANCO                  Department: ER  MRN:        [id number]                      Room:  Gender:     Male                         Technician: [id number]  :        1975                   Requested By:ER TRIAGE PROTOCOL  Order #:    [id number]                    Reading MD:    Measurements  Intervals                                Axis  Rate:       82                            P:          -48  VT:         165                          QRS:        -26  QRSD:       106                          T:          48  QT:         427  QTc:        499    Interpretive Statements  Sinus or ectopic atrial rhythm  Borderline left axis deviation  Borderline prolonged QT interval  Compared to ECG 08/24/2022 16:36:53  Ectopic atrial rhythm now present  Sinus rhythm no longer present  ST (T wave) deviation no longer present  T-wave abnormality no longer present  Q waves no longer present           RADIOLOGY  DX-CHEST-PORTABLE (1 VIEW)   Final Result      No evidence of acute cardiopulmonary process.              COURSE & MEDICAL DECISION MAKING  Pertinent Labs & Imaging studies reviewed. (See chart for details)  patient presented emergency primer with the above complaint primarily of orthopnea but also persistently elevated blood pressure. On medication review does appear that the patient had recent 50% reduction in his metoprolol and atorvastatin. Overall the patient appears well. No chest pain. Troponin is negative. Heart score is low on this presentation. He does have elevated BNP as was clinically suspected given his known heart failure and symptoms as above. He has been provided with dose of metoprolol as well as Lasix here the ER. He will be discharged with medication refill for the two medications that had the reduction back to their twice daily dosing. Furthermore he will increase his Lasix over the next couple of days for ongoing diuresis. He has been referred to cardiology for outpatient follow-up and is understanding return precautions here to the ER if needed.   The patient will return for worsening symptoms and is stable at the time of discharge. The patient verbalizes understanding and will comply.    FINAL IMPRESSION  1. Hypertension, unspecified type    2. Acute on chronic congestive heart failure, unspecified heart failure type (HCC)    3. Medication refill             Electronically signed by: Harvinder Gee M.D., 9/18/2022 10:45 PM

## 2022-09-19 NOTE — ED TRIAGE NOTES
"Chief Complaint   Patient presents with    Blood Pressure Problem     Was recently diagnosed with HTN July 26th, compliant with meds and urgent care check ups, no relief with current Rx, states BP at home consistently 190/130    Shortness of Breath     Similar to prior episode with fluid in lungs, worse with laying down        Ambulated to triage for above complaint. Has list of meds currently taking.    SOB protocols ordered. Pt brought to [facility] office for blood draw. Pt educated of triage process and informed to contact staff if situation changes.    BP (!) 168/115   Pulse 93   Temp 36.4 °C (97.6 °F) (Temporal)   Resp 18   Ht 2.083 m (6' 10\")   Wt (!) 152 kg (334 lb)   SpO2 95% Comment: Room air  BMI 34.92 kg/m²      "

## 2023-02-04 ENCOUNTER — APPOINTMENT (OUTPATIENT)
Dept: RADIOLOGY | Facility: MEDICAL CENTER | Age: 48
DRG: 493 | End: 2023-02-04
Attending: EMERGENCY MEDICINE
Payer: COMMERCIAL

## 2023-02-04 ENCOUNTER — APPOINTMENT (OUTPATIENT)
Dept: RADIOLOGY | Facility: MEDICAL CENTER | Age: 48
DRG: 493 | End: 2023-02-04
Attending: ORTHOPAEDIC SURGERY
Payer: COMMERCIAL

## 2023-02-04 ENCOUNTER — HOSPITAL ENCOUNTER (INPATIENT)
Facility: MEDICAL CENTER | Age: 48
LOS: 5 days | DRG: 493 | End: 2023-02-09
Attending: EMERGENCY MEDICINE | Admitting: HOSPITALIST
Payer: COMMERCIAL

## 2023-02-04 ENCOUNTER — ANESTHESIA EVENT (OUTPATIENT)
Dept: SURGERY | Facility: MEDICAL CENTER | Age: 48
DRG: 493 | End: 2023-02-04
Payer: COMMERCIAL

## 2023-02-04 ENCOUNTER — ANESTHESIA (OUTPATIENT)
Dept: SURGERY | Facility: MEDICAL CENTER | Age: 48
DRG: 493 | End: 2023-02-04
Payer: COMMERCIAL

## 2023-02-04 DIAGNOSIS — S82.892B TYPE I OR II OPEN FRACTURE DISLOCATION OF LEFT ANKLE, INITIAL ENCOUNTER: ICD-10-CM

## 2023-02-04 DIAGNOSIS — I50.22 CHRONIC SYSTOLIC CHF (CONGESTIVE HEART FAILURE) (HCC): ICD-10-CM

## 2023-02-04 DIAGNOSIS — M11.20 PSEUDOGOUT: ICD-10-CM

## 2023-02-04 PROBLEM — E87.6 HYPOKALEMIA: Status: ACTIVE | Noted: 2023-02-04

## 2023-02-04 PROBLEM — F10.10 ALCOHOL CONSUMPTION BINGE DRINKING: Status: ACTIVE | Noted: 2023-02-04

## 2023-02-04 LAB
ABO + RH BLD: NORMAL
ABO GROUP BLD: NORMAL
ALBUMIN SERPL BCP-MCNC: 4 G/DL (ref 3.2–4.9)
ALBUMIN/GLOB SERPL: 1.3 G/DL
ALP SERPL-CCNC: 57 U/L (ref 30–99)
ALT SERPL-CCNC: 21 U/L (ref 2–50)
ANION GAP SERPL CALC-SCNC: 13 MMOL/L (ref 7–16)
AST SERPL-CCNC: 21 U/L (ref 12–45)
BASOPHILS # BLD AUTO: 0.7 % (ref 0–1.8)
BASOPHILS # BLD: 0.07 K/UL (ref 0–0.12)
BILIRUB SERPL-MCNC: 0.3 MG/DL (ref 0.1–1.5)
BLD GP AB SCN SERPL QL: NORMAL
BUN SERPL-MCNC: 24 MG/DL (ref 8–22)
CALCIUM ALBUM COR SERPL-MCNC: 8.4 MG/DL (ref 8.5–10.5)
CALCIUM SERPL-MCNC: 8.4 MG/DL (ref 8.5–10.5)
CHLORIDE SERPL-SCNC: 106 MMOL/L (ref 96–112)
CO2 SERPL-SCNC: 25 MMOL/L (ref 20–33)
CREAT SERPL-MCNC: 1.25 MG/DL (ref 0.5–1.4)
EOSINOPHIL # BLD AUTO: 0.45 K/UL (ref 0–0.51)
EOSINOPHIL NFR BLD: 4.8 % (ref 0–6.9)
ERYTHROCYTE [DISTWIDTH] IN BLOOD BY AUTOMATED COUNT: 42.3 FL (ref 35.9–50)
GFR SERPLBLD CREATININE-BSD FMLA CKD-EPI: 71 ML/MIN/1.73 M 2
GLOBULIN SER CALC-MCNC: 3.1 G/DL (ref 1.9–3.5)
GLUCOSE SERPL-MCNC: 102 MG/DL (ref 65–99)
HCT VFR BLD AUTO: 45.2 % (ref 42–52)
HGB BLD-MCNC: 15 G/DL (ref 14–18)
IMM GRANULOCYTES # BLD AUTO: 0.05 K/UL (ref 0–0.11)
IMM GRANULOCYTES NFR BLD AUTO: 0.5 % (ref 0–0.9)
INR PPP: 0.95 (ref 0.87–1.13)
LYMPHOCYTES # BLD AUTO: 2.15 K/UL (ref 1–4.8)
LYMPHOCYTES NFR BLD: 22.7 % (ref 22–41)
MCH RBC QN AUTO: 28.2 PG (ref 27–33)
MCHC RBC AUTO-ENTMCNC: 33.2 G/DL (ref 33.7–35.3)
MCV RBC AUTO: 85 FL (ref 81.4–97.8)
MONOCYTES # BLD AUTO: 0.6 K/UL (ref 0–0.85)
MONOCYTES NFR BLD AUTO: 6.3 % (ref 0–13.4)
NEUTROPHILS # BLD AUTO: 6.15 K/UL (ref 1.82–7.42)
NEUTROPHILS NFR BLD: 65 % (ref 44–72)
NRBC # BLD AUTO: 0 K/UL
NRBC BLD-RTO: 0 /100 WBC
PLATELET # BLD AUTO: 176 K/UL (ref 164–446)
PMV BLD AUTO: 10.1 FL (ref 9–12.9)
POTASSIUM SERPL-SCNC: 3.4 MMOL/L (ref 3.6–5.5)
PROT SERPL-MCNC: 7.1 G/DL (ref 6–8.2)
PROTHROMBIN TIME: 12.6 SEC (ref 12–14.6)
RBC # BLD AUTO: 5.32 M/UL (ref 4.7–6.1)
RH BLD: NORMAL
SODIUM SERPL-SCNC: 144 MMOL/L (ref 135–145)
WBC # BLD AUTO: 9.5 K/UL (ref 4.8–10.8)

## 2023-02-04 PROCEDURE — 700102 HCHG RX REV CODE 250 W/ 637 OVERRIDE(OP): Performed by: HOSPITALIST

## 2023-02-04 PROCEDURE — 770001 HCHG ROOM/CARE - MED/SURG/GYN PRIV*

## 2023-02-04 PROCEDURE — 71045 X-RAY EXAM CHEST 1 VIEW: CPT

## 2023-02-04 PROCEDURE — 85610 PROTHROMBIN TIME: CPT

## 2023-02-04 PROCEDURE — 99223 1ST HOSP IP/OBS HIGH 75: CPT | Performed by: HOSPITALIST

## 2023-02-04 PROCEDURE — 160029 HCHG SURGERY MINUTES - 1ST 30 MINS LEVEL 4: Performed by: ORTHOPAEDIC SURGERY

## 2023-02-04 PROCEDURE — 160041 HCHG SURGERY MINUTES - EA ADDL 1 MIN LEVEL 4: Performed by: ORTHOPAEDIC SURGERY

## 2023-02-04 PROCEDURE — 73600 X-RAY EXAM OF ANKLE: CPT | Mod: LT

## 2023-02-04 PROCEDURE — 160002 HCHG RECOVERY MINUTES (STAT): Performed by: ORTHOPAEDIC SURGERY

## 2023-02-04 PROCEDURE — 73620 X-RAY EXAM OF FOOT: CPT | Mod: LT

## 2023-02-04 PROCEDURE — 99291 CRITICAL CARE FIRST HOUR: CPT

## 2023-02-04 PROCEDURE — 160035 HCHG PACU - 1ST 60 MINS PHASE I: Performed by: ORTHOPAEDIC SURGERY

## 2023-02-04 PROCEDURE — 27829 TREAT LOWER LEG JOINT: CPT | Mod: LT | Performed by: ORTHOPAEDIC SURGERY

## 2023-02-04 PROCEDURE — 27792 TREATMENT OF ANKLE FRACTURE: CPT | Mod: LT | Performed by: ORTHOPAEDIC SURGERY

## 2023-02-04 PROCEDURE — 99140 ANES COMP EMERGENCY COND: CPT | Performed by: ANESTHESIOLOGY

## 2023-02-04 PROCEDURE — 160009 HCHG ANES TIME/MIN: Performed by: ORTHOPAEDIC SURGERY

## 2023-02-04 PROCEDURE — 80053 COMPREHEN METABOLIC PANEL: CPT

## 2023-02-04 PROCEDURE — 86900 BLOOD TYPING SEROLOGIC ABO: CPT

## 2023-02-04 PROCEDURE — 700111 HCHG RX REV CODE 636 W/ 250 OVERRIDE (IP): Performed by: ANESTHESIOLOGY

## 2023-02-04 PROCEDURE — 700105 HCHG RX REV CODE 258: Performed by: HOSPITALIST

## 2023-02-04 PROCEDURE — 01480 ANES OPEN PX LOWER L/A/F NOS: CPT | Performed by: ANESTHESIOLOGY

## 2023-02-04 PROCEDURE — 86901 BLOOD TYPING SEROLOGIC RH(D): CPT

## 2023-02-04 PROCEDURE — 11012 DEB SKIN BONE AT FX SITE: CPT | Mod: LT | Performed by: ORTHOPAEDIC SURGERY

## 2023-02-04 PROCEDURE — 0QHK04Z INSERTION OF INTERNAL FIXATION DEVICE INTO LEFT FIBULA, OPEN APPROACH: ICD-10-PCS | Performed by: ORTHOPAEDIC SURGERY

## 2023-02-04 PROCEDURE — 86850 RBC ANTIBODY SCREEN: CPT

## 2023-02-04 PROCEDURE — 99223 1ST HOSP IP/OBS HIGH 75: CPT | Mod: 57 | Performed by: ORTHOPAEDIC SURGERY

## 2023-02-04 PROCEDURE — 85025 COMPLETE CBC W/AUTO DIFF WBC: CPT

## 2023-02-04 PROCEDURE — 27840 TREAT ANKLE DISLOCATION: CPT

## 2023-02-04 PROCEDURE — 73590 X-RAY EXAM OF LOWER LEG: CPT | Mod: LT

## 2023-02-04 PROCEDURE — A9270 NON-COVERED ITEM OR SERVICE: HCPCS | Performed by: HOSPITALIST

## 2023-02-04 PROCEDURE — 96375 TX/PRO/DX INJ NEW DRUG ADDON: CPT

## 2023-02-04 PROCEDURE — 700101 HCHG RX REV CODE 250: Performed by: ANESTHESIOLOGY

## 2023-02-04 PROCEDURE — C1713 ANCHOR/SCREW BN/BN,TIS/BN: HCPCS | Performed by: ORTHOPAEDIC SURGERY

## 2023-02-04 PROCEDURE — 0QSHXZZ REPOSITION LEFT TIBIA, EXTERNAL APPROACH: ICD-10-PCS | Performed by: ORTHOPAEDIC SURGERY

## 2023-02-04 PROCEDURE — 700101 HCHG RX REV CODE 250: Performed by: HOSPITALIST

## 2023-02-04 PROCEDURE — 36415 COLL VENOUS BLD VENIPUNCTURE: CPT

## 2023-02-04 PROCEDURE — 160048 HCHG OR STATISTICAL LEVEL 1-5: Performed by: ORTHOPAEDIC SURGERY

## 2023-02-04 PROCEDURE — 73610 X-RAY EXAM OF ANKLE: CPT | Mod: LT

## 2023-02-04 PROCEDURE — 700111 HCHG RX REV CODE 636 W/ 250 OVERRIDE (IP): Performed by: EMERGENCY MEDICINE

## 2023-02-04 PROCEDURE — 700111 HCHG RX REV CODE 636 W/ 250 OVERRIDE (IP): Performed by: HOSPITALIST

## 2023-02-04 PROCEDURE — 700105 HCHG RX REV CODE 258: Performed by: ANESTHESIOLOGY

## 2023-02-04 PROCEDURE — 96374 THER/PROPH/DIAG INJ IV PUSH: CPT

## 2023-02-04 DEVICE — SCREW 2.5 MM NON-LOCKING TI X 18MM LONG (6TX8=48): Type: IMPLANTABLE DEVICE | Site: ANKLE | Status: FUNCTIONAL

## 2023-02-04 DEVICE — SCREW 3.5 MM NON-LOCKING TI X 18MM LONG (6TX8+2TX5=58): Type: IMPLANTABLE DEVICE | Site: ANKLE | Status: FUNCTIONAL

## 2023-02-04 DEVICE — SCREW 2.5 MM NON-LOCKING TI X 16MM LONG (6TX8=48): Type: IMPLANTABLE DEVICE | Site: ANKLE | Status: FUNCTIONAL

## 2023-02-04 DEVICE — SCREW 3.5 MM NON-LOCKING TI X 50MM LONG (6TX8=48): Type: IMPLANTABLE DEVICE | Site: ANKLE | Status: FUNCTIONAL

## 2023-02-04 DEVICE — PLATE DISTAL FIBULA 9H (4TX1=4): Type: IMPLANTABLE DEVICE | Site: ANKLE | Status: FUNCTIONAL

## 2023-02-04 DEVICE — SCREW 3.5 MM NON-LOCKING TI X 16MM LONG (6TX8+2TX5=58): Type: IMPLANTABLE DEVICE | Site: ANKLE | Status: FUNCTIONAL

## 2023-02-04 DEVICE — SCREW 2.5 MM LOCKING TI X 14MM LONG (6TX8=48): Type: IMPLANTABLE DEVICE | Site: ANKLE | Status: FUNCTIONAL

## 2023-02-04 DEVICE — SCREW 3.5 MM NON-LOCKING TI X 14MM LONG (6TX8+2TX5=58): Type: IMPLANTABLE DEVICE | Site: ANKLE | Status: FUNCTIONAL

## 2023-02-04 DEVICE — SCREW 2.5 MM LOCKING TI X 16MM LONG (6TX8=48): Type: IMPLANTABLE DEVICE | Site: ANKLE | Status: FUNCTIONAL

## 2023-02-04 RX ORDER — HYDROMORPHONE HYDROCHLORIDE 1 MG/ML
1 INJECTION, SOLUTION INTRAMUSCULAR; INTRAVENOUS; SUBCUTANEOUS ONCE
Status: COMPLETED | OUTPATIENT
Start: 2023-02-04 | End: 2023-02-04

## 2023-02-04 RX ORDER — LABETALOL HYDROCHLORIDE 5 MG/ML
10 INJECTION, SOLUTION INTRAVENOUS EVERY 4 HOURS PRN
Status: DISCONTINUED | OUTPATIENT
Start: 2023-02-04 | End: 2023-02-04

## 2023-02-04 RX ORDER — IPRATROPIUM BROMIDE AND ALBUTEROL SULFATE 2.5; .5 MG/3ML; MG/3ML
3 SOLUTION RESPIRATORY (INHALATION)
Status: DISCONTINUED | OUTPATIENT
Start: 2023-02-04 | End: 2023-02-04 | Stop reason: HOSPADM

## 2023-02-04 RX ORDER — HYDROMORPHONE HYDROCHLORIDE 1 MG/ML
0.1 INJECTION, SOLUTION INTRAMUSCULAR; INTRAVENOUS; SUBCUTANEOUS
Status: DISCONTINUED | OUTPATIENT
Start: 2023-02-04 | End: 2023-02-04 | Stop reason: HOSPADM

## 2023-02-04 RX ORDER — PROMETHAZINE HYDROCHLORIDE 25 MG/1
12.5-25 TABLET ORAL EVERY 4 HOURS PRN
Status: DISCONTINUED | OUTPATIENT
Start: 2023-02-04 | End: 2023-02-09 | Stop reason: HOSPADM

## 2023-02-04 RX ORDER — DEXAMETHASONE SODIUM PHOSPHATE 4 MG/ML
INJECTION, SOLUTION INTRA-ARTICULAR; INTRALESIONAL; INTRAMUSCULAR; INTRAVENOUS; SOFT TISSUE PRN
Status: DISCONTINUED | OUTPATIENT
Start: 2023-02-04 | End: 2023-02-04 | Stop reason: SURG

## 2023-02-04 RX ORDER — HYDROMORPHONE HYDROCHLORIDE 1 MG/ML
0.2 INJECTION, SOLUTION INTRAMUSCULAR; INTRAVENOUS; SUBCUTANEOUS
Status: DISCONTINUED | OUTPATIENT
Start: 2023-02-04 | End: 2023-02-04 | Stop reason: HOSPADM

## 2023-02-04 RX ORDER — AMOXICILLIN 250 MG
2 CAPSULE ORAL 2 TIMES DAILY
Status: DISCONTINUED | OUTPATIENT
Start: 2023-02-04 | End: 2023-02-09 | Stop reason: HOSPADM

## 2023-02-04 RX ORDER — POLYETHYLENE GLYCOL 3350 17 G/17G
1 POWDER, FOR SOLUTION ORAL
Status: DISCONTINUED | OUTPATIENT
Start: 2023-02-04 | End: 2023-02-09 | Stop reason: HOSPADM

## 2023-02-04 RX ORDER — ONDANSETRON 2 MG/ML
4 INJECTION INTRAMUSCULAR; INTRAVENOUS EVERY 4 HOURS PRN
Status: DISCONTINUED | OUTPATIENT
Start: 2023-02-04 | End: 2023-02-09 | Stop reason: HOSPADM

## 2023-02-04 RX ORDER — SODIUM CHLORIDE, SODIUM LACTATE, POTASSIUM CHLORIDE, CALCIUM CHLORIDE 600; 310; 30; 20 MG/100ML; MG/100ML; MG/100ML; MG/100ML
INJECTION, SOLUTION INTRAVENOUS CONTINUOUS
Status: DISCONTINUED | OUTPATIENT
Start: 2023-02-04 | End: 2023-02-04 | Stop reason: HOSPADM

## 2023-02-04 RX ORDER — OXYCODONE HYDROCHLORIDE 10 MG/1
10 TABLET ORAL
Status: DISCONTINUED | OUTPATIENT
Start: 2023-02-04 | End: 2023-02-06

## 2023-02-04 RX ORDER — CEFAZOLIN SODIUM 1 G/3ML
INJECTION, POWDER, FOR SOLUTION INTRAMUSCULAR; INTRAVENOUS PRN
Status: DISCONTINUED | OUTPATIENT
Start: 2023-02-04 | End: 2023-02-04 | Stop reason: SURG

## 2023-02-04 RX ORDER — POTASSIUM CHLORIDE 20 MEQ/1
40 TABLET, EXTENDED RELEASE ORAL ONCE
Status: COMPLETED | OUTPATIENT
Start: 2023-02-04 | End: 2023-02-04

## 2023-02-04 RX ORDER — OXYCODONE HCL 5 MG/5 ML
10 SOLUTION, ORAL ORAL
Status: DISCONTINUED | OUTPATIENT
Start: 2023-02-04 | End: 2023-02-04 | Stop reason: HOSPADM

## 2023-02-04 RX ORDER — LISINOPRIL 40 MG/1
40 TABLET ORAL DAILY
Status: ON HOLD | COMMUNITY
End: 2023-02-09

## 2023-02-04 RX ORDER — OXYCODONE HYDROCHLORIDE 5 MG/1
5 TABLET ORAL EVERY 4 HOURS PRN
Status: ON HOLD | COMMUNITY
End: 2023-02-09

## 2023-02-04 RX ORDER — BISACODYL 10 MG
10 SUPPOSITORY, RECTAL RECTAL
Status: DISCONTINUED | OUTPATIENT
Start: 2023-02-04 | End: 2023-02-09 | Stop reason: HOSPADM

## 2023-02-04 RX ORDER — PHENYLEPHRINE HYDROCHLORIDE 10 MG/ML
INJECTION, SOLUTION INTRAMUSCULAR; INTRAVENOUS; SUBCUTANEOUS PRN
Status: DISCONTINUED | OUTPATIENT
Start: 2023-02-04 | End: 2023-02-04 | Stop reason: SURG

## 2023-02-04 RX ORDER — MEPERIDINE HYDROCHLORIDE 25 MG/ML
12.5 INJECTION INTRAMUSCULAR; INTRAVENOUS; SUBCUTANEOUS
Status: DISCONTINUED | OUTPATIENT
Start: 2023-02-04 | End: 2023-02-04 | Stop reason: HOSPADM

## 2023-02-04 RX ORDER — MAGNESIUM SULFATE HEPTAHYDRATE 40 MG/ML
2 INJECTION, SOLUTION INTRAVENOUS ONCE
Status: COMPLETED | OUTPATIENT
Start: 2023-02-04 | End: 2023-02-04

## 2023-02-04 RX ORDER — HALOPERIDOL 5 MG/ML
1 INJECTION INTRAMUSCULAR
Status: DISCONTINUED | OUTPATIENT
Start: 2023-02-04 | End: 2023-02-04 | Stop reason: HOSPADM

## 2023-02-04 RX ORDER — PROMETHAZINE HYDROCHLORIDE 25 MG/1
12.5-25 SUPPOSITORY RECTAL EVERY 4 HOURS PRN
Status: DISCONTINUED | OUTPATIENT
Start: 2023-02-04 | End: 2023-02-09 | Stop reason: HOSPADM

## 2023-02-04 RX ORDER — HYDROMORPHONE HYDROCHLORIDE 2 MG/ML
INJECTION, SOLUTION INTRAMUSCULAR; INTRAVENOUS; SUBCUTANEOUS PRN
Status: DISCONTINUED | OUTPATIENT
Start: 2023-02-04 | End: 2023-02-04 | Stop reason: SURG

## 2023-02-04 RX ORDER — ONDANSETRON 2 MG/ML
INJECTION INTRAMUSCULAR; INTRAVENOUS PRN
Status: DISCONTINUED | OUTPATIENT
Start: 2023-02-04 | End: 2023-02-04 | Stop reason: SURG

## 2023-02-04 RX ORDER — LABETALOL HYDROCHLORIDE 5 MG/ML
20 INJECTION, SOLUTION INTRAVENOUS EVERY 4 HOURS PRN
Status: DISCONTINUED | OUTPATIENT
Start: 2023-02-04 | End: 2023-02-09 | Stop reason: HOSPADM

## 2023-02-04 RX ORDER — HYDROMORPHONE HYDROCHLORIDE 1 MG/ML
0.4 INJECTION, SOLUTION INTRAMUSCULAR; INTRAVENOUS; SUBCUTANEOUS
Status: DISCONTINUED | OUTPATIENT
Start: 2023-02-04 | End: 2023-02-04 | Stop reason: HOSPADM

## 2023-02-04 RX ORDER — ATORVASTATIN CALCIUM 10 MG/1
10 TABLET, FILM COATED ORAL NIGHTLY
Status: ON HOLD | COMMUNITY
End: 2023-03-18 | Stop reason: SDUPTHER

## 2023-02-04 RX ORDER — OXYCODONE HYDROCHLORIDE 5 MG/1
5 TABLET ORAL EVERY 4 HOURS PRN
Qty: 20 TABLET | Refills: 0 | Status: SHIPPED | OUTPATIENT
Start: 2023-02-04 | End: 2023-02-09

## 2023-02-04 RX ORDER — ASPIRIN 81 MG/1
81 TABLET, CHEWABLE ORAL DAILY
Status: DISCONTINUED | OUTPATIENT
Start: 2023-02-04 | End: 2023-02-09 | Stop reason: HOSPADM

## 2023-02-04 RX ORDER — ONDANSETRON 4 MG/1
4 TABLET, ORALLY DISINTEGRATING ORAL EVERY 4 HOURS PRN
Status: DISCONTINUED | OUTPATIENT
Start: 2023-02-04 | End: 2023-02-09 | Stop reason: HOSPADM

## 2023-02-04 RX ORDER — OXYCODONE HYDROCHLORIDE 5 MG/1
5 TABLET ORAL
Status: DISCONTINUED | OUTPATIENT
Start: 2023-02-04 | End: 2023-02-06

## 2023-02-04 RX ORDER — PROCHLORPERAZINE EDISYLATE 5 MG/ML
5-10 INJECTION INTRAMUSCULAR; INTRAVENOUS EVERY 4 HOURS PRN
Status: DISCONTINUED | OUTPATIENT
Start: 2023-02-04 | End: 2023-02-09 | Stop reason: HOSPADM

## 2023-02-04 RX ORDER — ACETAMINOPHEN 325 MG/1
650 TABLET ORAL EVERY 6 HOURS PRN
Status: DISCONTINUED | OUTPATIENT
Start: 2023-02-04 | End: 2023-02-09 | Stop reason: HOSPADM

## 2023-02-04 RX ORDER — CARVEDILOL 3.12 MG/1
3.12 TABLET ORAL 2 TIMES DAILY WITH MEALS
Status: DISCONTINUED | OUTPATIENT
Start: 2023-02-04 | End: 2023-02-06

## 2023-02-04 RX ORDER — PROPOFOL 10 MG/ML
200 INJECTION, EMULSION INTRAVENOUS ONCE
Status: COMPLETED | OUTPATIENT
Start: 2023-02-04 | End: 2023-02-04

## 2023-02-04 RX ORDER — FUROSEMIDE 20 MG/1
20 TABLET ORAL
Status: DISCONTINUED | OUTPATIENT
Start: 2023-02-05 | End: 2023-02-05

## 2023-02-04 RX ORDER — LIDOCAINE HYDROCHLORIDE 20 MG/ML
INJECTION, SOLUTION EPIDURAL; INFILTRATION; INTRACAUDAL; PERINEURAL PRN
Status: DISCONTINUED | OUTPATIENT
Start: 2023-02-04 | End: 2023-02-04 | Stop reason: SURG

## 2023-02-04 RX ORDER — LISINOPRIL 20 MG/1
20 TABLET ORAL DAILY
Status: DISCONTINUED | OUTPATIENT
Start: 2023-02-04 | End: 2023-02-05

## 2023-02-04 RX ORDER — HYDROMORPHONE HYDROCHLORIDE 1 MG/ML
0.5 INJECTION, SOLUTION INTRAMUSCULAR; INTRAVENOUS; SUBCUTANEOUS
Status: DISCONTINUED | OUTPATIENT
Start: 2023-02-04 | End: 2023-02-06

## 2023-02-04 RX ORDER — ATORVASTATIN CALCIUM 10 MG/1
10 TABLET, FILM COATED ORAL NIGHTLY
Status: DISCONTINUED | OUTPATIENT
Start: 2023-02-04 | End: 2023-02-09 | Stop reason: HOSPADM

## 2023-02-04 RX ORDER — METOPROLOL SUCCINATE 25 MG/1
25 TABLET, EXTENDED RELEASE ORAL DAILY
Status: ON HOLD | COMMUNITY
End: 2023-02-09

## 2023-02-04 RX ORDER — KETOROLAC TROMETHAMINE 30 MG/ML
INJECTION, SOLUTION INTRAMUSCULAR; INTRAVENOUS PRN
Status: DISCONTINUED | OUTPATIENT
Start: 2023-02-04 | End: 2023-02-04 | Stop reason: SURG

## 2023-02-04 RX ORDER — OXYCODONE HCL 5 MG/5 ML
5 SOLUTION, ORAL ORAL
Status: DISCONTINUED | OUTPATIENT
Start: 2023-02-04 | End: 2023-02-04 | Stop reason: HOSPADM

## 2023-02-04 RX ORDER — SPIRONOLACTONE 25 MG/1
25 TABLET ORAL DAILY
Status: DISCONTINUED | OUTPATIENT
Start: 2023-02-04 | End: 2023-02-09 | Stop reason: HOSPADM

## 2023-02-04 RX ORDER — SODIUM CHLORIDE, SODIUM LACTATE, POTASSIUM CHLORIDE, CALCIUM CHLORIDE 600; 310; 30; 20 MG/100ML; MG/100ML; MG/100ML; MG/100ML
INJECTION, SOLUTION INTRAVENOUS
Status: DISCONTINUED | OUTPATIENT
Start: 2023-02-04 | End: 2023-02-04 | Stop reason: SURG

## 2023-02-04 RX ORDER — HYDRALAZINE HYDROCHLORIDE 20 MG/ML
5 INJECTION INTRAMUSCULAR; INTRAVENOUS
Status: DISCONTINUED | OUTPATIENT
Start: 2023-02-04 | End: 2023-02-04 | Stop reason: HOSPADM

## 2023-02-04 RX ORDER — ONDANSETRON 2 MG/ML
4 INJECTION INTRAMUSCULAR; INTRAVENOUS
Status: DISCONTINUED | OUTPATIENT
Start: 2023-02-04 | End: 2023-02-04 | Stop reason: HOSPADM

## 2023-02-04 RX ADMIN — MAGNESIUM SULFATE HEPTAHYDRATE 2 G: 40 INJECTION, SOLUTION INTRAVENOUS at 14:48

## 2023-02-04 RX ADMIN — PROPOFOL 130 MG: 10 INJECTION, EMULSION INTRAVENOUS at 07:53

## 2023-02-04 RX ADMIN — ONDANSETRON 4 MG: 2 INJECTION INTRAMUSCULAR; INTRAVENOUS at 09:04

## 2023-02-04 RX ADMIN — ATORVASTATIN CALCIUM 10 MG: 10 TABLET, FILM COATED ORAL at 20:29

## 2023-02-04 RX ADMIN — HYDROMORPHONE HYDROCHLORIDE 1 MG: 1 INJECTION, SOLUTION INTRAMUSCULAR; INTRAVENOUS; SUBCUTANEOUS at 08:09

## 2023-02-04 RX ADMIN — PHENYLEPHRINE HYDROCHLORIDE 200 MCG: 10 INJECTION INTRAVENOUS at 09:04

## 2023-02-04 RX ADMIN — ACETAMINOPHEN 650 MG: 325 TABLET, FILM COATED ORAL at 20:01

## 2023-02-04 RX ADMIN — LABETALOL HYDROCHLORIDE 20 MG: 5 INJECTION, SOLUTION INTRAVENOUS at 10:40

## 2023-02-04 RX ADMIN — CARVEDILOL 3.12 MG: 3.12 TABLET, FILM COATED ORAL at 17:20

## 2023-02-04 RX ADMIN — LABETALOL HYDROCHLORIDE 20 MG: 5 INJECTION, SOLUTION INTRAVENOUS at 16:16

## 2023-02-04 RX ADMIN — OXYCODONE HYDROCHLORIDE 10 MG: 10 TABLET ORAL at 22:44

## 2023-02-04 RX ADMIN — HYDRALAZINE HYDROCHLORIDE 5 MG: 20 INJECTION INTRAMUSCULAR; INTRAVENOUS at 10:17

## 2023-02-04 RX ADMIN — PROPOFOL 200 MG: 10 INJECTION, EMULSION INTRAVENOUS at 09:04

## 2023-02-04 RX ADMIN — LISINOPRIL 20 MG: 20 TABLET ORAL at 14:33

## 2023-02-04 RX ADMIN — POTASSIUM CHLORIDE 40 MEQ: 1500 TABLET, EXTENDED RELEASE ORAL at 14:33

## 2023-02-04 RX ADMIN — OXYCODONE HYDROCHLORIDE 10 MG: 10 TABLET ORAL at 17:20

## 2023-02-04 RX ADMIN — ASPIRIN 81 MG: 81 TABLET, CHEWABLE ORAL at 17:20

## 2023-02-04 RX ADMIN — LIDOCAINE HYDROCHLORIDE 100 MG: 20 INJECTION, SOLUTION EPIDURAL; INFILTRATION; INTRACAUDAL at 09:04

## 2023-02-04 RX ADMIN — CEFAZOLIN 2 G: 2 INJECTION, POWDER, FOR SOLUTION INTRAMUSCULAR; INTRAVENOUS at 17:27

## 2023-02-04 RX ADMIN — SODIUM CHLORIDE, POTASSIUM CHLORIDE, SODIUM LACTATE AND CALCIUM CHLORIDE: 600; 310; 30; 20 INJECTION, SOLUTION INTRAVENOUS at 09:04

## 2023-02-04 RX ADMIN — CEFAZOLIN 3 G: 330 INJECTION, POWDER, FOR SOLUTION INTRAMUSCULAR; INTRAVENOUS at 09:13

## 2023-02-04 RX ADMIN — KETOROLAC TROMETHAMINE 30 MG: 30 INJECTION, SOLUTION INTRAMUSCULAR at 09:50

## 2023-02-04 RX ADMIN — HYDRALAZINE HYDROCHLORIDE 5 MG: 20 INJECTION INTRAMUSCULAR; INTRAVENOUS at 10:22

## 2023-02-04 RX ADMIN — DEXAMETHASONE SODIUM PHOSPHATE 4 MG: 4 INJECTION, SOLUTION INTRA-ARTICULAR; INTRALESIONAL; INTRAMUSCULAR; INTRAVENOUS; SOFT TISSUE at 09:04

## 2023-02-04 RX ADMIN — SPIRONOLACTONE 25 MG: 25 TABLET ORAL at 14:33

## 2023-02-04 RX ADMIN — HYDROMORPHONE HYDROCHLORIDE 2 MG: 2 INJECTION INTRAMUSCULAR; INTRAVENOUS; SUBCUTANEOUS at 09:31

## 2023-02-04 ASSESSMENT — PATIENT HEALTH QUESTIONNAIRE - PHQ9
1. LITTLE INTEREST OR PLEASURE IN DOING THINGS: NOT AT ALL
SUM OF ALL RESPONSES TO PHQ9 QUESTIONS 1 AND 2: 0
2. FEELING DOWN, DEPRESSED, IRRITABLE, OR HOPELESS: NOT AT ALL
2. FEELING DOWN, DEPRESSED, IRRITABLE, OR HOPELESS: NOT AT ALL
SUM OF ALL RESPONSES TO PHQ9 QUESTIONS 1 AND 2: 0
1. LITTLE INTEREST OR PLEASURE IN DOING THINGS: NOT AT ALL

## 2023-02-04 ASSESSMENT — LIFESTYLE VARIABLES
TOTAL SCORE: 2
HOW MANY TIMES IN THE PAST YEAR HAVE YOU HAD 5 OR MORE DRINKS IN A DAY: 2
HAVE PEOPLE ANNOYED YOU BY CRITICIZING YOUR DRINKING: NO
TOTAL SCORE: 2
ON A TYPICAL DAY WHEN YOU DRINK ALCOHOL HOW MANY DRINKS DO YOU HAVE: 1
AVERAGE NUMBER OF DAYS PER WEEK YOU HAVE A DRINK CONTAINING ALCOHOL: 1
ALCOHOL_USE: YES
CONSUMPTION TOTAL: POSITIVE
HAVE YOU EVER FELT YOU SHOULD CUT DOWN ON YOUR DRINKING: YES
EVER HAD A DRINK FIRST THING IN THE MORNING TO STEADY YOUR NERVES TO GET RID OF A HANGOVER: NO
DOES PATIENT WANT TO TALK TO SOMEONE ABOUT QUITTING: NO
DOES PATIENT WANT TO STOP DRINKING: YES
EVER FELT BAD OR GUILTY ABOUT YOUR DRINKING: YES
TOTAL SCORE: 2

## 2023-02-04 ASSESSMENT — PAIN DESCRIPTION - PAIN TYPE
TYPE: SURGICAL PAIN
TYPE: ACUTE PAIN
TYPE: ACUTE PAIN

## 2023-02-04 ASSESSMENT — COGNITIVE AND FUNCTIONAL STATUS - GENERAL
HELP NEEDED FOR BATHING: A LITTLE
TURNING FROM BACK TO SIDE WHILE IN FLAT BAD: A LITTLE
SUGGESTED CMS G CODE MODIFIER MOBILITY: CK
STANDING UP FROM CHAIR USING ARMS: A LITTLE
TOILETING: A LITTLE
WALKING IN HOSPITAL ROOM: A LITTLE
MOVING FROM LYING ON BACK TO SITTING ON SIDE OF FLAT BED: A LITTLE
DAILY ACTIVITIY SCORE: 21
MOBILITY SCORE: 19
DRESSING REGULAR LOWER BODY CLOTHING: A LITTLE
CLIMB 3 TO 5 STEPS WITH RAILING: A LITTLE
SUGGESTED CMS G CODE MODIFIER DAILY ACTIVITY: CJ

## 2023-02-04 ASSESSMENT — ENCOUNTER SYMPTOMS
SHORTNESS OF BREATH: 0
COUGH: 0
FEVER: 0
CHILLS: 0

## 2023-02-04 ASSESSMENT — FIBROSIS 4 INDEX
FIB4 SCORE: 1.22
FIB4 SCORE: 1.03

## 2023-02-04 ASSESSMENT — PAIN SCALES - GENERAL: PAIN_LEVEL: 0

## 2023-02-04 NOTE — ASSESSMENT & PLAN NOTE
Patient blood pressure noted to be uncontrolled, continue lisinopril 20 mg p.o. twice daily, Lasix 20 mg p.o. twice daily, Coreg 12.5 mg p.o. twice daily, amlodipine 10 mg   pRN clonidine with SBP greater than 160 po qd

## 2023-02-04 NOTE — ASSESSMENT & PLAN NOTE
Patient counseled on cessation  Also counseled on cessation of cocaine  Continue po thiamine and folic acid

## 2023-02-04 NOTE — ASSESSMENT & PLAN NOTE
Body mass index is 40.17 kg/m².   Life style modification incluindg  diet exercise and weight loss as an outpatient

## 2023-02-04 NOTE — ANESTHESIA TIME REPORT
Anesthesia Start and Stop Event Times     Date Time Event    2/4/2023 0859 Ready for Procedure     0904 Anesthesia Start     1014 Anesthesia Stop        Responsible Staff  02/04/23    Name Role Begin End    Gerald Deutsch M.D. Anesth 0904 1014        Overtime Reason:  no overtime (within assigned shift)    Comments:

## 2023-02-04 NOTE — ANESTHESIA PREPROCEDURE EVALUATION
" Case: [id number] Date/Time: 02/04/23 0845    Procedure: ORIF, ANKLE (Left)    Location: [facility] OR 09 / SURGERY [facility]    Surgeons: Jorge Persaud M.D.      Left ankle fracture.     CHF with EF of 30% last year. Patient reports extreme dyspnea at that time with \"fluid in my lungs\". Reports feeling significantly better now. Has had irregular follow up. Denies angina, dyspnea. No syncope.  Able to breathe and be active much better now than at that time.     NPO aside from fluids since 7pm. No fluids since 4am.         Relevant Problems   CARDIAC   (positive) Hypertension       Physical Exam    Airway   Mallampati: II  TM distance: >3 FB  Neck ROM: full       Cardiovascular - normal exam  Rhythm: regular  Rate: normal  (-) murmur     Dental - normal exam           Pulmonary - normal exam  Breath sounds clear to auscultation     Abdominal    Neurological - normal exam                 Anesthesia Plan    ASA 4- EMERGENT   ASA physical status 4 criteria: severe reduction of ejection fractionsASA physical status emergent criteria: displaced fracture with possible neurovascular compromise    Plan - general       Airway plan will be LMA          Induction: intravenous    Postoperative Plan: Postoperative administration of opioids is intended.    Pertinent diagnostic labs and testing reviewed    Informed Consent:    Anesthetic plan and risks discussed with patient.    Use of blood products discussed with: patient whom consented to blood products.         "

## 2023-02-04 NOTE — PROGRESS NOTES
Med rec completed per discussion with the patient and [facility]  NKDA confirmed  Patient reports he is no longer taking a baby aspirin  No abxs in the past 14 days
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
## 2023-02-04 NOTE — OR NURSING
Pt is aaox4, resting comfortably in hospital bed on 4lpm nasal cannula. His respirations are equal and unlabored, he is in no acute distress. He informs his pain is tolerable at this time. He takes sips of water and does not complain of n/v. Surgical site is elevated on pillow and walking boot in place. Will continue to monitor.
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
## 2023-02-04 NOTE — ED NOTES
Medicated patient per MAR for 8/10 pain, now 1/10. Patient did allow RN to remove clothing. Patient in hospital gown.   MD at bedside.   Patient transported to preop with chart and belongings on 2 L NC.

## 2023-02-04 NOTE — OR NURSING
Pt unsure when he last ate. He believes he last ate at 1900 2/4 but was drinking last night. First ED note at 0430 and no fluids since then.     Updated Dr. Deutsch.

## 2023-02-04 NOTE — ED NOTES
Report received from LUIS Pal. Assumed care of patient. Pt assessed, AAO x 4 . Patient's concerns addressed. Patient aware of POC. Fall precautions in place. Call light within reach. This RN masked and in appropriate PPE during encounter.     Patient refusing to have pants cut off.

## 2023-02-04 NOTE — PROGRESS NOTES
Pt is transported up to floor by [facility] transporter on 4 lpm on a full tank of O2. The pt is completely awake and in no acute distress prior to leaving the PACU. Pain is at a tolerable level and are not exhibiting any n/v.     Handoff report given to Viviane SMITH on the receiving unit and are aware of pt arrival.

## 2023-02-04 NOTE — ASSESSMENT & PLAN NOTE
No acute exacerbation  Echo showed EF of 40% with grade 1 diastolic dysfunction,  Continue Lasix, I/os, daily weight, fluid restriction 1.5 L, cardiac diet  Continue lisinopril 20 mg p.o. twice daily, Coreg 12.5 mg p.o. twice daily, Aldactone 25 mg p.o. twice daily, statin  Need cardiology  follow up as an op

## 2023-02-04 NOTE — ANESTHESIA POSTPROCEDURE EVALUATION
Patient: Gage Garcia    Procedure Summary     Date: 02/04/23 Room / Location: [place] [address] / SURGERY [facility]    Anesthesia Start: 0904 Anesthesia Stop: 1014    Procedure: ORIF, ANKLE (Left: Ankle) Diagnosis: (open ankle fracture dislocation)    Surgeons: Jorge Persaud M.D. Responsible Provider: Gerald Deutsch M.D.    Anesthesia Type: general ASA Status: 4 - Emergent          Final Anesthesia Type: general  Last vitals  BP   Blood Pressure: (!) 182/104    Temp   36.4 °C (97.5 °F)    Pulse   (!) 103   Resp   17    SpO2   99 %      Anesthesia Post Evaluation    Patient location during evaluation: PACU  Patient participation: complete - patient participated  Level of consciousness: awake and alert  Pain score: 0    Airway patency: patent  Anesthetic complications: no  Cardiovascular status: hemodynamically stable  Respiratory status: acceptable  Hydration status: euvolemic    PONV: none          No notable events documented.     Nurse Pain Score: 1 (NPRS)

## 2023-02-04 NOTE — ED NOTES
Left ankle reduction performed at bedside by Dr. Valdez with Dr. Valdez directing conscious sedation. Consent signed and on chart. Medications titrated by ERP per MAR. Procedure began at 0753. Pt tolerated well, maintained airway independently and vital signs remained stable. Resuscitation equipment available but was unneeded. RN, RT, and MD present throughout procedure.

## 2023-02-04 NOTE — ANESTHESIA PROCEDURE NOTES
Airway    Date/Time: 2/4/2023 9:11 AM  Performed by: Gerald Deutsch M.D.  Authorized by: Gerald Deutsch M.D.     Location:  OR  Urgency:  Elective  Indications for Airway Management:  Anesthesia      Spontaneous Ventilation: absent    Sedation Level:  Deep  Preoxygenated: Yes    Mask Difficulty Assessment:  0 - not attempted  Final Airway Type:  Supraglottic airway  Final Supraglottic Airway:  Standard LMA    SGA Size:  5  Number of Attempts at Approach:  1

## 2023-02-04 NOTE — CONSULTS
"2/4/2023    Time Called: 6:45  Time Arrived: 6:48    The patient was seen at the request of Dr Santiago    HPI: Gage Garcia is a 47 y.o. male who presents with complaints of pain to left ankle.  This started today after fall.  The pain is 8/10 and is described as sharp.  The pain is made worse by palpation of the area and made better by rest and immobilization.    Past Medical History:   Diagnosis Date    Hypertension     Obesity     Smoker     Strep pharyngitis        No past surgical history on file.    Medications  No current facility-administered medications on file prior to encounter.     Current Outpatient Medications on File Prior to Encounter   Medication Sig Dispense Refill    aspirin (ASA) 81 MG Chew Tab chewable tablet Chew 81 mg every day.      furosemide (LASIX) 20 MG Tab Take 1 Tablet by mouth 2 times a day. 60 Tablet 11    lisinopril (PRINIVIL) 20 MG Tab Take 1 Tablet by mouth every day. 30 Tablet 11    spironolactone (ALDACTONE) 25 MG Tab Take 1 Tablet by mouth every day. 30 Tablet 11       Allergies  Patient has no known allergies.    ROS  Left ankle pain. All other systems were reviewed and found to be negative    Family History   Problem Relation Age of Onset    Heart Disease Mother     Diabetes Mother     Cancer Mother        Social History     Socioeconomic History    Marital status: Single   Tobacco Use    Smoking status: Every Day     Packs/day: 0.50     Types: Cigarettes    Smokeless tobacco: Never   Vaping Use    Vaping Use: Never used   Substance and Sexual Activity    Alcohol use: Not Currently     Comment: occ    Drug use: Yes     Types: Inhaled, Marijuana     Comment: thc    Sexual activity: Yes       Physical Exam  Vitals  BP (!) 182/104   Pulse (!) 103   Temp 36.4 °C (97.5 °F) (Temporal)   Resp 17   Ht 1.93 m (6' 4\")   Wt (!) 150 kg (330 lb)   SpO2 99%   General: Well Developed, Well Nourished, Age appropriate appearance  HEENT: Normocephalic, atraumatic  Psych: Normal mood and " affect  Neck: Supple, nontender, no masses  Lungs: Breathing unlabored, No audible wheezing  Heart: Regular heart rate and rhythm  Abdomen: Soft, NT, ND  Neuro: Sensation grossly intact to BUE and BLE, moving all four extremities  Skin: 8cm open medial wound   Vascular: 2+DP/PT, Capillary refill <2 seconds  MSK: Left open ankle fracture with exposed tibia      Radiographs:  DX-FOOT-2- LEFT   Final Result         1.  Comminuted fibular metaphyseal fracture and medial malleolus fracture with lateral dislocation of the ankle mortise joint.   2.  Soft tissue gas in the medial ankle, compatible with open fracture.      DX-CHEST-PORTABLE (1 VIEW)   Final Result         1.  No acute cardiopulmonary disease.      DX-ANKLE 3+ VIEWS LEFT   Final Result         1.  Comminuted fibular metaphyseal fracture and medial malleolus fracture with lateral dislocation of the ankle mortise joint.   2.  Soft tissue gas in the medial ankle, compatible with open fracture.      DX-TIBIA AND FIBULA LEFT   Final Result         1.  Comminuted fibular metaphyseal fracture and medial malleolus fracture with lateral dislocation of the ankle mortise joint.   2.  Soft tissue gas in the medial ankle, compatible with open fracture.   3.  Corticated bony fragment near the insertion of the patellar tendon, could be related to prior Osgood Schlatter.      DX-ANKLE 2- VIEWS LEFT    (Results Pending)   DX-PORTABLE FLUORO > 1 HOUR    (Results Pending)       Laboratory Values  Recent Labs     02/04/23  0448   WBC 9.5   RBC 5.32   HEMOGLOBIN 15.0   HEMATOCRIT 45.2   MCV 85.0   MCH 28.2   MCHC 33.2*   RDW 42.3   PLATELETCT 176   MPV 10.1     Recent Labs     02/04/23  0448   SODIUM 144   POTASSIUM 3.4*   CHLORIDE 106   CO2 25   GLUCOSE 102*   BUN 24*     Recent Labs     02/04/23  0448   INR 0.95         Impression:Left type III open ankle fracture dislocation    Plan:We discussed the diagnosis and findings with the patient at length.  We reviewed possible non  operative and operative interventions and the risks and benefits of each of these.  he had a chance to ask questions and all of these were answered to his satisfaction. The patient chose to proceed with  operative intervention. Risks and benefits of surgery were discussed which include but are not limited to bleeding, infection, neurovascular damage, malunion, nonunion, instability, limb length discrepancy, DVT, PE, MI, Stroke and death. They understand these risks and wish to proceed.

## 2023-02-04 NOTE — ED NOTES
PT IS SLEEPING. BREATHING IS EVEN AND UNLABORED, SKIN IS WARM AND DRY, VSS, NAD, WILL CONTINUE TO MONITOR.
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
## 2023-02-04 NOTE — ASSESSMENT & PLAN NOTE
Status post reduction by ED physician  Dr. Persaud consulted and he underwent  Open treatment of left lateral malleolus ankle fracture with internal fixation; Open repair left syndesmosis with internal fixation;  Irrigation and debridement open fracture with Dr. Zavala on 2/4/2023.  Per orthopedic surgery, patient should be toe-touch weight bearing on the operative extremity.     -- PT/OT evaluate the patient, recommend postacute, SNF referral has been placed  Multimodal pain management, DVT prophylaxis.  At this time patient medically stable to be discharged to skilled nursing facility

## 2023-02-05 ENCOUNTER — APPOINTMENT (OUTPATIENT)
Dept: CARDIOLOGY | Facility: MEDICAL CENTER | Age: 48
DRG: 493 | End: 2023-02-05
Attending: HOSPITALIST
Payer: COMMERCIAL

## 2023-02-05 PROBLEM — Z71.89 ADVANCE CARE PLANNING: Status: ACTIVE | Noted: 2023-02-05

## 2023-02-05 PROBLEM — F17.200 SMOKER: Status: RESOLVED | Noted: 2022-07-26 | Resolved: 2023-02-05

## 2023-02-05 PROBLEM — F17.210 DEPENDENCE ON NICOTINE FROM CIGARETTES: Status: ACTIVE | Noted: 2023-02-05

## 2023-02-05 LAB
ANION GAP SERPL CALC-SCNC: 9 MMOL/L (ref 7–16)
BASOPHILS # BLD AUTO: 0.3 % (ref 0–1.8)
BASOPHILS # BLD: 0.03 K/UL (ref 0–0.12)
BUN SERPL-MCNC: 23 MG/DL (ref 8–22)
CALCIUM SERPL-MCNC: 8.4 MG/DL (ref 8.5–10.5)
CHLORIDE SERPL-SCNC: 104 MMOL/L (ref 96–112)
CO2 SERPL-SCNC: 25 MMOL/L (ref 20–33)
CREAT SERPL-MCNC: 1.16 MG/DL (ref 0.5–1.4)
EOSINOPHIL # BLD AUTO: 0.02 K/UL (ref 0–0.51)
EOSINOPHIL NFR BLD: 0.2 % (ref 0–6.9)
ERYTHROCYTE [DISTWIDTH] IN BLOOD BY AUTOMATED COUNT: 42.9 FL (ref 35.9–50)
GFR SERPLBLD CREATININE-BSD FMLA CKD-EPI: 78 ML/MIN/1.73 M 2
GLUCOSE SERPL-MCNC: 111 MG/DL (ref 65–99)
HCT VFR BLD AUTO: 42.3 % (ref 42–52)
HGB BLD-MCNC: 14.1 G/DL (ref 14–18)
IMM GRANULOCYTES # BLD AUTO: 0.08 K/UL (ref 0–0.11)
IMM GRANULOCYTES NFR BLD AUTO: 0.7 % (ref 0–0.9)
LV EJECT FRACT  99904: 40
LYMPHOCYTES # BLD AUTO: 1.86 K/UL (ref 1–4.8)
LYMPHOCYTES NFR BLD: 16 % (ref 22–41)
MAGNESIUM SERPL-MCNC: 2.3 MG/DL (ref 1.5–2.5)
MCH RBC QN AUTO: 28.2 PG (ref 27–33)
MCHC RBC AUTO-ENTMCNC: 33.3 G/DL (ref 33.7–35.3)
MCV RBC AUTO: 84.6 FL (ref 81.4–97.8)
MONOCYTES # BLD AUTO: 1.22 K/UL (ref 0–0.85)
MONOCYTES NFR BLD AUTO: 10.5 % (ref 0–13.4)
NEUTROPHILS # BLD AUTO: 8.43 K/UL (ref 1.82–7.42)
NEUTROPHILS NFR BLD: 72.3 % (ref 44–72)
NRBC # BLD AUTO: 0 K/UL
NRBC BLD-RTO: 0 /100 WBC
PLATELET # BLD AUTO: 177 K/UL (ref 164–446)
PMV BLD AUTO: 9.9 FL (ref 9–12.9)
POTASSIUM SERPL-SCNC: 4.5 MMOL/L (ref 3.6–5.5)
RBC # BLD AUTO: 5 M/UL (ref 4.7–6.1)
SODIUM SERPL-SCNC: 138 MMOL/L (ref 135–145)
WBC # BLD AUTO: 11.6 K/UL (ref 4.8–10.8)

## 2023-02-05 PROCEDURE — 83735 ASSAY OF MAGNESIUM: CPT

## 2023-02-05 PROCEDURE — 700105 HCHG RX REV CODE 258: Performed by: HOSPITALIST

## 2023-02-05 PROCEDURE — 97535 SELF CARE MNGMENT TRAINING: CPT

## 2023-02-05 PROCEDURE — 97166 OT EVAL MOD COMPLEX 45 MIN: CPT

## 2023-02-05 PROCEDURE — A9270 NON-COVERED ITEM OR SERVICE: HCPCS | Performed by: HOSPITALIST

## 2023-02-05 PROCEDURE — 99024 POSTOP FOLLOW-UP VISIT: CPT | Performed by: ORTHOPAEDIC SURGERY

## 2023-02-05 PROCEDURE — 700102 HCHG RX REV CODE 250 W/ 637 OVERRIDE(OP): Performed by: HOSPITALIST

## 2023-02-05 PROCEDURE — 99232 SBSQ HOSP IP/OBS MODERATE 35: CPT | Mod: 25 | Performed by: HOSPITALIST

## 2023-02-05 PROCEDURE — 700111 HCHG RX REV CODE 636 W/ 250 OVERRIDE (IP): Performed by: HOSPITALIST

## 2023-02-05 PROCEDURE — 97162 PT EVAL MOD COMPLEX 30 MIN: CPT

## 2023-02-05 PROCEDURE — A9270 NON-COVERED ITEM OR SERVICE: HCPCS

## 2023-02-05 PROCEDURE — 85025 COMPLETE CBC W/AUTO DIFF WBC: CPT

## 2023-02-05 PROCEDURE — 36415 COLL VENOUS BLD VENIPUNCTURE: CPT

## 2023-02-05 PROCEDURE — 93306 TTE W/DOPPLER COMPLETE: CPT

## 2023-02-05 PROCEDURE — 93306 TTE W/DOPPLER COMPLETE: CPT | Mod: 26 | Performed by: INTERNAL MEDICINE

## 2023-02-05 PROCEDURE — 99406 BEHAV CHNG SMOKING 3-10 MIN: CPT | Performed by: HOSPITALIST

## 2023-02-05 PROCEDURE — 99497 ADVNCD CARE PLAN 30 MIN: CPT | Mod: 25 | Performed by: HOSPITALIST

## 2023-02-05 PROCEDURE — 700102 HCHG RX REV CODE 250 W/ 637 OVERRIDE(OP)

## 2023-02-05 PROCEDURE — 80048 BASIC METABOLIC PNL TOTAL CA: CPT

## 2023-02-05 PROCEDURE — 770001 HCHG ROOM/CARE - MED/SURG/GYN PRIV*

## 2023-02-05 RX ORDER — AMLODIPINE BESYLATE 5 MG/1
5 TABLET ORAL
Status: DISCONTINUED | OUTPATIENT
Start: 2023-02-05 | End: 2023-02-06

## 2023-02-05 RX ORDER — CLONIDINE HYDROCHLORIDE 0.1 MG/1
0.1 TABLET ORAL 2 TIMES DAILY PRN
Status: DISCONTINUED | OUTPATIENT
Start: 2023-02-05 | End: 2023-02-09 | Stop reason: HOSPADM

## 2023-02-05 RX ORDER — LISINOPRIL 20 MG/1
20 TABLET ORAL 2 TIMES DAILY
Status: DISCONTINUED | OUTPATIENT
Start: 2023-02-05 | End: 2023-02-09 | Stop reason: HOSPADM

## 2023-02-05 RX ORDER — GABAPENTIN 300 MG/1
300 CAPSULE ORAL 3 TIMES DAILY
Status: DISCONTINUED | OUTPATIENT
Start: 2023-02-05 | End: 2023-02-09 | Stop reason: HOSPADM

## 2023-02-05 RX ORDER — METHOCARBAMOL 750 MG/1
750 TABLET, FILM COATED ORAL 3 TIMES DAILY PRN
Status: DISCONTINUED | OUTPATIENT
Start: 2023-02-05 | End: 2023-02-09 | Stop reason: HOSPADM

## 2023-02-05 RX ORDER — FUROSEMIDE 20 MG/1
20 TABLET ORAL
Status: DISCONTINUED | OUTPATIENT
Start: 2023-02-05 | End: 2023-02-09 | Stop reason: HOSPADM

## 2023-02-05 RX ORDER — AMLODIPINE BESYLATE 5 MG/1
5 TABLET ORAL
Status: DISCONTINUED | OUTPATIENT
Start: 2023-02-06 | End: 2023-02-05

## 2023-02-05 RX ADMIN — CEFAZOLIN 2 G: 2 INJECTION, POWDER, FOR SOLUTION INTRAMUSCULAR; INTRAVENOUS at 02:02

## 2023-02-05 RX ADMIN — LABETALOL HYDROCHLORIDE 20 MG: 5 INJECTION, SOLUTION INTRAVENOUS at 16:56

## 2023-02-05 RX ADMIN — OXYCODONE HYDROCHLORIDE 10 MG: 10 TABLET ORAL at 11:50

## 2023-02-05 RX ADMIN — ACETAMINOPHEN 650 MG: 325 TABLET, FILM COATED ORAL at 19:20

## 2023-02-05 RX ADMIN — HYDROMORPHONE HYDROCHLORIDE 0.5 MG: 1 INJECTION, SOLUTION INTRAMUSCULAR; INTRAVENOUS; SUBCUTANEOUS at 05:06

## 2023-02-05 RX ADMIN — CARVEDILOL 3.12 MG: 3.12 TABLET, FILM COATED ORAL at 16:56

## 2023-02-05 RX ADMIN — CARVEDILOL 3.12 MG: 3.12 TABLET, FILM COATED ORAL at 08:14

## 2023-02-05 RX ADMIN — LISINOPRIL 20 MG: 20 TABLET ORAL at 17:11

## 2023-02-05 RX ADMIN — LISINOPRIL 20 MG: 20 TABLET ORAL at 05:19

## 2023-02-05 RX ADMIN — CEFAZOLIN 2 G: 2 INJECTION, POWDER, FOR SOLUTION INTRAMUSCULAR; INTRAVENOUS at 08:18

## 2023-02-05 RX ADMIN — GABAPENTIN 300 MG: 300 CAPSULE ORAL at 16:56

## 2023-02-05 RX ADMIN — AMLODIPINE BESYLATE 5 MG: 5 TABLET ORAL at 17:56

## 2023-02-05 RX ADMIN — FUROSEMIDE 20 MG: 20 TABLET ORAL at 16:56

## 2023-02-05 RX ADMIN — ATORVASTATIN CALCIUM 10 MG: 10 TABLET, FILM COATED ORAL at 19:20

## 2023-02-05 RX ADMIN — GABAPENTIN 300 MG: 300 CAPSULE ORAL at 12:50

## 2023-02-05 RX ADMIN — CEFAZOLIN 2 G: 2 INJECTION, POWDER, FOR SOLUTION INTRAMUSCULAR; INTRAVENOUS at 17:04

## 2023-02-05 RX ADMIN — OXYCODONE HYDROCHLORIDE 10 MG: 10 TABLET ORAL at 08:16

## 2023-02-05 RX ADMIN — LABETALOL HYDROCHLORIDE 20 MG: 5 INJECTION, SOLUTION INTRAVENOUS at 05:05

## 2023-02-05 RX ADMIN — ASPIRIN 81 MG: 81 TABLET, CHEWABLE ORAL at 05:19

## 2023-02-05 RX ADMIN — SPIRONOLACTONE 25 MG: 25 TABLET ORAL at 05:20

## 2023-02-05 RX ADMIN — FUROSEMIDE 20 MG: 20 TABLET ORAL at 05:33

## 2023-02-05 RX ADMIN — OXYCODONE HYDROCHLORIDE 10 MG: 10 TABLET ORAL at 17:11

## 2023-02-05 ASSESSMENT — ENCOUNTER SYMPTOMS
DIARRHEA: 0
BLURRED VISION: 0
SHORTNESS OF BREATH: 1
MYALGIAS: 1
HEADACHES: 0
SORE THROAT: 0
FOCAL WEAKNESS: 0
PALPITATIONS: 0
COUGH: 0
HEARTBURN: 0
NAUSEA: 0
CONSTIPATION: 1
NERVOUS/ANXIOUS: 1
VOMITING: 0

## 2023-02-05 ASSESSMENT — COGNITIVE AND FUNCTIONAL STATUS - GENERAL
MOBILITY SCORE: 11
DRESSING REGULAR LOWER BODY CLOTHING: A LOT
HELP NEEDED FOR BATHING: A LOT
TOILETING: A LOT
WALKING IN HOSPITAL ROOM: A LOT
MOVING TO AND FROM BED TO CHAIR: A LOT
MOVING FROM LYING ON BACK TO SITTING ON SIDE OF FLAT BED: UNABLE
TURNING FROM BACK TO SIDE WHILE IN FLAT BAD: A LOT
SUGGESTED CMS G CODE MODIFIER MOBILITY: CL
STANDING UP FROM CHAIR USING ARMS: A LOT
DAILY ACTIVITIY SCORE: 18
SUGGESTED CMS G CODE MODIFIER DAILY ACTIVITY: CK
CLIMB 3 TO 5 STEPS WITH RAILING: A LOT

## 2023-02-05 ASSESSMENT — PAIN DESCRIPTION - PAIN TYPE
TYPE: SURGICAL PAIN
TYPE: SURGICAL PAIN
TYPE: ACUTE PAIN;SURGICAL PAIN

## 2023-02-05 ASSESSMENT — PATIENT HEALTH QUESTIONNAIRE - PHQ9
SUM OF ALL RESPONSES TO PHQ9 QUESTIONS 1 AND 2: 0
1. LITTLE INTEREST OR PLEASURE IN DOING THINGS: NOT AT ALL

## 2023-02-05 ASSESSMENT — ACTIVITIES OF DAILY LIVING (ADL): TOILETING: INDEPENDENT

## 2023-02-05 ASSESSMENT — GAIT ASSESSMENTS: GAIT LEVEL OF ASSIST: UNABLE TO PARTICIPATE

## 2023-02-05 NOTE — CARE PLAN
The patient is {Patient Stability:[id number]}    Shift Goals  Clinical Goals: Rest, heal, pain control  Patient Goals: Sleep , pain control    Progress made toward(s) clinical / shift goals:  ***    Patient is not progressing towards the following goals:      Problem: Pain - Standard  Goal: Alleviation of pain or a reduction in pain to the patient’s comfort goal  Outcome: Not Met  Note: Pt controlled with meds     Problem: Early Mobilization - Post Surgery  Goal: Early mobilization post surgery  Outcome: Not Met

## 2023-02-05 NOTE — PROGRESS NOTES
2 RN skin check done w/LUIS Pace.   Sx site to LLE w/dressing in places-CDI.   Redness to bridge of nose-slow to gerardo (picture uploaded).   Tear to left check.  Tear to knuckles on left hand.   Patches of discoloration to BUE, BLE, trunk, & sacrum.   No other skin breakdown noted.
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
## 2023-02-05 NOTE — ASSESSMENT & PLAN NOTE
Patient was of sound mind and voluntarily participated in the conversation.  Patient  were present for the conversation.  I discussed advance care planning face to face with the patient for at least 20 minutes, including diagnosis, prognosis, plan of care, risks and benefits of any therapies that could be offered, as well as alternatives including palliation .    Wanted to remain full code

## 2023-02-05 NOTE — ASSESSMENT & PLAN NOTE
Patient does smoke half pack of cigarettes a day, adverse effect of smoking cigarette has been discussed including COPD, CVA, lung cancer, CAD, history understanding.  He does not want nicotine patch and wanted to postacute.  3 minutes were spent in counseling

## 2023-02-05 NOTE — CARE PLAN
The patient is Watcher - Medium risk of patient condition declining or worsening    Shift Goals  Clinical Goals: pain control, BP pressure, PT/OT  Patient Goals: pain control, mobility  Family Goals: not present    Progress made toward(s) clinical / shift goals:  yes    Problem: Pain - Standard  Goal: Alleviation of pain or a reduction in pain to the patient’s comfort goal  Outcome: Progressing     Problem: Knowledge Deficit - Standard  Goal: Patient and family/care givers will demonstrate understanding of plan of care, disease process/condition, diagnostic tests and medications  Outcome: Progressing     Problem: Fall Risk  Goal: Patient will remain free from falls  Outcome: Progressing     Problem: Neuro Status  Goal: Neuro status will remain stable or improve  Outcome: Progressing     Problem: Respiratory/Oxygenation Function Post-Surgical  Goal: Patient will achieve/maintain normal respiratory rate/effort  Outcome: Progressing     Problem: Early Mobilization - Post Surgery  Goal: Early mobilization post surgery  Outcome: Progressing     Problem: Wound/ / Incision Healing  Goal: Patient's wound/surgical incision will decrease in size and heals properly  Outcome: Progressing

## 2023-02-05 NOTE — PROGRESS NOTES
MD aware of hypertension throughout shift. Pain was very high, patient concerned with not being able to move. He was also concerned about benji receiving his home med lasix. MD put in order for AM lasix which was given.Labetalol was given PRN with morning meds as well as dilaudid. Ice pack helping some. His knee is what is causing so much pain he states because its so swollen. Pt states he feels fluid over loaded due to CHF
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
## 2023-02-05 NOTE — CARE PLAN
The patient is Stable - Low risk of patient condition declining or worsening    Shift Goals  Clinical Goals: Rest, heal, pain control  Patient Goals: Sleep , pain control    Progress made toward(s) clinical / shift goals:    Problem: Early Mobilization - Post Surgery  Goal: Early mobilization post surgery  2/5/2023 0142 by Katt Chery R.N.  Outcome: Not Met  2/5/2023 0141 by Katt Chery R.N.  Outcome: Not Met     Problem: Wound/ / Incision Healing  Goal: Patient's wound/surgical incision will decrease in size and heals properly  Outcome: Not Met     Problem: Pain - Standard  Goal: Alleviation of pain or a reduction in pain to the patient’s comfort goal  2/5/2023 0142 by MIRTHA Lopes.N.  Outcome: Progressing  2/5/2023 0139 by Ktat Chery R.N.  Outcome: Not Met  Note: Pt controlled with meds     Problem: Knowledge Deficit - Standard  Goal: Patient and family/care givers will demonstrate understanding of plan of care, disease process/condition, diagnostic tests and medications  2/5/2023 0142 by Katt Chery R.N.  Outcome: Progressing  2/5/2023 0139 by Katt Chery R.N.  Outcome: Progressing     Problem: Fall Risk  Goal: Patient will remain free from falls  2/5/2023 0142 by Katt Chery R.N.  Outcome: Progressing  2/5/2023 0139 by Katt Chery R.N.  Outcome: Progressing     Problem: Neuro Status  Goal: Neuro status will remain stable or improve  2/5/2023 0142 by Katt Chery R.N.  Outcome: Progressing  2/5/2023 0141 by Katt Chrey R.N.  Note: Pt alert x4     Problem: Respiratory/Oxygenation Function Post-Surgical  Goal: Patient will achieve/maintain normal respiratory rate/effort  2/5/2023 0142 by Katt Chery R.N.  Outcome: Progressing  2/5/2023 0141 by Katt Chery R.N.  Note: Pt now on room air at 90% 02 SAT       Patient is not progressing towards the following goals:      Problem: Pain - Standard  Goal: Alleviation of pain or  a reduction in pain to the patient’s comfort goal  2/5/2023 0142 by Katt Chery R.N.  Outcome: Progressing  2/5/2023 0139 by Katt Chery R.N.  Outcome: Not Met  Note: Pt controlled with meds     Problem: Early Mobilization - Post Surgery  Goal: Early mobilization post surgery  2/5/2023 0142 by Katt Chery, R.N.  Outcome: Not Met  2/5/2023 0141 by Katt Chery, R.N.  Outcome: Not Met     Problem: Wound/ / Incision Healing  Goal: Patient's wound/surgical incision will decrease in size and heals properly  Outcome: Not Met

## 2023-02-05 NOTE — DIETARY
NUTRITION SERVICES: BMI - Pt with BMI >40 (=Body mass index is 40.17 kg/m².), Class III obesity. Weight loss counseling not appropriate in acute care setting. RECOMMEND - If appropriate at DC please refer to outpatient nutrition services for weight management.
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
## 2023-02-06 ENCOUNTER — APPOINTMENT (OUTPATIENT)
Dept: RADIOLOGY | Facility: MEDICAL CENTER | Age: 48
DRG: 493 | End: 2023-02-06
Attending: HOSPITALIST
Payer: COMMERCIAL

## 2023-02-06 PROBLEM — R50.9 FEVER: Status: ACTIVE | Noted: 2023-02-06

## 2023-02-06 LAB
ANION GAP SERPL CALC-SCNC: 8 MMOL/L (ref 7–16)
BUN SERPL-MCNC: 23 MG/DL (ref 8–22)
CALCIUM SERPL-MCNC: 8.3 MG/DL (ref 8.5–10.5)
CHLORIDE SERPL-SCNC: 104 MMOL/L (ref 96–112)
CO2 SERPL-SCNC: 24 MMOL/L (ref 20–33)
CREAT SERPL-MCNC: 1.16 MG/DL (ref 0.5–1.4)
ERYTHROCYTE [DISTWIDTH] IN BLOOD BY AUTOMATED COUNT: 43.4 FL (ref 35.9–50)
FLUAV RNA SPEC QL NAA+PROBE: NEGATIVE
FLUBV RNA SPEC QL NAA+PROBE: NEGATIVE
GFR SERPLBLD CREATININE-BSD FMLA CKD-EPI: 78 ML/MIN/1.73 M 2
GLUCOSE SERPL-MCNC: 97 MG/DL (ref 65–99)
HCT VFR BLD AUTO: 43.2 % (ref 42–52)
HGB BLD-MCNC: 14.5 G/DL (ref 14–18)
MAGNESIUM SERPL-MCNC: 2 MG/DL (ref 1.5–2.5)
MCH RBC QN AUTO: 28.3 PG (ref 27–33)
MCHC RBC AUTO-ENTMCNC: 33.6 G/DL (ref 33.7–35.3)
MCV RBC AUTO: 84.2 FL (ref 81.4–97.8)
PLATELET # BLD AUTO: 179 K/UL (ref 164–446)
PMV BLD AUTO: 10.5 FL (ref 9–12.9)
POTASSIUM SERPL-SCNC: 4.4 MMOL/L (ref 3.6–5.5)
PROCALCITONIN SERPL-MCNC: 0.14 NG/ML
RBC # BLD AUTO: 5.13 M/UL (ref 4.7–6.1)
RSV RNA SPEC QL NAA+PROBE: NEGATIVE
SARS-COV-2 RNA RESP QL NAA+PROBE: NOTDETECTED
SODIUM SERPL-SCNC: 136 MMOL/L (ref 135–145)
SPECIMEN SOURCE: NORMAL
WBC # BLD AUTO: 11.2 K/UL (ref 4.8–10.8)

## 2023-02-06 PROCEDURE — 700102 HCHG RX REV CODE 250 W/ 637 OVERRIDE(OP): Performed by: HOSPITALIST

## 2023-02-06 PROCEDURE — 80048 BASIC METABOLIC PNL TOTAL CA: CPT

## 2023-02-06 PROCEDURE — A9270 NON-COVERED ITEM OR SERVICE: HCPCS

## 2023-02-06 PROCEDURE — 700111 HCHG RX REV CODE 636 W/ 250 OVERRIDE (IP): Performed by: HOSPITALIST

## 2023-02-06 PROCEDURE — 84145 PROCALCITONIN (PCT): CPT

## 2023-02-06 PROCEDURE — 87040 BLOOD CULTURE FOR BACTERIA: CPT | Mod: 91

## 2023-02-06 PROCEDURE — 83735 ASSAY OF MAGNESIUM: CPT

## 2023-02-06 PROCEDURE — 99233 SBSQ HOSP IP/OBS HIGH 50: CPT | Performed by: HOSPITALIST

## 2023-02-06 PROCEDURE — 97530 THERAPEUTIC ACTIVITIES: CPT

## 2023-02-06 PROCEDURE — A9270 NON-COVERED ITEM OR SERVICE: HCPCS | Performed by: HOSPITALIST

## 2023-02-06 PROCEDURE — 71045 X-RAY EXAM CHEST 1 VIEW: CPT

## 2023-02-06 PROCEDURE — 700102 HCHG RX REV CODE 250 W/ 637 OVERRIDE(OP)

## 2023-02-06 PROCEDURE — 73560 X-RAY EXAM OF KNEE 1 OR 2: CPT | Mod: LT

## 2023-02-06 PROCEDURE — C9803 HOPD COVID-19 SPEC COLLECT: HCPCS | Performed by: HOSPITALIST

## 2023-02-06 PROCEDURE — 700105 HCHG RX REV CODE 258: Performed by: HOSPITALIST

## 2023-02-06 PROCEDURE — 85027 COMPLETE CBC AUTOMATED: CPT

## 2023-02-06 PROCEDURE — 770001 HCHG ROOM/CARE - MED/SURG/GYN PRIV*

## 2023-02-06 PROCEDURE — 36415 COLL VENOUS BLD VENIPUNCTURE: CPT

## 2023-02-06 PROCEDURE — 0241U HCHG SARS-COV-2 COVID-19 NFCT DS RESP RNA 4 TRGT MIC: CPT

## 2023-02-06 RX ORDER — PREDNISONE 20 MG/1
40 TABLET ORAL DAILY
Status: DISCONTINUED | OUTPATIENT
Start: 2023-02-06 | End: 2023-02-09 | Stop reason: HOSPADM

## 2023-02-06 RX ORDER — CARVEDILOL 6.25 MG/1
12.5 TABLET ORAL 2 TIMES DAILY WITH MEALS
Status: DISCONTINUED | OUTPATIENT
Start: 2023-02-06 | End: 2023-02-09 | Stop reason: HOSPADM

## 2023-02-06 RX ORDER — AMLODIPINE BESYLATE 5 MG/1
5 TABLET ORAL ONCE
Status: COMPLETED | OUTPATIENT
Start: 2023-02-06 | End: 2023-02-06

## 2023-02-06 RX ORDER — OXYCODONE HYDROCHLORIDE 5 MG/1
5 TABLET ORAL
Status: DISCONTINUED | OUTPATIENT
Start: 2023-02-06 | End: 2023-02-09 | Stop reason: HOSPADM

## 2023-02-06 RX ORDER — ISOSORBIDE MONONITRATE 30 MG/1
30 TABLET, EXTENDED RELEASE ORAL
Status: DISCONTINUED | OUTPATIENT
Start: 2023-02-06 | End: 2023-02-09 | Stop reason: HOSPADM

## 2023-02-06 RX ORDER — FUROSEMIDE 10 MG/ML
20 INJECTION INTRAMUSCULAR; INTRAVENOUS ONCE
Status: COMPLETED | OUTPATIENT
Start: 2023-02-06 | End: 2023-02-06

## 2023-02-06 RX ORDER — OXYCODONE HYDROCHLORIDE 10 MG/1
10 TABLET ORAL
Status: DISCONTINUED | OUTPATIENT
Start: 2023-02-06 | End: 2023-02-09 | Stop reason: HOSPADM

## 2023-02-06 RX ORDER — HYDROMORPHONE HYDROCHLORIDE 1 MG/ML
0.5 INJECTION, SOLUTION INTRAMUSCULAR; INTRAVENOUS; SUBCUTANEOUS 4 TIMES DAILY PRN
Status: DISCONTINUED | OUTPATIENT
Start: 2023-02-06 | End: 2023-02-09 | Stop reason: HOSPADM

## 2023-02-06 RX ORDER — AMLODIPINE BESYLATE 10 MG/1
10 TABLET ORAL
Status: DISCONTINUED | OUTPATIENT
Start: 2023-02-07 | End: 2023-02-09 | Stop reason: HOSPADM

## 2023-02-06 RX ADMIN — LABETALOL HYDROCHLORIDE 20 MG: 5 INJECTION, SOLUTION INTRAVENOUS at 23:58

## 2023-02-06 RX ADMIN — CEFAZOLIN 2 G: 2 INJECTION, POWDER, FOR SOLUTION INTRAMUSCULAR; INTRAVENOUS at 17:02

## 2023-02-06 RX ADMIN — OXYCODONE HYDROCHLORIDE 10 MG: 10 TABLET ORAL at 05:31

## 2023-02-06 RX ADMIN — CARVEDILOL 3.12 MG: 3.12 TABLET, FILM COATED ORAL at 08:31

## 2023-02-06 RX ADMIN — OXYCODONE HYDROCHLORIDE 10 MG: 10 TABLET ORAL at 00:26

## 2023-02-06 RX ADMIN — ASPIRIN 81 MG: 81 TABLET, CHEWABLE ORAL at 05:31

## 2023-02-06 RX ADMIN — AMLODIPINE BESYLATE 5 MG: 5 TABLET ORAL at 05:31

## 2023-02-06 RX ADMIN — FUROSEMIDE 20 MG: 10 INJECTION, SOLUTION INTRAMUSCULAR; INTRAVENOUS at 08:30

## 2023-02-06 RX ADMIN — PREDNISONE 40 MG: 20 TABLET ORAL at 21:19

## 2023-02-06 RX ADMIN — CARVEDILOL 12.5 MG: 6.25 TABLET, FILM COATED ORAL at 17:03

## 2023-02-06 RX ADMIN — GABAPENTIN 300 MG: 300 CAPSULE ORAL at 05:31

## 2023-02-06 RX ADMIN — CEFAZOLIN 2 G: 2 INJECTION, POWDER, FOR SOLUTION INTRAMUSCULAR; INTRAVENOUS at 08:38

## 2023-02-06 RX ADMIN — METHOCARBAMOL 750 MG: 750 TABLET ORAL at 14:33

## 2023-02-06 RX ADMIN — LISINOPRIL 20 MG: 20 TABLET ORAL at 17:02

## 2023-02-06 RX ADMIN — FUROSEMIDE 20 MG: 20 TABLET ORAL at 17:02

## 2023-02-06 RX ADMIN — AMLODIPINE BESYLATE 5 MG: 5 TABLET ORAL at 11:28

## 2023-02-06 RX ADMIN — CLONIDINE HYDROCHLORIDE 0.1 MG: 0.1 TABLET ORAL at 16:21

## 2023-02-06 RX ADMIN — OXYCODONE HYDROCHLORIDE 10 MG: 10 TABLET ORAL at 21:18

## 2023-02-06 RX ADMIN — SPIRONOLACTONE 25 MG: 25 TABLET ORAL at 05:31

## 2023-02-06 RX ADMIN — SENNOSIDES AND DOCUSATE SODIUM 2 TABLET: 50; 8.6 TABLET ORAL at 17:03

## 2023-02-06 RX ADMIN — ISOSORBIDE MONONITRATE 30 MG: 30 TABLET, EXTENDED RELEASE ORAL at 23:58

## 2023-02-06 RX ADMIN — OXYCODONE HYDROCHLORIDE 10 MG: 10 TABLET ORAL at 15:57

## 2023-02-06 RX ADMIN — ATORVASTATIN CALCIUM 10 MG: 10 TABLET, FILM COATED ORAL at 21:19

## 2023-02-06 RX ADMIN — GABAPENTIN 300 MG: 300 CAPSULE ORAL at 17:02

## 2023-02-06 RX ADMIN — GABAPENTIN 300 MG: 300 CAPSULE ORAL at 11:28

## 2023-02-06 RX ADMIN — METHOCARBAMOL 750 MG: 750 TABLET ORAL at 03:15

## 2023-02-06 RX ADMIN — LISINOPRIL 20 MG: 20 TABLET ORAL at 05:31

## 2023-02-06 RX ADMIN — FUROSEMIDE 20 MG: 20 TABLET ORAL at 05:31

## 2023-02-06 RX ADMIN — CEFAZOLIN 2 G: 2 INJECTION, POWDER, FOR SOLUTION INTRAMUSCULAR; INTRAVENOUS at 00:25

## 2023-02-06 ASSESSMENT — GAIT ASSESSMENTS
ASSISTIVE DEVICE: FRONT WHEEL WALKER
DEVIATION: ANTALGIC
DISTANCE (FEET): 1
GAIT LEVEL OF ASSIST: MINIMAL ASSIST

## 2023-02-06 ASSESSMENT — PAIN DESCRIPTION - PAIN TYPE
TYPE: ACUTE PAIN
TYPE: ACUTE PAIN;SURGICAL PAIN

## 2023-02-06 ASSESSMENT — COGNITIVE AND FUNCTIONAL STATUS - GENERAL
TURNING FROM BACK TO SIDE WHILE IN FLAT BAD: UNABLE
SUGGESTED CMS G CODE MODIFIER MOBILITY: CM
WALKING IN HOSPITAL ROOM: A LOT
MOBILITY SCORE: 8
MOVING TO AND FROM BED TO CHAIR: UNABLE
STANDING UP FROM CHAIR USING ARMS: A LOT
MOVING FROM LYING ON BACK TO SITTING ON SIDE OF FLAT BED: UNABLE
CLIMB 3 TO 5 STEPS WITH RAILING: TOTAL

## 2023-02-06 ASSESSMENT — ENCOUNTER SYMPTOMS
EYE PAIN: 0
NERVOUS/ANXIOUS: 1
HEARTBURN: 0
SORE THROAT: 0
COUGH: 0
SHORTNESS OF BREATH: 0
PALPITATIONS: 0
MYALGIAS: 1
HEADACHES: 0
DIARRHEA: 0
FOCAL WEAKNESS: 0
FEVER: 0
CONSTIPATION: 1

## 2023-02-06 NOTE — DISCHARGE PLANNING
Care Transition Team Assessment    Met with patient at bedside, discussed PLOF and confirmed demographics. Confirmed that he has [employer] insurance, ID NO: [id number], Group no: [id number], Tel: [phone]. Needs PM&R consult for possible IP rehab. CM will continue to monitor.     Information Source  Orientation Level: Oriented X4     Elopement Risk  Legal Hold: No  Ambulatory or Self Mobile in Wheelchair: No-Not an Elopement Risk  Elopement Risk: Not at Risk for Elopement    Interdisciplinary Discharge Planning  Lives with Alone and Able to Care For Self  Patient or legal guardian wants to designate a caregiver: No  Support Systems: Friends / Neighbors  Housing / Facility: 1 Story House  Able to Return to Previous ADL's: Future Time w/Therapy  Mobility Issues: Yes  Prior Services: None  Durable Medical Equipment: Not Applicable    Discharge Preparedness  What is your plan after discharge?: Uncertain - pending medical team collaboration  What are your discharge supports?:  (friend)  Prior Functional Level: Independent with Activities of Daily Living  Difficulity with ADLs: None  Difficulity with IADLs: None    Functional Assesment  Prior Functional Level: Independent with Activities of Daily Living    Vision / Hearing Impairment  Vision Impairment : No  Hearing Impairment : No    Domestic Abuse  Have you ever been the victim of abuse or violence?: No  Physical Abuse or Sexual Abuse: No  Verbal Abuse or Emotional Abuse: No  Possible Abuse/Neglect Reported to:: Not Applicable  Anticipated Discharge Information  Discharge Disposition: Disch to IP rehab facility or distinct part unit (62)
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
## 2023-02-06 NOTE — ASSESSMENT & PLAN NOTE
Unlikely underlying sepsis given normal white count, negative procalcitonin  Blood culture negative so far  Continue monitor for now

## 2023-02-06 NOTE — CARE PLAN
The patient is Stable - Low risk of patient condition declining or worsening    Shift Goals  Clinical Goals: Pain management, VSS, IV ABX  Patient Goals: Pain management, rest  Family Goals: not present      Problem: Pain - Standard  Goal: Alleviation of pain or a reduction in pain to the patient’s comfort goal  Outcome: Progressing     Problem: Knowledge Deficit - Standard  Goal: Patient and family/care givers will demonstrate understanding of plan of care, disease process/condition, diagnostic tests and medications  Outcome: Progressing     Problem: Fall Risk  Goal: Patient will remain free from falls  Outcome: Progressing     Problem: Neuro Status  Goal: Neuro status will remain stable or improve  Outcome: Progressing       Progress made toward(s) clinical / shift goals:  Patient updated on the plan of care. All questions answered. Pain assessed. Medicated per MAR. Fall precautions in place. Care ongoing.

## 2023-02-07 LAB
ALBUMIN SERPL BCP-MCNC: 3.7 G/DL (ref 3.2–4.9)
BUN SERPL-MCNC: 29 MG/DL (ref 8–22)
CALCIUM ALBUM COR SERPL-MCNC: 8.9 MG/DL (ref 8.5–10.5)
CALCIUM SERPL-MCNC: 8.7 MG/DL (ref 8.5–10.5)
CHLORIDE SERPL-SCNC: 102 MMOL/L (ref 96–112)
CO2 SERPL-SCNC: 27 MMOL/L (ref 20–33)
CREAT SERPL-MCNC: 1.22 MG/DL (ref 0.5–1.4)
ERYTHROCYTE [DISTWIDTH] IN BLOOD BY AUTOMATED COUNT: 42.6 FL (ref 35.9–50)
GFR SERPLBLD CREATININE-BSD FMLA CKD-EPI: 73 ML/MIN/1.73 M 2
GLUCOSE SERPL-MCNC: 133 MG/DL (ref 65–99)
HCT VFR BLD AUTO: 42.8 % (ref 42–52)
HGB BLD-MCNC: 14.6 G/DL (ref 14–18)
MCH RBC QN AUTO: 28.6 PG (ref 27–33)
MCHC RBC AUTO-ENTMCNC: 34.1 G/DL (ref 33.7–35.3)
MCV RBC AUTO: 83.9 FL (ref 81.4–97.8)
PHOSPHATE SERPL-MCNC: 2.9 MG/DL (ref 2.5–4.5)
PLATELET # BLD AUTO: 177 K/UL (ref 164–446)
PMV BLD AUTO: 10 FL (ref 9–12.9)
POTASSIUM SERPL-SCNC: 5.2 MMOL/L (ref 3.6–5.5)
RBC # BLD AUTO: 5.1 M/UL (ref 4.7–6.1)
SODIUM SERPL-SCNC: 137 MMOL/L (ref 135–145)
WBC # BLD AUTO: 10.8 K/UL (ref 4.8–10.8)

## 2023-02-07 PROCEDURE — A9270 NON-COVERED ITEM OR SERVICE: HCPCS | Performed by: HOSPITALIST

## 2023-02-07 PROCEDURE — 99232 SBSQ HOSP IP/OBS MODERATE 35: CPT | Performed by: STUDENT IN AN ORGANIZED HEALTH CARE EDUCATION/TRAINING PROGRAM

## 2023-02-07 PROCEDURE — 700102 HCHG RX REV CODE 250 W/ 637 OVERRIDE(OP): Performed by: HOSPITALIST

## 2023-02-07 PROCEDURE — 700102 HCHG RX REV CODE 250 W/ 637 OVERRIDE(OP)

## 2023-02-07 PROCEDURE — 700111 HCHG RX REV CODE 636 W/ 250 OVERRIDE (IP): Performed by: STUDENT IN AN ORGANIZED HEALTH CARE EDUCATION/TRAINING PROGRAM

## 2023-02-07 PROCEDURE — A9270 NON-COVERED ITEM OR SERVICE: HCPCS

## 2023-02-07 PROCEDURE — 700101 HCHG RX REV CODE 250: Performed by: HOSPITALIST

## 2023-02-07 PROCEDURE — 80069 RENAL FUNCTION PANEL: CPT

## 2023-02-07 PROCEDURE — 700111 HCHG RX REV CODE 636 W/ 250 OVERRIDE (IP): Performed by: HOSPITALIST

## 2023-02-07 PROCEDURE — 99024 POSTOP FOLLOW-UP VISIT: CPT | Performed by: ORTHOPAEDIC SURGERY

## 2023-02-07 PROCEDURE — 85027 COMPLETE CBC AUTOMATED: CPT

## 2023-02-07 PROCEDURE — 700105 HCHG RX REV CODE 258: Performed by: HOSPITALIST

## 2023-02-07 PROCEDURE — 770001 HCHG ROOM/CARE - MED/SURG/GYN PRIV*

## 2023-02-07 PROCEDURE — 36415 COLL VENOUS BLD VENIPUNCTURE: CPT

## 2023-02-07 RX ORDER — ENOXAPARIN SODIUM 100 MG/ML
40 INJECTION SUBCUTANEOUS DAILY
Status: DISCONTINUED | OUTPATIENT
Start: 2023-02-07 | End: 2023-02-09 | Stop reason: HOSPADM

## 2023-02-07 RX ADMIN — CEFAZOLIN 2 G: 2 INJECTION, POWDER, FOR SOLUTION INTRAMUSCULAR; INTRAVENOUS at 08:43

## 2023-02-07 RX ADMIN — ATORVASTATIN CALCIUM 10 MG: 10 TABLET, FILM COATED ORAL at 19:40

## 2023-02-07 RX ADMIN — FUROSEMIDE 20 MG: 20 TABLET ORAL at 05:25

## 2023-02-07 RX ADMIN — CARVEDILOL 12.5 MG: 6.25 TABLET, FILM COATED ORAL at 17:10

## 2023-02-07 RX ADMIN — OXYCODONE HYDROCHLORIDE 10 MG: 10 TABLET ORAL at 19:39

## 2023-02-07 RX ADMIN — GABAPENTIN 300 MG: 300 CAPSULE ORAL at 11:52

## 2023-02-07 RX ADMIN — FUROSEMIDE 20 MG: 20 TABLET ORAL at 17:09

## 2023-02-07 RX ADMIN — ENOXAPARIN SODIUM 40 MG: 100 INJECTION SUBCUTANEOUS at 17:09

## 2023-02-07 RX ADMIN — LABETALOL HYDROCHLORIDE 20 MG: 5 INJECTION, SOLUTION INTRAVENOUS at 23:54

## 2023-02-07 RX ADMIN — CARVEDILOL 12.5 MG: 6.25 TABLET, FILM COATED ORAL at 08:44

## 2023-02-07 RX ADMIN — AMLODIPINE BESYLATE 10 MG: 10 TABLET ORAL at 05:24

## 2023-02-07 RX ADMIN — OXYCODONE HYDROCHLORIDE 10 MG: 10 TABLET ORAL at 12:07

## 2023-02-07 RX ADMIN — ASPIRIN 81 MG: 81 TABLET, CHEWABLE ORAL at 05:25

## 2023-02-07 RX ADMIN — SENNOSIDES AND DOCUSATE SODIUM 2 TABLET: 50; 8.6 TABLET ORAL at 17:09

## 2023-02-07 RX ADMIN — SPIRONOLACTONE 25 MG: 25 TABLET ORAL at 05:26

## 2023-02-07 RX ADMIN — GABAPENTIN 300 MG: 300 CAPSULE ORAL at 17:09

## 2023-02-07 RX ADMIN — OXYCODONE HYDROCHLORIDE 10 MG: 10 TABLET ORAL at 05:26

## 2023-02-07 RX ADMIN — LISINOPRIL 20 MG: 20 TABLET ORAL at 05:26

## 2023-02-07 RX ADMIN — CEFAZOLIN 2 G: 2 INJECTION, POWDER, FOR SOLUTION INTRAMUSCULAR; INTRAVENOUS at 00:05

## 2023-02-07 RX ADMIN — GABAPENTIN 300 MG: 300 CAPSULE ORAL at 05:25

## 2023-02-07 RX ADMIN — LISINOPRIL 20 MG: 20 TABLET ORAL at 17:10

## 2023-02-07 ASSESSMENT — ENCOUNTER SYMPTOMS
MYALGIAS: 0
COUGH: 0
BRUISES/BLEEDS EASILY: 0
DIZZINESS: 0
BLURRED VISION: 0
VOMITING: 0
FEVER: 0
SHORTNESS OF BREATH: 0
NAUSEA: 0

## 2023-02-07 ASSESSMENT — PAIN DESCRIPTION - PAIN TYPE
TYPE: ACUTE PAIN;SURGICAL PAIN
TYPE: ACUTE PAIN
TYPE: ACUTE PAIN;SURGICAL PAIN

## 2023-02-07 NOTE — CARE PLAN
The patient is Watcher - Medium risk of patient condition declining or worsening    Shift Goals  Clinical Goals: pain managment, Iv abx  Patient Goals: pain managment  Family Goals: not present    Progress made toward(s) clinical / shift goals:    Problem: Knowledge Deficit - Standard  Goal: Patient and family/care givers will demonstrate understanding of plan of care, disease process/condition, diagnostic tests and medications  Outcome: Progressing  Note: Patient had complaints of left leg pain managed with PRN oxy and flexeril. On fluid restriction and cardiac diet, ordered takeout educate on diet. Ortho boot on, worked with OT.  Patient is not progressing towards the following goals:

## 2023-02-07 NOTE — THERAPY
"Physical Therapy   Daily Treatment     Patient Name: Gage Garcia  Age:  47 y.o., Sex:  male  Medical Record #: [id number]  Today's Date: 2/6/2023     Precautions  Precautions: Toe Touch Weight Bearing Left Lower Extremity  Comments: s/p ORIF LLE. cam boot LLE    Assessment    Pt continues to present w/ pain, impaired mobility, balance, decreased functional L knee flexion ROM, and decreased activity tolerance. Pt able to tolerate similar session to  initial evaluation and is limited by pain. Pt mobilized as described below. Recommend post-acute placement upon d/c. Will continue to follow for IP acute therapy services to address above functional deficits.    Plan    Physical Therapy Treatment Plan  Physical Therapy Treatment Plan: Continue Current Treatment Plan    DC Equipment Recommendations: Unable to determine at this time  Discharge Recommendations: Recommend post-acute placement for additional physical therapy services prior to discharge home      Subjective    \"I feel like I am being a jerk\"    Objective       [facility]06[facility]23 [facility]   [facility]   Documentation Review Approved with modifications made by preceptor in flowsheet   Precautions   Precautions Toe Touch Weight Bearing Left Lower Extremity   Comments s/p ORIF LLE. cam boot LLE   Vitals   O2 (LPM) 0   O2 Delivery Device None - Room Air   Vitals Comments Vitals stable throughout session. Pt reported no dizziness or other symptoms during session   Pain 0 - 10 Group   Therapist Pain Assessment During Activity;Nurse Notified  (not formally assessed, pt reported severe pain in L LE as well as in the L knee.)   Cognition    Cognition / Consciousness WDL   Level of Consciousness Alert   Comments Pleasant and cooperative. Pt endorsing frustration 2/2 to pain and trying to take care of tasks w/ the hospital phone, as pt reports his own phone is broken.   Passive ROM Lower Body   Passive ROM Lower Body X   Lt Knee Flexion Degrees 45   Comments limited 2/2 to pain   Active " "ROM Lower Body    Active ROM Lower Body  X   Comments L LE limited 2/2 to pain, unable to tolerate AROM. R LE WFL. Pt demonstrated R knee flexion ~100 and extension to neutral during bed mobility   Strength Lower Body   Lower Body Strength  X   Comments unable to formally assess strength 2/2 to pain and WB precautions on L LE. R LE able to tolerate full weight in STS and standing for 45 seconds before needing to sit again 2/2 to pain. R LE grossly 4-/5   Sensation Lower Body   Lower Extremity Sensation   WDL   Comments Pt reported no N/T or sensory changes. PT removed from of CAM boot to assess sensation on toes, which were WNL   Lower Body Muscle Tone   Lower Body Muscle Tone  WDL   Supine Lower Body Exercise   Comments pt instructed to continue to wiggle his L toes while in the boot. Pt educated on the benefit of PROM/AAROM of the L knee to reduce swelling and pain   Other Treatments   Other Treatments Provided therapeutic listening provided to pt regarding frustration w/ situation. Hospitalist in room for part of session to explain findings of L knee imaging. Pt reporting he didn't have that before surgery and asking hospitalist, \"dont you think that's weird?\". PT educated pt on how swelling post surgery on L LE can irritate L knee and benefit of ROM and icing to reduce swelling and pain. Pt given Ipad to access emails and [facility]   Neurological Concerns   Neurological Concerns No   Balance   Sitting Balance (Static) Good   Sitting Balance (Dynamic) Fair +   Standing Balance (Static) Fair -   Standing Balance (Dynamic) Poor +   Weight Shift Sitting Fair   Weight Shift Standing Poor   Skilled Intervention Compensatory Strategies;Tactile Cuing;Verbal Cuing;Sequencing   Comments w/ FWW in standing. Pt preferred to perform STS w/ L LE outside of walker   Bed Mobility    Supine to Sit Minimal Assist   Sit to Supine Minimal Assist   Scooting Supervised   Skilled Intervention Compensatory Strategies;Facilitation;Tactile " Cuing;Sequencing;Verbal Cuing   Comments required assist to move L LE during bed mobility and for placement on floor at EOB   Gait Analysis   Gait Level Of Assist Minimal Assist   Assistive Device Front Wheel Walker   Distance (Feet) 1   # of Times Distance was Traveled 1   Deviation Antalgic   Weight Bearing Status TTWB L LE   Skilled Intervention Compensatory Strategies;Facilitation;Tactile Cuing   Comments pt able to take 1 step, declined further ambulation 2/2 to pain. Pt declinded side stepping. Therapist assistance provided for FWW management to prevent tipping   Functional Mobility   Sit to Stand Moderate Assist  (raised bed height)   Bed, Chair, Wheelchair Transfer Refused   Toilet Transfers Refused   Mobility supine > EOB > STS > 1 step > return to supine   Skilled Intervention Compensatory Strategies;Sequencing;Tactile Cuing;Verbal Cuing   Comments limited 2/2 to pain   How much difficulty does the patient currently have...   Turning over in bed (including adjusting bedclothes, sheets and blankets)? 1   Sitting down on and standing up from a chair with arms (e.g., wheelchair, bedside commode, etc.) 1   Moving from lying on back to sitting on the side of the bed? 1   How much help from another person does the patient currently need...   Moving to and from a bed to a chair (including a wheelchair)? 2   Need to walk in a hospital room? 2   Climbing 3-5 steps with a railing? 1   6 clicks Mobility Score 8   Activity Tolerance   Sitting Edge of Bed 10 min   Standing 45  seconds   Comments increased pain with standing   Short Term Goals    Short Term Goal # 1 in 6 visits, pt will perform bed mobility with SPV to allow for safe use of the bed   Goal Outcome # 1 goal not met   Short Term Goal # 2 in 6 visits, pt will perform STS transfer with CGA and FWW to allow for safe transfers from bed/chair.   Goal Outcome # 2 Goal not met   Short Term Goal # 3 in 6 visits, pt will ambulate 50+ ft with FWW and CGA to allow  for safe negotiation of his home.   Goal Outcome # 3 Goal not met   Education Group   Education Provided Role of Physical Therapist;Gait Training;Use of Assistive Device;Weight Bearing Status;Weight Bearing Precautions;Brace Wear and Care   Role of Physical Therapist Patient Response Patient;Acceptance;Explanation;Verbal Demonstration   Gait Training Patient Response Patient;Acceptance;Explanation;Action Demonstration;Reinforcement Needed   Use of Assistive Device Patient Response Patient;Acceptance;Explanation;Action Demonstration   Weight Bearing Status Patient Response Patient;Acceptance;Explanation;Verbal Demonstration;Action Demonstration  (no recall from previous visit)   Weight Bearing Precautions Patient Response Patient;Acceptance;Explanation;Verbal Demonstration  (no recall from previous visit)   Brace Wear & Care Patient Response Patient;Acceptance;Explanation;Verbal Demonstration  (pt educated on getting assistance w/ removing front of brace daily to allow any moisture on L LE inside brace to air out, as well as to inspect L LE and foot for any skin or sensory changes. Pt agreeable)   Additional Comments Pt asked therapists regarding OP details. Pt educated on Mychart and ability to look at OP report. Following education, pt then stated he did not want to look at the report.   Physical Therapy Treatment Plan   Physical Therapy Treatment Plan Continue Current Treatment Plan   Anticipated Discharge Equipment and Recommendations   DC Equipment Recommendations Unable to determine at this time   Discharge Recommendations Recommend post-acute placement for additional physical therapy services prior to discharge home   Interdisciplinary Plan of Care Collaboration   IDT Collaboration with  Nursing;Physician   Patient Position at End of Therapy Seated;Call Light within Reach;Tray Table within Reach  (Ipad)   Collaboration Comments RN updated   Session Information   Date / Session Number  2/6 - 2 (2/3, 2/11)

## 2023-02-07 NOTE — CARE PLAN
The patient is Stable - Low risk of patient condition declining or worsening    Shift Goals  Clinical Goals: pain mgmt, IV abx, BM  Patient Goals: pain mgmt, BM  Family Goals: not present    Progress made toward(s) clinical / shift goals:      Problem: Pain - Standard  Goal: Alleviation of pain or a reduction in pain to the patient’s comfort goal  Outcome: Progressing  Note: Pt states pain 10/10. Pt understands pain scale, current regimen, and possible side effects. Medications administered per MAR, ice pack applied, pillows for comfort and repositioning.       Problem: Knowledge Deficit - Standard  Goal: Patient and family/care givers will demonstrate understanding of plan of care, disease process/condition, diagnostic tests and medications  Outcome: Progressing  Note: POC discussed with pt at bedside regarding diet, fluid restriction, and blood pressure. Pt asks appropriate questions, no additional concerns at this time.         Patient is not progressing towards the following goals:

## 2023-02-07 NOTE — PROGRESS NOTES
Hospital Medicine Daily Progress Note    Date of Service  2/7/2023    Chief Complaint  Gage Garcia is a 47 y.o. male admitted 2/4/2023 with fall    Hospital Course  This a 47-year-old male with a past medical history significant for chronic systolic congestive heart failure, hypertension, obesity transferred from outlying facility with a complaint of left ankle pain.        He is noted to have open ankle fracture, received conscious sedation for reduction in ER.  Orthopedic surgery was consulted, patient underwent  Open treatment of left lateral malleolus ankle fracture with internal fixation; Open repair left syndesmosis with internal fixation;  Irrigation and debridement open fracture with Dr. Burleson on 2/4/2023.  Per orthopedic surgery, patient should be toe-touch weight bearing on the operative extremity.  PT/OT recommending postacute placement.      Interval Problem Update    2/7/2023    Uncontrolled blood pressure  Remain afebrile  Leukocytosis resolved  Blood culture negative so far  Negative procalcitonin  PT/OT recommending SNF placement   assisting            I have discussed this patient's plan of care and discharge plan at IDT rounds today with Case Management, Nursing, Nursing leadership, and other members of the IDT team.    Consultants/Specialty  orthopedics    Code Status  Full Code    Disposition  Patient is medically cleared for discharge.   Anticipate discharge to to skilled nursing facility.  I have placed the appropriate orders for post-discharge needs.    Review of Systems  Review of Systems   Constitutional:  Negative for fever.   HENT:  Negative for hearing loss.    Eyes:  Negative for blurred vision.   Respiratory:  Negative for cough and shortness of breath.    Cardiovascular:  Negative for chest pain.   Gastrointestinal:  Negative for nausea and vomiting.   Genitourinary:  Negative for dysuria.   Musculoskeletal:  Positive for joint pain. Negative for myalgias.   Skin:   Negative for rash.   Neurological:  Negative for dizziness.   Endo/Heme/Allergies:  Does not bruise/bleed easily.      Physical Exam  Temp:  [36.3 °C (97.3 °F)-37.3 °C (99.1 °F)] 36.8 °C (98.2 °F)  Pulse:  [] 87  Resp:  [15-18] 17  BP: (131-183)/() 144/98  SpO2:  [88 %-97 %] 94 %    Physical Exam  Constitutional:       General: He is not in acute distress.  HENT:      Head: Normocephalic and atraumatic.      Right Ear: Tympanic membrane normal.      Left Ear: Tympanic membrane normal.      Nose: Nose normal.      Mouth/Throat:      Mouth: Mucous membranes are moist.   Eyes:      Extraocular Movements: Extraocular movements intact.      Pupils: Pupils are equal, round, and reactive to light.   Cardiovascular:      Rate and Rhythm: Normal rate and regular rhythm.      Pulses: Normal pulses.   Pulmonary:      Effort: Pulmonary effort is normal. No respiratory distress.   Abdominal:      General: Abdomen is flat. There is no distension.   Musculoskeletal:      Cervical back: Normal range of motion.      Right lower leg: No edema.      Left lower leg: No edema.      Comments: Limited range of motion of left hip   Skin:     General: Skin is warm.   Neurological:      General: No focal deficit present.      Mental Status: He is alert and oriented to person, place, and time. Mental status is at baseline.   Psychiatric:         Mood and Affect: Mood normal.       Fluids    Intake/Output Summary (Last 24 hours) at 2/7/2023 1027  Last data filed at 2/7/2023 0920  Gross per 24 hour   Intake 1480 ml   Output 3500 ml   Net -2020 ml       Laboratory  Recent Labs     02/05/23 0249 02/06/23  0154 02/07/23  0202   WBC 11.6* 11.2* 10.8   RBC 5.00 5.13 5.10   HEMOGLOBIN 14.1 14.5 14.6   HEMATOCRIT 42.3 43.2 42.8   MCV 84.6 84.2 83.9   MCH 28.2 28.3 28.6   MCHC 33.3* 33.6* 34.1   RDW 42.9 43.4 42.6   PLATELETCT 177 179 177   MPV 9.9 10.5 10.0     Recent Labs     02/05/23  0249 02/06/23  0154 02/07/23  0202   SODIUM 138 136  137   POTASSIUM 4.5 4.4 5.2   CHLORIDE 104 104 102   CO2 25 24 27   GLUCOSE 111* 97 133*   BUN 23* 23* 29*   CREATININE 1.16 1.16 1.22   CALCIUM 8.4* 8.3* 8.7                   Imaging  DX-KNEE 2- LEFT   Final Result      1.  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   2.  Knee effusion.   3.  No fracture or dislocation.      DX-CHEST-LIMITED (1 VIEW)   Final Result      No acute cardiopulmonary disease evident.      EC-ECHOCARDIOGRAM COMPLETE W/O CONT   Final Result      DX-ANKLE 2- VIEWS LEFT   Final Result      Intraoperative fluoroscopic spot images as described above.      DX-PORTABLE FLUORO > 1 HOUR   Final Result      Intraoperative fluoroscopic spot images as described above.      DX-FOOT-2- LEFT   Final Result         1.  Comminuted fibular metaphyseal fracture and medial malleolus fracture with lateral dislocation of the ankle mortise joint.   2.  Soft tissue gas in the medial ankle, compatible with open fracture.      DX-CHEST-PORTABLE (1 VIEW)   Final Result         1.  No acute cardiopulmonary disease.      DX-ANKLE 3+ VIEWS LEFT   Final Result         1.  Comminuted fibular metaphyseal fracture and medial malleolus fracture with lateral dislocation of the ankle mortise joint.   2.  Soft tissue gas in the medial ankle, compatible with open fracture.      DX-TIBIA AND FIBULA LEFT   Final Result         1.  Comminuted fibular metaphyseal fracture and medial malleolus fracture with lateral dislocation of the ankle mortise joint.   2.  Soft tissue gas in the medial ankle, compatible with open fracture.   3.  Corticated bony fragment near the insertion of the patellar tendon, could be related to prior Osgood Schlatter.           Assessment/Plan  * Ankle fracture, left, open type I or II, initial encounter- (present on admission)  Assessment & Plan  Status post reduction by ED physician  Dr. Persaud consulted and he underwent  Open treatment of left  lateral malleolus ankle fracture with internal fixation; Open repair left syndesmosis with internal fixation;  Irrigation and debridement open fracture with Dr. Zavala on 2/4/2023.  Per orthopedic surgery, patient should be toe-touch weight bearing on the operative extremity.     -- PT/OT evaluate the patient, recommend postacute, SNF referral has been placed  Multimodal pain management, DVT prophylaxis.  At this time patient medically stable to be discharged to skilled nursing facility    Fever  Assessment & Plan  Unlikely underlying sepsis given normal white count, negative procalcitonin  Blood culture negative so far  Continue monitor for now      Advance care planning  Assessment & Plan  Patient was of sound mind and voluntarily participated in the conversation.  Patient  were present for the conversation.  I discussed advance care planning face to face with the patient for at least 20 minutes, including diagnosis, prognosis, plan of care, risks and benefits of any therapies that could be offered, as well as alternatives including palliation .    Wanted to remain full code    Dependence on nicotine from cigarettes  Assessment & Plan  Patient does smoke half pack of cigarettes a day, adverse effect of smoking cigarette has been discussed including COPD, CVA, lung cancer, CAD, history understanding.  He does not want nicotine patch and wanted to postacute.  3 minutes were spent in counseling    Alcohol consumption binge drinking  Assessment & Plan  Patient counseled on cessation  Also counseled on cessation of cocaine  Continue po thiamine and folic acid    Hypokalemia  Assessment & Plan  Will replete and monitor    Chronic systolic CHF (congestive heart failure) (HCC)  Assessment & Plan  No acute exacerbation  Echo showed EF of 40% with grade 1 diastolic dysfunction,  Continue Lasix, I/os, daily weight, fluid restriction 1.5 L, cardiac diet  Continue lisinopril 20 mg p.o. twice daily, Coreg 12.5 mg p.o. twice  daily, Aldactone 25 mg p.o. twice daily, statin  Need cardiology  follow up as an op     Morbid obesity (HCC)- (present on admission)  Assessment & Plan  Body mass index is 40.17 kg/m².   Life style modification incluindg  diet exercise and weight loss as an outpatient    Hypertension- (present on admission)  Assessment & Plan  Patient blood pressure noted to be uncontrolled, continue lisinopril 20 mg p.o. twice daily, Lasix 20 mg p.o. twice daily, Coreg 12.5 mg p.o. twice daily, amlodipine 10 mg   pRN clonidine with SBP greater than 160 po qd           VTE prophylaxis: SCDs/TEDs and enoxaparin ppx    I have performed a physical exam and reviewed and updated ROS and Plan today (2/7/2023). In review of yesterday's note (2/6/2023), there are no changes except as documented above.

## 2023-02-07 NOTE — PROGRESS NOTES
"Patient seen and examined  Slow to mobilize but knee moving and feeling better today    BP (!) 144/98   Pulse 87   Temp 36.8 °C (98.2 °F) (Temporal)   Resp 17   Ht 1.93 m (6' 4\")   Wt (!) 150 kg (330 lb)   SpO2 94%     Recent Labs     02/05/23  0249 02/06/23  0154 02/07/23  0202   WBC 11.6* 11.2* 10.8   RBC 5.00 5.13 5.10   HEMOGLOBIN 14.1 14.5 14.6   HEMATOCRIT 42.3 43.2 42.8   MCV 84.6 84.2 83.9   MCH 28.2 28.3 28.6   MCHC 33.3* 33.6* 34.1   RDW 42.9 43.4 42.6   PLATELETCT 177 179 177   MPV 9.9 10.5 10.0       No acute distress  Dressing clean dry and intact  Neurovascularly intact    POD#3  S/P ORIF ankle    Plan:  DVT Prophylaxis- TEDS/SCDs, lovenox while in hospital, ASA 81 mg PO BID as outpatient  Weight Bearing Status-TTWB LLE  PT/OT  Antibiotics: None  Case Coordination           "

## 2023-02-08 ENCOUNTER — HOSPITAL ENCOUNTER (INPATIENT)
Facility: REHABILITATION | Age: 48
End: 2023-02-08
Attending: PHYSICAL MEDICINE & REHABILITATION | Admitting: PHYSICAL MEDICINE & REHABILITATION
Payer: COMMERCIAL

## 2023-02-08 PROBLEM — M11.20 PSEUDOGOUT: Status: ACTIVE | Noted: 2023-02-08

## 2023-02-08 PROCEDURE — 97116 GAIT TRAINING THERAPY: CPT

## 2023-02-08 PROCEDURE — 99223 1ST HOSP IP/OBS HIGH 75: CPT | Mod: 25 | Performed by: PHYSICAL MEDICINE & REHABILITATION

## 2023-02-08 PROCEDURE — 99406 BEHAV CHNG SMOKING 3-10 MIN: CPT | Performed by: PHYSICAL MEDICINE & REHABILITATION

## 2023-02-08 PROCEDURE — 97535 SELF CARE MNGMENT TRAINING: CPT

## 2023-02-08 PROCEDURE — A9270 NON-COVERED ITEM OR SERVICE: HCPCS

## 2023-02-08 PROCEDURE — 700102 HCHG RX REV CODE 250 W/ 637 OVERRIDE(OP)

## 2023-02-08 PROCEDURE — 700111 HCHG RX REV CODE 636 W/ 250 OVERRIDE (IP): Performed by: HOSPITALIST

## 2023-02-08 PROCEDURE — 700111 HCHG RX REV CODE 636 W/ 250 OVERRIDE (IP): Performed by: STUDENT IN AN ORGANIZED HEALTH CARE EDUCATION/TRAINING PROGRAM

## 2023-02-08 PROCEDURE — 700102 HCHG RX REV CODE 250 W/ 637 OVERRIDE(OP): Performed by: HOSPITALIST

## 2023-02-08 PROCEDURE — 770001 HCHG ROOM/CARE - MED/SURG/GYN PRIV*

## 2023-02-08 PROCEDURE — 99232 SBSQ HOSP IP/OBS MODERATE 35: CPT | Performed by: STUDENT IN AN ORGANIZED HEALTH CARE EDUCATION/TRAINING PROGRAM

## 2023-02-08 PROCEDURE — A9270 NON-COVERED ITEM OR SERVICE: HCPCS | Performed by: HOSPITALIST

## 2023-02-08 PROCEDURE — 97530 THERAPEUTIC ACTIVITIES: CPT

## 2023-02-08 RX ADMIN — AMLODIPINE BESYLATE 10 MG: 10 TABLET ORAL at 06:00

## 2023-02-08 RX ADMIN — LISINOPRIL 20 MG: 20 TABLET ORAL at 06:02

## 2023-02-08 RX ADMIN — ATORVASTATIN CALCIUM 10 MG: 10 TABLET, FILM COATED ORAL at 19:35

## 2023-02-08 RX ADMIN — ISOSORBIDE MONONITRATE 30 MG: 30 TABLET, EXTENDED RELEASE ORAL at 06:01

## 2023-02-08 RX ADMIN — CARVEDILOL 12.5 MG: 6.25 TABLET, FILM COATED ORAL at 09:28

## 2023-02-08 RX ADMIN — OXYCODONE HYDROCHLORIDE 10 MG: 10 TABLET ORAL at 10:18

## 2023-02-08 RX ADMIN — FUROSEMIDE 20 MG: 20 TABLET ORAL at 06:01

## 2023-02-08 RX ADMIN — SENNOSIDES AND DOCUSATE SODIUM 2 TABLET: 50; 8.6 TABLET ORAL at 06:02

## 2023-02-08 RX ADMIN — GABAPENTIN 300 MG: 300 CAPSULE ORAL at 18:07

## 2023-02-08 RX ADMIN — SENNOSIDES AND DOCUSATE SODIUM 2 TABLET: 50; 8.6 TABLET ORAL at 18:08

## 2023-02-08 RX ADMIN — OXYCODONE HYDROCHLORIDE 10 MG: 10 TABLET ORAL at 23:01

## 2023-02-08 RX ADMIN — FUROSEMIDE 20 MG: 20 TABLET ORAL at 18:08

## 2023-02-08 RX ADMIN — CARVEDILOL 12.5 MG: 6.25 TABLET, FILM COATED ORAL at 18:07

## 2023-02-08 RX ADMIN — LISINOPRIL 20 MG: 20 TABLET ORAL at 18:07

## 2023-02-08 RX ADMIN — ASPIRIN 81 MG: 81 TABLET, CHEWABLE ORAL at 06:01

## 2023-02-08 RX ADMIN — GABAPENTIN 300 MG: 300 CAPSULE ORAL at 13:28

## 2023-02-08 RX ADMIN — SPIRONOLACTONE 25 MG: 25 TABLET ORAL at 06:02

## 2023-02-08 RX ADMIN — OXYCODONE HYDROCHLORIDE 10 MG: 10 TABLET ORAL at 03:17

## 2023-02-08 RX ADMIN — ENOXAPARIN SODIUM 40 MG: 100 INJECTION SUBCUTANEOUS at 18:08

## 2023-02-08 RX ADMIN — PREDNISONE 40 MG: 20 TABLET ORAL at 06:02

## 2023-02-08 RX ADMIN — OXYCODONE HYDROCHLORIDE 10 MG: 10 TABLET ORAL at 19:35

## 2023-02-08 RX ADMIN — GABAPENTIN 300 MG: 300 CAPSULE ORAL at 06:01

## 2023-02-08 ASSESSMENT — PAIN DESCRIPTION - PAIN TYPE
TYPE: ACUTE PAIN;SURGICAL PAIN

## 2023-02-08 ASSESSMENT — GAIT ASSESSMENTS
GAIT LEVEL OF ASSIST: MINIMAL ASSIST
ASSISTIVE DEVICE: FRONT WHEEL WALKER
DISTANCE (FEET): 20
DEVIATION: STEP TO

## 2023-02-08 ASSESSMENT — COGNITIVE AND FUNCTIONAL STATUS - GENERAL
CLIMB 3 TO 5 STEPS WITH RAILING: TOTAL
TURNING FROM BACK TO SIDE WHILE IN FLAT BAD: A LITTLE
DAILY ACTIVITIY SCORE: 21
TOILETING: A LITTLE
SUGGESTED CMS G CODE MODIFIER MOBILITY: CL
SUGGESTED CMS G CODE MODIFIER DAILY ACTIVITY: CJ
MOVING FROM LYING ON BACK TO SITTING ON SIDE OF FLAT BED: A LITTLE
HELP NEEDED FOR BATHING: A LITTLE
DRESSING REGULAR LOWER BODY CLOTHING: A LITTLE
MOVING TO AND FROM BED TO CHAIR: UNABLE
MOBILITY SCORE: 14
STANDING UP FROM CHAIR USING ARMS: A LITTLE
WALKING IN HOSPITAL ROOM: A LITTLE

## 2023-02-08 ASSESSMENT — ENCOUNTER SYMPTOMS
BRUISES/BLEEDS EASILY: 0
COUGH: 0
VOMITING: 0
SHORTNESS OF BREATH: 0
DIZZINESS: 0
MYALGIAS: 0
FEVER: 0
BLURRED VISION: 0
NAUSEA: 0

## 2023-02-08 NOTE — DISCHARGE PLANNING
"Extra tall FWW ordered for patient per PT request, height is: 6' 10\".  Kumar at [facility] to place special order and have shipped overnight.   Faxed order to Kumar at [facility]     1.15pm: Met with patient discussed plan for discharge home with HH, does not have PCP therefore CM unable to order HH. Provided DMV application for temporary disability placard.     CM Spoke to Anne-Marie at scheduling, set up patient with PCP Juan Trujillo at [address] [place] [address], [address] on 2/13 at 09.45 am.   "

## 2023-02-08 NOTE — PROGRESS NOTES
Hospital Medicine Daily Progress Note    Date of Service  2/8/2023    Chief Complaint  Gage Garcia is a 47 y.o. male admitted 2/4/2023 with fall    Hospital Course  This a 47-year-old male with a past medical history significant for chronic systolic congestive heart failure, hypertension, obesity transferred from outlying facility with a complaint of left ankle pain.        He is noted to have open ankle fracture, received conscious sedation for reduction in ER.  Orthopedic surgery was consulted, patient underwent  Open treatment of left lateral malleolus ankle fracture with internal fixation; Open repair left syndesmosis with internal fixation;  Irrigation and debridement open fracture with Dr. Burleson on 2/4/2023.  Per orthopedic surgery, patient should be toe-touch weight bearing on the operative extremity.  PT/OT recommending postacute placement.      Interval Problem Update    2/7/2023    Uncontrolled blood pressure  Remain afebrile  Leukocytosis resolved  Blood culture negative so far  Negative procalcitonin  PT/OT recommending SNF placement   assisting    2/8/2023    Vitals remain stable, remained afebrile  Labs reviewed, unremarkable  Blood culture remain negative  Euvolemic on exam  Need placements   assisting        I have discussed this patient's plan of care and discharge plan at IDT rounds today with Case Management, Nursing, Nursing leadership, and other members of the IDT team.    Consultants/Specialty  orthopedics    Code Status  Full Code    Disposition  Patient is medically cleared for discharge.   Anticipate discharge to to skilled nursing facility.  I have placed the appropriate orders for post-discharge needs.    Review of Systems  Review of Systems   Constitutional:  Negative for fever.   HENT:  Negative for hearing loss.    Eyes:  Negative for blurred vision.   Respiratory:  Negative for cough and shortness of breath.    Cardiovascular:  Negative for chest pain.    Gastrointestinal:  Negative for nausea and vomiting.   Genitourinary:  Negative for dysuria.   Musculoskeletal:  Positive for joint pain. Negative for myalgias.   Skin:  Negative for rash.   Neurological:  Negative for dizziness.   Endo/Heme/Allergies:  Does not bruise/bleed easily.      Physical Exam  Temp:  [36.5 °C (97.7 °F)-37.2 °C (98.9 °F)] 37.2 °C (98.9 °F)  Pulse:  [88-92] 88  Resp:  [17-18] 18  BP: (135-186)/() 135/96  SpO2:  [91 %-94 %] 92 %    Physical Exam  Constitutional:       General: He is not in acute distress.  HENT:      Head: Normocephalic and atraumatic.      Right Ear: Tympanic membrane normal.      Left Ear: Tympanic membrane normal.      Nose: Nose normal.      Mouth/Throat:      Mouth: Mucous membranes are moist.   Eyes:      Extraocular Movements: Extraocular movements intact.      Pupils: Pupils are equal, round, and reactive to light.   Cardiovascular:      Rate and Rhythm: Normal rate and regular rhythm.      Pulses: Normal pulses.   Pulmonary:      Effort: Pulmonary effort is normal. No respiratory distress.   Abdominal:      General: Abdomen is flat. There is no distension.   Musculoskeletal:      Cervical back: Normal range of motion.      Right lower leg: No edema.      Left lower leg: No edema.      Comments: Limited range of motion of left hip   Skin:     General: Skin is warm.   Neurological:      General: No focal deficit present.      Mental Status: He is alert and oriented to person, place, and time. Mental status is at baseline.   Psychiatric:         Mood and Affect: Mood normal.       Fluids    Intake/Output Summary (Last 24 hours) at 2/8/2023 1259  Last data filed at 2/8/2023 1000  Gross per 24 hour   Intake 1140 ml   Output 2730 ml   Net -1590 ml         Laboratory  Recent Labs     02/06/23  0154 02/07/23  0202   WBC 11.2* 10.8   RBC 5.13 5.10   HEMOGLOBIN 14.5 14.6   HEMATOCRIT 43.2 42.8   MCV 84.2 83.9   MCH 28.3 28.6   MCHC 33.6* 34.1   RDW 43.4 42.6    PLATELETCT 179 177   MPV 10.5 10.0       Recent Labs     02/06/23  0154 02/07/23  0202   SODIUM 136 137   POTASSIUM 4.4 5.2   CHLORIDE 104 102   CO2 24 27   GLUCOSE 97 133*   BUN 23* 29*   CREATININE 1.16 1.22   CALCIUM 8.3* 8.7                     Imaging  DX-KNEE 2- LEFT   Final Result      1.  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   2.  Knee effusion.   3.  No fracture or dislocation.      DX-CHEST-LIMITED (1 VIEW)   Final Result      No acute cardiopulmonary disease evident.      EC-ECHOCARDIOGRAM COMPLETE W/O CONT   Final Result      DX-ANKLE 2- VIEWS LEFT   Final Result      Intraoperative fluoroscopic spot images as described above.      DX-PORTABLE FLUORO > 1 HOUR   Final Result      Intraoperative fluoroscopic spot images as described above.      DX-FOOT-2- LEFT   Final Result         1.  Comminuted fibular metaphyseal fracture and medial malleolus fracture with lateral dislocation of the ankle mortise joint.   2.  Soft tissue gas in the medial ankle, compatible with open fracture.      DX-CHEST-PORTABLE (1 VIEW)   Final Result         1.  No acute cardiopulmonary disease.      DX-ANKLE 3+ VIEWS LEFT   Final Result         1.  Comminuted fibular metaphyseal fracture and medial malleolus fracture with lateral dislocation of the ankle mortise joint.   2.  Soft tissue gas in the medial ankle, compatible with open fracture.      DX-TIBIA AND FIBULA LEFT   Final Result         1.  Comminuted fibular metaphyseal fracture and medial malleolus fracture with lateral dislocation of the ankle mortise joint.   2.  Soft tissue gas in the medial ankle, compatible with open fracture.   3.  Corticated bony fragment near the insertion of the patellar tendon, could be related to prior Osgood Schlatter.             Assessment/Plan  * Ankle fracture, left, open type I or II, initial encounter- (present on admission)  Assessment & Plan  Status post reduction by ED  physician  Dr. Persaud consulted and he underwent  Open treatment of left lateral malleolus ankle fracture with internal fixation; Open repair left syndesmosis with internal fixation;  Irrigation and debridement open fracture with Dr. Zavala on 2/4/2023.  Per orthopedic surgery, patient should be toe-touch weight bearing on the operative extremity.     -- PT/OT evaluate the patient, recommend postacute, SNF referral has been placed  Multimodal pain management, DVT prophylaxis.  At this time patient medically stable to be discharged to skilled nursing facility    Pseudogout  Assessment & Plan  On a steroid  [place]ali steroid on discharge    Fever  Assessment & Plan  Unlikely underlying sepsis given normal white count, negative procalcitonin  Blood culture negative so far  Continue monitor for now      Advance care planning  Assessment & Plan  Patient was of sound mind and voluntarily participated in the conversation.  Patient  were present for the conversation.  I discussed advance care planning face to face with the patient for at least 20 minutes, including diagnosis, prognosis, plan of care, risks and benefits of any therapies that could be offered, as well as alternatives including palliation .    Wanted to remain full code    Dependence on nicotine from cigarettes  Assessment & Plan  Patient does smoke half pack of cigarettes a day, adverse effect of smoking cigarette has been discussed including COPD, CVA, lung cancer, CAD, history understanding.  He does not want nicotine patch and wanted to postacute.  3 minutes were spent in counseling    Alcohol consumption binge drinking  Assessment & Plan  Patient counseled on cessation  Also counseled on cessation of cocaine  Continue po thiamine and folic acid    Hypokalemia  Assessment & Plan  Will replete and monitor    Chronic systolic CHF (congestive heart failure) (HCC)  Assessment & Plan  No acute exacerbation  Echo showed EF of 40% with grade 1 diastolic  dysfunction,  Continue Lasix, I/os, daily weight, fluid restriction 1.5 L, cardiac diet  Continue lisinopril 20 mg p.o. twice daily, Coreg 12.5 mg p.o. twice daily, Aldactone 25 mg p.o. twice daily, statin  Need cardiology  follow up as an op     Morbid obesity (HCC)- (present on admission)  Assessment & Plan  Body mass index is 40.17 kg/m².   Life style modification incluindg  diet exercise and weight loss as an outpatient    Hypertension- (present on admission)  Assessment & Plan  Patient blood pressure noted to be uncontrolled, continue lisinopril 20 mg p.o. twice daily, Lasix 20 mg p.o. twice daily, Coreg 12.5 mg p.o. twice daily, amlodipine 10 mg   pRN clonidine with SBP greater than 160 po qd           VTE prophylaxis: SCDs/TEDs and enoxaparin ppx    I have performed a physical exam and reviewed and updated ROS and Plan today (2/8/2023). In review of yesterday's note (2/7/2023), there are no changes except as documented above.

## 2023-02-08 NOTE — PROGRESS NOTES
"   Orthopaedic Progress Note    Interval changes:  Patient doing well  LLE in cam boot dressings CDI  Cleared for DC by ortho pending medicine clearance    ROS - Patient denies any new issues.  Pain well controlled.    BP (!) 135/96   Pulse 88   Temp 37.2 °C (98.9 °F) (Temporal)   Resp 18   Ht 1.93 m (6' 4\")   Wt (!) 150 kg (330 lb)   SpO2 92%       Patient seen and examined  No acute distress  Breathing non labored  RRR  LLE cam boot in place, dressings CDI, DNVI, moves all toes, cap refill <2 sec.      Recent Labs     02/06/23  0154 02/07/23  0202   WBC 11.2* 10.8   RBC 5.13 5.10   HEMOGLOBIN 14.5 14.6   HEMATOCRIT 43.2 42.8   MCV 84.2 83.9   MCH 28.3 28.6   MCHC 33.6* 34.1   RDW 43.4 42.6   PLATELETCT 179 177   MPV 10.5 10.0       Active Hospital Problems    Diagnosis     Fever [R50.9]     Dependence on nicotine from cigarettes [F17.210]     Advance care planning [Z71.89]     Ankle fracture, left, open type I or II, initial encounter [S82.892B]     Hypokalemia [E87.6]     Alcohol consumption binge drinking [F10.10]     Chronic systolic CHF (congestive heart failure) (formerly [facility]) [I50.22]     Morbid obesity (formerly [facility]) [E66.01]     Hypertension [I10]        Assessment/Plan:  Patient doing well  LLE in cam boot dressings CDI  Cleared for DC by ortho pending medicine clearance  POD#4 S/P    1. Open treatment of left lateral malleolus ankle fracture with internal fixation  2. Open repair left syndesmosis with internal fixation  3. Irrigation and debridement open fracture   Wt bearing status - TTWB LLE  Wound care/Drains - Dressings to be changed every other day by nursing  Future Procedures - none planned   Lovenox: Start 2/7, Duration-until ambulatory > 150'  Sutures/Staples out- 14 days post operatively  PT/OT-initiated  Antibiotics: Perioperative completed  DVT Prophylaxis- TEDS/SCDs/Foot pumps  Cordero-none  Case Coordination for Discharge Planning - Disposition per therapy recs   "
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
## 2023-02-08 NOTE — THERAPY
Occupational Therapy  Daily Treatment     Patient Name: Gage Garcia  Age:  47 y.o., Sex:  male  Medical Record #: [id number]  Today's Date: 2/8/2023    Precautions: Toe Touch Weight Bearing Left Lower Extremity, CAM Boot  Comments: s/p ORIF L ankle    Assessment    Pt agreeable and receptive to therapy. Provided extensive education on showering/LB dressing as well as training on low toilet transfer using crutches vs FWW. Pt required verbal cues for donning/doffing boot, and CGA for toilet txf. Anticipate pt will progress to DC home with HHOT and listed AD. Pt concerned regarding distance to walk from first come first serve parking to apartment, and inquiring about handicap parking pass. Will continue to follow.    Plan    Occupational Therapy Treatment Plan  O.T. Treatment Plan: (P) Continue Current Treatment Plan    DC Equipment Recommendations: (P) Raised Toilet Seat without Arms, Reacher, Tub Transfer Bench  Discharge Recommendations: (P) Recommend home health for continued occupational therapy services (recommend 1-2 more therapy sessions)       02/08/23 1236   Cognition    Comments very receptive   Other Treatments   Other Treatments Provided Education on showering, dressing, etc.   Balance   Sitting Balance (Static) Good   Sitting Balance (Dynamic) Good   Standing Balance (Static) Fair -   Standing Balance (Dynamic) Poor +   Weight Shift Sitting Good   Weight Shift Standing Poor   Skilled Intervention Verbal Cuing   Bed Mobility    Supine to Sit Standby Assist   Scooting Standby Assist   Skilled Intervention Verbal Cuing   Activities of Daily Living   Grooming Supervision;Seated   Lower Body Dressing Contact Guard Assist  (able to don/doff boot in long sitting in bed)   Toileting Contact Guard Assist   Skilled Intervention Verbal Cuing;Compensatory Strategies   How much help from another person does the patient currently need...   6 Clicks Daily Activity Score 21   Functional Mobility   Sit to Stand Supervised    Bed, Chair, Wheelchair Transfer Supervised   Toilet Transfers Contact Guard Assist  (from low toilet)   Patient / Family Goals   Patient / Family Goal #1 to go home   Short Term Goals   Short Term Goal # 1 Pt to complete toileting with supervision   Goal Outcome # 1 Progressing as expected   Short Term Goal # 2 Pt to complete grooming tasks in standing at edge of sink for greater than 5 mins with supervision   Goal Outcome # 2 Progressing as expected   Short Term Goal # 3 Pt to complete lower body dressing tasks with supervision   Goal Outcome # 3 Progressing as expected   Education Group   Role of Occupational Therapist Patient Response Patient;Acceptance;Explanation   Brace Wear & Care Patient Response Patient;Acceptance;Explanation;Verbal Demonstration   ADL Patient Response Patient;Acceptance;Explanation;Reinforcement Needed   Occupational Therapy Treatment Plan   O.T. Treatment Plan Continue Current Treatment Plan   Anticipated Discharge Equipment and Recommendations   DC Equipment Recommendations Raised Toilet Seat without Arms;Reacher;Tub Transfer Bench   Discharge Recommendations Recommend home health for continued occupational therapy services  (recommend 1-2 more therapy sessions)

## 2023-02-08 NOTE — CARE PLAN
The patient is Stable - Low risk of patient condition declining or worsening    Shift Goals  Clinical Goals: pain managment, PT/OT  Patient Goals: pain managment  Family Goals: not present    Progress made toward(s) clinical / shift goals:    Problem: Knowledge Deficit - Standard  Goal: Patient and family/care givers will demonstrate understanding of plan of care, disease process/condition, diagnostic tests and medications  Outcome: Progressing  Note:   Patient had complaints of left leg pain managed with PRN oxy and flexeril. On fluid restriction and cardiac diet, continues to ask for fluids explained fluid restriction states he still wants his milk with meals even if he goes over fluid restrictions. Performed bed bath independently,  Ortho boot on, worked with OT.       Patient is not progressing towards the following goals:

## 2023-02-08 NOTE — PROGRESS NOTES
"   Orthopaedic Progress Note    Interval changes:  Patient doing well  LLE in cam boot dressings CDI  Cleared for DC by ortho pending medicine clearance    ROS - Patient denies any new issues.  Pain well controlled.    BP (!) 144/98   Pulse 87   Temp 36.8 °C (98.2 °F) (Temporal)   Resp 17   Ht 1.93 m (6' 4\")   Wt (!) 150 kg (330 lb)   SpO2 94%       Patient seen and examined  No acute distress  Breathing non labored  RRR  LLE cam boot in place, dressings CDI, DNVI, moves all toes, cap refill <2 sec.      Recent Labs     02/05/23  0249 02/06/23  0154 02/07/23  0202   WBC 11.6* 11.2* 10.8   RBC 5.00 5.13 5.10   HEMOGLOBIN 14.1 14.5 14.6   HEMATOCRIT 42.3 43.2 42.8   MCV 84.6 84.2 83.9   MCH 28.2 28.3 28.6   MCHC 33.3* 33.6* 34.1   RDW 42.9 43.4 42.6   PLATELETCT 177 179 177   MPV 9.9 10.5 10.0       Active Hospital Problems    Diagnosis     Fever [R50.9]     Dependence on nicotine from cigarettes [F17.210]     Advance care planning [Z71.89]     Ankle fracture, left, open type I or II, initial encounter [S82.892B]     Hypokalemia [E87.6]     Alcohol consumption binge drinking [F10.10]     Chronic systolic CHF (congestive heart failure) ([facility]) [I50.22]     Morbid obesity ([facility]) [E66.01]     Hypertension [I10]        Assessment/Plan:  Patient doing well  LLE in cam boot dressings CDI  Cleared for DC by ortho pending medicine clearance  POD#3 S/P    1. Open treatment of left lateral malleolus ankle fracture with internal fixation  2. Open repair left syndesmosis with internal fixation  3. Irrigation and debridement open fracture   Wt bearing status - TTWB LLE  Wound care/Drains - Dressings to be changed every other day by nursing  Future Procedures - none planned   Lovenox: Start 2/7, Duration-until ambulatory > 150'  Sutures/Staples out- 14 days post operatively  PT/OT-initiated  Antibiotics: Perioperative completed  DVT Prophylaxis- TEDS/SCDs/Foot pumps  Cordero-none  Case Coordination for Discharge Planning - " Disposition per therapy recs

## 2023-02-08 NOTE — FACE TO FACE
Face to Face Note  -  Durable Medical Equipment    Felix Gonzalez M.D. - NPI: [id number]  I certify that this patient is under my care and that they have had a durable medical equipment(DME)face to face encounter by the clinical nurse specialist working collaboratively with me that meets the physician DME face-to-face encounter requirements with this patient on:    Date of encounter:   Patient:                    MRN:                       YOB: 2023  Gage Garcia  [id number]  1975     The encounter with the patient was in whole, or in part, for the following medical condition, which is the primary reason for durable medical equipment:  Other - fracture     I certify that, based on my findings, the following durable medical equipment is medically necessary:  Walkers.      I certify the face to face encounter for this home care referral meets the CMS requirements and the encounter/clinical assessment with the patient was, in whole, or in part, for the medical condition(s) listed above, which is the primary reason for home health care. Based on my clinical findings: the service(s) are medically necessary, support the need for home health care, and the homebound criteria are met.  I certify that this patient has had a face to face encounter by the clinical nurse specialist working collaboratively with me.  Felix Gonzalez M.D. - NPI: [id number]    *Debility, frailty and advanced age in the absence of an acute deterioration or exacerbation of a condition do not qualify a patient for home health.

## 2023-02-08 NOTE — CARE PLAN
The patient is Stable - Low risk of patient condition declining or worsening    Shift Goals  Clinical Goals: pain managment, PT/OT  Patient Goals: pain managment  Family Goals: not present    Progress made toward(s) clinical / shift goals:      Problem: Pain - Standard  Goal: Alleviation of pain or a reduction in pain to the patient’s comfort goal  Outcome: Progressing  Note: Pt states pain 4/10. Medications administered per MAR, ice pack applied, pillows for comfort and repositioning.       Problem: Knowledge Deficit - Standard  Goal: Patient and family/care givers will demonstrate understanding of plan of care, disease process/condition, diagnostic tests and medications  Outcome: Progressing  Note: POC discussed with pt at bedside at length regarding fluid restriction and BP control.     Problem: Fall Risk  Goal: Patient will remain free from falls  Outcome: Progressing  Note: Pt is 1xAssist with FWW. Bed is locked and in lowest position, treaded socks on, DME out of sight, call light and belongings within reach, pt calls appropriately for assistance, hourly rounding in place.         Patient is not progressing towards the following goals:

## 2023-02-08 NOTE — DISCHARGE PLANNING
Other ortho with ongoing medical management for HTN, CHF with EF40% steroid induced hyperglycemia as well as therapy need. Anticipate post acute services to facilitate a successful transition to community home alone single story apartment with no steps to enter. Please consider a PM&R referral to assist with discharge planning. [employer] provider.

## 2023-02-08 NOTE — CARE PLAN
The patient is Stable - Low risk of patient condition declining or worsening    Shift Goals  Clinical Goals: mobility; pain control; I/O  Patient Goals: get off fluid restriction  Family Goals: not prsent    Progress made toward(s) clinical / shift goals:    Problem: Pain - Standard  Goal: Alleviation of pain or a reduction in pain to the patient’s comfort goal  Outcome: Progressing     Problem: Knowledge Deficit - Standard  Goal: Patient and family/care givers will demonstrate understanding of plan of care, disease process/condition, diagnostic tests and medications  Outcome: Progressing     Problem: Fall Risk  Goal: Patient will remain free from falls  Outcome: Progressing       Patient is not progressing towards the following goals:

## 2023-02-08 NOTE — DISCHARGE PLANNING
Case Management Discharge Planning    Admission Date: 2/4/2023  GMLOS: 4  ALOS: 4    6-Clicks ADL Score: 18  6-Clicks Mobility Score: 8  PT and/or OT Eval ordered: Yes  Post-acute Referrals Ordered: No  Post-acute Choice Obtained: No  Has referral(s) been sent to post-acute provider:  No      Anticipated Discharge Dispo: Discharge Disposition: Disch to IP rehab facility or distinct part unit (62)    DME Needed: No    Action(s) Taken: MDR discussion, POD 4, TTWB LLE. PM&R eval pending, PT to re-eval patient today.   CM met with patient at bedside, discussed plan for possible IRF pending PT recs. Patient would prefer to go home if possible with knee scooter. He is concerned about deliveries outside his home as he does not have anyone to pick them up.   CM will continue to follow.     Escalations Completed: PT    Medically Clear: No    Next Steps: PT recs, PM&R eval.     Barriers to Discharge: Medical clearance and Pending PT Evaluation    Is the patient up for discharge tomorrow: No

## 2023-02-08 NOTE — THERAPY
"Physical Therapy   Daily Treatment     Patient Name: Gage Garcia  Age:  47 y.o., Sex:  male  Medical Record #: [id number]  Today's Date: 2/8/2023     Precautions  Precautions: Toe Touch Weight Bearing Left Lower Extremity;CAM Boot  Comments: s/p ORIF L ankle    Assessment    Pt seen for PT treatment session. Pt is 6'10\" and currently using FWW that is too short for him but functional. Introduced knee scooter and crutches at pt's request. Brief trial of cxs with pt preferring to use FWW due to stability. Attempted knee scooter; however, pt unable to tolerate adequate knee flexion and WBing through knee to manage knee scooter effectively. Pt anticipates knee ROM will continue to improve and would like to continue trialing knee scooter to assess if possible to use, particularly to access work environment. Today, pt tolerated ambulation x20 ft with FWW and cues to maintain TTWB. Plan to find pt taller FWW for appropriate fit for next session. Increased to 5x/wk in anticipation of DC home and to best assess DME needs.       Plan    Physical Therapy Treatment Plan  Physical Therapy Treatment Plan: Modify Current Treatment Plan  Modified Plan: Change to Five Times per Week, Gait Training, Neuro Re-Education / Balance, Self Care / Home Evaluation, Therapeutic Activities, Equipment, Therapeutic Exercise (trial various DME for DC home)    DC Equipment Recommendations: BARIATRIC Front Wheeled Walker to accommodate pt's height (pt is actually 6'10\")    Discharge Recommendations: Recommend home health for continued physical therapy services (with caregiver or transportation resources)     02/08/23 1226   Precautions   Precautions Toe Touch Weight Bearing Left Lower Extremity;CAM Boot   Comments s/p ORIF L ankle   Vitals   O2 Delivery Device None - Room Air   Pain 0 - 10 Group   Therapist Pain Assessment During Activity;Nurse Notified  (L LE pain at knee, ankle)   Cognition    Cognition / Consciousness WDL   Level of Consciousness " Alert   Comments pleasant and receptive to PT, requested to trial knee scooter as option for mobility   Active ROM Lower Body    Active ROM Lower Body  X   Comments painful L knee flexion > 90 * limiting abilty to use knee scooter   Strength Lower Body   Lower Body Strength  X   Comments L LE NT due to post op status and pain, able to maintain TTWB restriction   Other Treatments   Other Treatments Provided educated on progressive knee flexion / extension as tolerated   Balance   Sitting Balance (Static) Good   Sitting Balance (Dynamic) Good   Standing Balance (Static) Fair -   Standing Balance (Dynamic) Poor +   Weight Shift Sitting Good   Weight Shift Standing Poor   Skilled Intervention Compensatory Strategies   Comments with B cxs, knee scooter and FWW. Best balance with FWW overall   Bed Mobility    Supine to Sit Standby Assist   Scooting Standby Assist   Skilled Intervention Verbal Cuing   Comments pt used B UEs to support LE OOB   Gait Analysis   Gait Level Of Assist Minimal Assist  (with B cxs to close CGA with FWW)   Assistive Device Front Wheel Walker   Distance (Feet) 20  (with FWW, 2 ft with B cxs)   Deviation Step To   # of Stairs Climbed 0   Weight Bearing Status TTWB L LE   Skilled Intervention Verbal Cuing;Compensatory Strategies;Sequencing   Comments pt agrees FWW is optimal device for stability and energy conservation.  unable to tolerate positioning to trial knee scooter at this time   Functional Mobility   Sit to Stand Contact Guard Assist   Bed, Chair, Wheelchair Transfer Contact Guard Assist  (chair height raised for pt's height)   Transfer Method Stand Step   Skilled Intervention Verbal Cuing;Compensatory Strategies   Comments cues for offloading L LE to maintain TTWB during transfers   How much difficulty does the patient currently have...   Turning over in bed (including adjusting bedclothes, sheets and blankets)? 3   Sitting down on and standing up from a chair with arms (e.g., wheelchair,  bedside commode, etc.) 3   Moving from lying on back to sitting on the side of the bed? 1   How much help from another person does the patient currently need...   Moving to and from a bed to a chair (including a wheelchair)? 3   Need to walk in a hospital room? 3   Climbing 3-5 steps with a railing? 1   6 clicks Mobility Score 14   Short Term Goals    Short Term Goal # 1 in 6 visits, pt will perform bed mobility with SPV to allow for safe use of the bed   Goal Outcome # 1 Progressing as expected   Short Term Goal # 2 in 6 visits, pt will perform STS transfer with CGA and FWW to allow for safe transfers from bed/chair.   Goal Outcome # 2 Goal met, new goal added   Short Term Goal # 2 B  pt will perform all functional transfers with LRAD and Mod I to return home alone in 6 visits   Short Term Goal # 3 in 6 visits, pt will ambulate 50+ ft with FWW and CGA to allow for safe negotiation of his home.   Goal Outcome # 3 Progressing as expected   Education Group   Additional Comments discussed general healing process, and likely need for OP PT follow up.   Physical Therapy Treatment Plan   Physical Therapy Treatment Plan Modify Current Treatment Plan   Modified Plan Change to Five Times per Week;Gait Training;Neuro Re-Education / Balance;Self Care / Home Evaluation;Therapeutic Activities;Equipment;Therapeutic Exercise  (trial various DME for DC home)   Anticipated Discharge Equipment and Recommendations   DC Equipment Recommendations Front-Wheel Walker  (must be tall enough and accomodate pt's weight. bariatric?)   Discharge Recommendations Recommend home health for continued physical therapy services  (with caregiver or transportation resources)   Interdisciplinary Plan of Care Collaboration   IDT Collaboration with  Nursing   Patient Position at End of Therapy Seated;Call Light within Reach;Tray Table within Reach   Collaboration Comments aware of PT session   Session Information   Date / Session Number  2/8- 3 (1/5,  2/14)

## 2023-02-09 ENCOUNTER — PHARMACY VISIT (OUTPATIENT)
Dept: PHARMACY | Facility: MEDICAL CENTER | Age: 48
End: 2023-02-09
Payer: COMMERCIAL

## 2023-02-09 VITALS
RESPIRATION RATE: 17 BRPM | HEART RATE: 87 BPM | DIASTOLIC BLOOD PRESSURE: 99 MMHG | BODY MASS INDEX: 38.36 KG/M2 | TEMPERATURE: 97.9 F | HEIGHT: 76 IN | OXYGEN SATURATION: 93 % | SYSTOLIC BLOOD PRESSURE: 152 MMHG | WEIGHT: 315 LBS

## 2023-02-09 PROCEDURE — 700111 HCHG RX REV CODE 636 W/ 250 OVERRIDE (IP): Performed by: HOSPITALIST

## 2023-02-09 PROCEDURE — 99239 HOSP IP/OBS DSCHRG MGMT >30: CPT | Performed by: STUDENT IN AN ORGANIZED HEALTH CARE EDUCATION/TRAINING PROGRAM

## 2023-02-09 PROCEDURE — 700102 HCHG RX REV CODE 250 W/ 637 OVERRIDE(OP): Performed by: HOSPITALIST

## 2023-02-09 PROCEDURE — A9270 NON-COVERED ITEM OR SERVICE: HCPCS

## 2023-02-09 PROCEDURE — A9270 NON-COVERED ITEM OR SERVICE: HCPCS | Performed by: HOSPITALIST

## 2023-02-09 PROCEDURE — 97116 GAIT TRAINING THERAPY: CPT

## 2023-02-09 PROCEDURE — RXMED WILLOW AMBULATORY MEDICATION CHARGE: Performed by: STUDENT IN AN ORGANIZED HEALTH CARE EDUCATION/TRAINING PROGRAM

## 2023-02-09 PROCEDURE — 700102 HCHG RX REV CODE 250 W/ 637 OVERRIDE(OP)

## 2023-02-09 RX ORDER — CARVEDILOL 12.5 MG/1
25 TABLET ORAL 2 TIMES DAILY WITH MEALS
Qty: 120 TABLET | Refills: 0 | Status: SHIPPED | OUTPATIENT
Start: 2023-02-09 | End: 2023-03-11

## 2023-02-09 RX ORDER — ISOSORBIDE MONONITRATE 30 MG/1
30 TABLET, EXTENDED RELEASE ORAL DAILY
Qty: 30 TABLET | Refills: 0 | Status: ON HOLD | OUTPATIENT
Start: 2023-02-10 | End: 2023-03-18

## 2023-02-09 RX ORDER — AMLODIPINE BESYLATE 10 MG/1
10 TABLET ORAL DAILY
Qty: 30 TABLET | Refills: 0 | Status: ON HOLD | OUTPATIENT
Start: 2023-02-10 | End: 2023-03-18

## 2023-02-09 RX ORDER — SPIRONOLACTONE 25 MG/1
25 TABLET ORAL DAILY
Qty: 30 TABLET | Refills: 0 | Status: ON HOLD | OUTPATIENT
Start: 2023-02-10 | End: 2023-03-18

## 2023-02-09 RX ORDER — LISINOPRIL 20 MG/1
20 TABLET ORAL 2 TIMES DAILY
Qty: 60 TABLET | Refills: 0 | Status: SHIPPED | OUTPATIENT
Start: 2023-02-09 | End: 2023-03-11

## 2023-02-09 RX ORDER — PREDNISONE 10 MG/1
TABLET ORAL
Qty: 32 TABLET | Refills: 0 | Status: SHIPPED | OUTPATIENT
Start: 2023-02-09 | End: 2023-02-24

## 2023-02-09 RX ADMIN — ISOSORBIDE MONONITRATE 30 MG: 30 TABLET, EXTENDED RELEASE ORAL at 04:58

## 2023-02-09 RX ADMIN — ASPIRIN 81 MG: 81 TABLET, CHEWABLE ORAL at 04:54

## 2023-02-09 RX ADMIN — FUROSEMIDE 20 MG: 20 TABLET ORAL at 04:55

## 2023-02-09 RX ADMIN — OXYCODONE HYDROCHLORIDE 10 MG: 10 TABLET ORAL at 04:55

## 2023-02-09 RX ADMIN — GABAPENTIN 300 MG: 300 CAPSULE ORAL at 04:54

## 2023-02-09 RX ADMIN — SPIRONOLACTONE 25 MG: 25 TABLET ORAL at 04:54

## 2023-02-09 RX ADMIN — PREDNISONE 40 MG: 20 TABLET ORAL at 04:54

## 2023-02-09 RX ADMIN — OXYCODONE HYDROCHLORIDE 10 MG: 10 TABLET ORAL at 08:39

## 2023-02-09 RX ADMIN — OXYCODONE HYDROCHLORIDE 10 MG: 10 TABLET ORAL at 11:52

## 2023-02-09 RX ADMIN — SENNOSIDES AND DOCUSATE SODIUM 2 TABLET: 50; 8.6 TABLET ORAL at 04:54

## 2023-02-09 RX ADMIN — GABAPENTIN 300 MG: 300 CAPSULE ORAL at 11:34

## 2023-02-09 RX ADMIN — LISINOPRIL 20 MG: 20 TABLET ORAL at 04:54

## 2023-02-09 RX ADMIN — AMLODIPINE BESYLATE 10 MG: 10 TABLET ORAL at 04:55

## 2023-02-09 RX ADMIN — CARVEDILOL 12.5 MG: 6.25 TABLET, FILM COATED ORAL at 08:28

## 2023-02-09 ASSESSMENT — GAIT ASSESSMENTS
DISTANCE (FEET): 20
ASSISTIVE DEVICE: FRONT WHEEL WALKER
GAIT LEVEL OF ASSIST: SUPERVISED
DEVIATION: STEP TO

## 2023-02-09 ASSESSMENT — COGNITIVE AND FUNCTIONAL STATUS - GENERAL
MOVING TO AND FROM BED TO CHAIR: A LITTLE
CLIMB 3 TO 5 STEPS WITH RAILING: TOTAL
MOVING FROM LYING ON BACK TO SITTING ON SIDE OF FLAT BED: A LITTLE
TURNING FROM BACK TO SIDE WHILE IN FLAT BAD: A LITTLE
WALKING IN HOSPITAL ROOM: A LITTLE
SUGGESTED CMS G CODE MODIFIER MOBILITY: CK
STANDING UP FROM CHAIR USING ARMS: A LITTLE
MOBILITY SCORE: 16

## 2023-02-09 ASSESSMENT — PAIN DESCRIPTION - PAIN TYPE
TYPE: ACUTE PAIN;SURGICAL PAIN

## 2023-02-09 NOTE — PROGRESS NOTES
Discharge instructions provided to pt.  Pt states understanding.  Pt states all questions have been answered.  Copy of discharge provided to pt.  Signed copy in chart.  IV removed. Pending meds 2 bed delivery.

## 2023-02-09 NOTE — DISCHARGE PLANNING
Per physiatry patient does not require IPR, recommending dc home with OP services. [facility] will no longer follow.

## 2023-02-09 NOTE — CARE PLAN
The patient is Stable - Low risk of patient condition declining or worsening    Shift Goals  Clinical Goals: mobility, pain control  Patient Goals: pain control, rest  Family Goals: not present    Progress made toward(s) clinical / shift goals:      Problem: Pain - Standard  Goal: Alleviation of pain or a reduction in pain to the patient’s comfort goal  Outcome: Progressing  Note: Pt states pain 7/10. Medications administered per MAR, ice pack applied, pillows for comfort and repositioning.       Problem: Knowledge Deficit - Standard  Goal: Patient and family/care givers will demonstrate understanding of plan of care, disease process/condition, diagnostic tests and medications  Outcome: Progressing  Note: POC discussed with pt at bedside regarding discharge barriers and post op incision care. Pt asks appropriate questions, no additional concerns at this time.       Problem: Fall Risk  Goal: Patient will remain free from falls  Outcome: Progressing  Note: Bed is locked and in lowest position, treaded socks on, DME out of sight, call light and belongings within reach, pt calls appropriately for assistance, hourly rounding in place.         Patient is not progressing towards the following goals:

## 2023-02-09 NOTE — FACE TO FACE
Face to Face Note  -  Durable Medical Equipment    Felix Gonzalez M.D. - NPI: [id number]  I certify that this patient is under my care and that they have had a durable medical equipment(DME)face to face encounter by the clinical nurse specialist working collaboratively with me that meets the physician DME face-to-face encounter requirements with this patient on:    Date of encounter:   Patient:                    MRN:                       YOB: 20[birth year]  Gage Garcia  [id number]  1975     The encounter with the patient was in whole, or in part, for the following medical condition, which is the primary reason for durable medical equipment:  Other - fracture    I certify that, based on my findings, the following durable medical equipment is medically necessary:  Raised Toilet Seat without Arms, Reacher, Tub Transfer Bench.        ------------------------------------------------------------------------------------------------------------------    Face to Face Supporting Documentation - Home Health    The encounter with this patient was in whole or in part the primary reason for home health admission.    Date of encounter:   Patient:                    MRN:                       YOB: 2023  Gage Garcia  [id number]  1975     Home health to see patient for:  Skilled Nursing care for assessment, interventions & education, Physical Therapy evaluation and treatment, and Occupational therapy evaluation and treatment    Skilled need for:  Comment: Pt/Ot     Skilled nursing interventions to include:  Comment: PT/IT    Homebound evidenced status by:  Need the aid of supportive devices such as crutches, canes, wheelchairs or walkers. Leaving home must require a considerable and taxing effort. There must exist a normal inability to leave the home.    Community Physician to provide follow up care: Pcp Pt States None     Optional Interventions    Wound information & treatment:    Home  Infusion Therapy orders:    Line/Drain/Airway:    I certify the face to face encounter for this home care referral meets the CMS requirements and the encounter/clinical assessment with the patient was, in whole, or in part, for the medical condition(s) listed above, which is the primary reason for home health care. Based on my clinical findings: the service(s) are medically necessary, support the need for home health care, and the homebound criteria are met.  I certify that this patient has had a face to face encounter by the clinical nurse specialist working collaboratively with me.  Felix Gonzalez M.D. - NPI: [id number]    *Debility, frailty and advanced age in the absence of an acute deterioration or exacerbation of a condition do not qualify a patient for home health.

## 2023-02-09 NOTE — CARE PLAN
The patient is Stable - Low risk of patient condition declining or worsening    Shift Goals  Clinical Goals: pain control, mobility  Patient Goals: care coordation  Family Goals: not present    Progress made toward(s) clinical / shift goals:  yes      Problem: Pain - Standard  Goal: Alleviation of pain or a reduction in pain to the patient’s comfort goal  Outcome: Progressing     Problem: Fall Risk  Goal: Patient will remain free from falls  Outcome: Progressing     Problem: Respiratory/Oxygenation Function Post-Surgical  Goal: Patient will achieve/maintain normal respiratory rate/effort  Outcome: Progressing     Problem: Early Mobilization - Post Surgery  Goal: Early mobilization post surgery  Outcome: Progressing     Problem: Wound/ / Incision Healing  Goal: Patient's wound/surgical incision will decrease in size and heals properly  Outcome: Progressing

## 2023-02-09 NOTE — THERAPY
Physical Therapy   Daily Treatment     Patient Name: Gage Garcia  Age:  47 y.o., Sex:  male  Medical Record #: [id number]  Today's Date: 2/9/2023     Precautions  Precautions: CAM Boot;Toe Touch Weight Bearing Left Lower Extremity    Assessment    Pt received up at EOB with RN present. Pt with FWW for DC. FWW fitted to patient. FWW on highest setting available for height and pt still required to slightly flex his trunk to be able to use FWW due to patient's increased height. Pt was able to demonstrate gait with FWW without physical assistance. Pt stated understand on use of FWW in home.      Plan    Physical Therapy Treatment Plan  Physical Therapy Treatment Plan: Continue Current Treatment Plan  Modified Plan: Change to Five Times per Week, Gait Training, Neuro Re-Education / Balance, Self Care / Home Evaluation, Therapeutic Activities, Equipment, Therapeutic Exercise (trial various DME for DC home)    DC Equipment Recommendations: Front-Wheel Walker  Discharge Recommendations: Recommend home health for continued physical therapy services       Objective       02/09/23 1153   Precautions   Precautions CAM Boot;Toe Touch Weight Bearing Left Lower Extremity   Pain 0 - 10 Group   Therapist Pain Assessment Nurse Notified;Post Activity Pain Same as Prior to Activity  (no c/o)   Cognition    Level of Consciousness Alert   Balance   Sitting Balance (Static) Good   Sitting Balance (Dynamic) Good   Standing Balance (Static) Fair   Standing Balance (Dynamic) Fair   Weight Shift Sitting Good   Weight Shift Standing Fair   Skilled Intervention Verbal Cuing;Compensatory Strategies   Bed Mobility    Comments EOB upon arrival and exit   Gait Analysis   Gait Level Of Assist Supervised   Assistive Device Front Wheel Walker   Distance (Feet) 20   # of Times Distance was Traveled 2   Deviation Step To   Weight Bearing Status TTWB L LE   Skilled Intervention Verbal Cuing;Compensatory Strategies   Comments Pt was fitted for his FWW to be  the correct height. Even with the FWW on the highest available setting the pt still had to compensate with a slightly flexed posture. Pt was educated on use of FWW for safety in home mobility. Pt stated understanding of all education.   Functional Mobility   Sit to Stand Supervised   Bed, Chair, Wheelchair Transfer Supervised   Transfer Method Stand Step   Mobility EOB>STS>gait>EOB   Skilled Intervention Verbal Cuing;Compensatory Strategies   How much difficulty does the patient currently have...   Turning over in bed (including adjusting bedclothes, sheets and blankets)? 3   Sitting down on and standing up from a chair with arms (e.g., wheelchair, bedside commode, etc.) 3   Moving from lying on back to sitting on the side of the bed? 3   How much help from another person does the patient currently need...   Moving to and from a bed to a chair (including a wheelchair)? 3   Need to walk in a hospital room? 3   Climbing 3-5 steps with a railing? 1   6 clicks Mobility Score 16   Activity Tolerance   Sitting Edge of Bed up pre and post   Standing 8 min   Short Term Goals    Short Term Goal # 1 in 6 visits, pt will perform bed mobility with SPV to allow for safe use of the bed   Goal Outcome # 1 Progressing as expected   Short Term Goal # 2 in 6 visits, pt will perform STS transfer with CGA and FWW to allow for safe transfers from bed/chair.   Goal Outcome # 2 Goal met, new goal added   Short Term Goal # 2 B  pt will perform all functional transfers with LRAD and Mod I to return home alone in 6 visits   Goal Outcome # 2 B Progressing as expected   Short Term Goal # 3 in 6 visits, pt will ambulate 50+ ft with FWW and CGA to allow for safe negotiation of his home.   Goal Outcome # 3 Progressing as expected   Education Group   Education Provided Role of Physical Therapist;Gait Training   Role of Physical Therapist Patient Response Patient;Acceptance;Explanation;Verbal Demonstration   Gait Training Patient Response  Patient;Acceptance;Explanation;Action Demonstration;Reinforcement Needed   Use of Assistive Device Patient Response Patient;Acceptance;Explanation;Action Demonstration   Physical Therapy Treatment Plan   Physical Therapy Treatment Plan Continue Current Treatment Plan   Anticipated Discharge Equipment and Recommendations   DC Equipment Recommendations Front-Wheel Walker   Discharge Recommendations Recommend home health for continued physical therapy services   Interdisciplinary Plan of Care Collaboration   IDT Collaboration with  Nursing   Patient Position at End of Therapy Seated  (RN at bedside)   Collaboration Comments RN aware   Session Information   Date / Session Number  2/9-4(2/5, 2/14)

## 2023-02-09 NOTE — DISCHARGE PLANNING
Call from Zacarias at [facility], tall FWW will be delivered tomorrow as it is a special order.   Left voice message for Richard at [facility].     Spoke to Cherise at [facility] office, discussed need for HH order and MD signature on DMV disability form.   MD to be notified regarding the above needs.     10.45 am: Richard here from [facility] with tall FWW, patient able to use it an in agreement with plan for discharge home today. He will follow up with PCP appt on Monday for HH order and signature on DMV form.  Discussed with patient at bedside, provided taxi voucher to patient.

## 2023-02-11 LAB
BACTERIA BLD CULT: NORMAL
BACTERIA BLD CULT: NORMAL
SIGNIFICANT IND 70042: NORMAL
SIGNIFICANT IND 70042: NORMAL
SITE SITE: NORMAL
SITE SITE: NORMAL
SOURCE SOURCE: NORMAL
SOURCE SOURCE: NORMAL

## 2023-02-13 ENCOUNTER — OFFICE VISIT (OUTPATIENT)
Dept: MEDICAL GROUP | Facility: MEDICAL CENTER | Age: 48
End: 2023-02-13
Payer: COMMERCIAL

## 2023-02-13 VITALS
WEIGHT: 315 LBS | TEMPERATURE: 97.5 F | SYSTOLIC BLOOD PRESSURE: 132 MMHG | OXYGEN SATURATION: 97 % | BODY MASS INDEX: 38.36 KG/M2 | HEART RATE: 86 BPM | DIASTOLIC BLOOD PRESSURE: 86 MMHG | HEIGHT: 76 IN

## 2023-02-13 DIAGNOSIS — Z11.3 ROUTINE SCREENING FOR STI (SEXUALLY TRANSMITTED INFECTION): ICD-10-CM

## 2023-02-13 DIAGNOSIS — I10 PRIMARY HYPERTENSION: ICD-10-CM

## 2023-02-13 DIAGNOSIS — S82.892E TYPE I OR II OPEN FRACTURE OF LEFT ANKLE WITH ROUTINE HEALING, SUBSEQUENT ENCOUNTER: ICD-10-CM

## 2023-02-13 DIAGNOSIS — E66.01 MORBID OBESITY WITH BMI OF 40.0-44.9, ADULT (HCC): ICD-10-CM

## 2023-02-13 DIAGNOSIS — I50.22 CHRONIC SYSTOLIC CHF (CONGESTIVE HEART FAILURE) (HCC): ICD-10-CM

## 2023-02-13 PROBLEM — R50.9 FEVER: Status: RESOLVED | Noted: 2023-02-06 | Resolved: 2023-02-13

## 2023-02-13 PROBLEM — E87.6 HYPOKALEMIA: Status: RESOLVED | Noted: 2023-02-04 | Resolved: 2023-02-13

## 2023-02-13 PROCEDURE — 99214 OFFICE O/P EST MOD 30 MIN: CPT | Performed by: STUDENT IN AN ORGANIZED HEALTH CARE EDUCATION/TRAINING PROGRAM

## 2023-02-13 RX ORDER — FUROSEMIDE 20 MG/1
20 TABLET ORAL 2 TIMES DAILY
Qty: 180 TABLET | Refills: 3 | Status: ON HOLD | OUTPATIENT
Start: 2023-02-13 | End: 2023-03-18 | Stop reason: SDUPTHER

## 2023-02-13 ASSESSMENT — ENCOUNTER SYMPTOMS
DIZZINESS: 0
HEADACHES: 0
WEIGHT LOSS: 0
CHILLS: 0
VOMITING: 0
FEVER: 0
PALPITATIONS: 0
SHORTNESS OF BREATH: 0
WHEEZING: 0
NAUSEA: 0

## 2023-02-13 ASSESSMENT — FIBROSIS 4 INDEX: FIB4 SCORE: 1.22

## 2023-02-13 NOTE — PROGRESS NOTES
"Subjective:     CC: left ankle fracture s/p ORIF, Chronic systolic heart failure    HPI:   Gage presents today with    Problem   Morbid Obesity With Bmi of 40.0-44.9, Adult (Hcc)    Previously watching his diet and exercise regularly. However limited due to ankle fracture     Open Fracture of Left Ankle   Chronic Systolic Chf (Congestive Heart Failure) (Hcc)    Chronic systolic CHF, etiology unknown, first noted 07/2022 presented at ED with SOB, PND and noted to have hypertensive emergency. Initial echocardiogram showed LVEF 30%.     Last Echo 02/2023 :   \"Contrast offered but pt refused.  Technically difficult due to body habtus, positioning and pt left leg   in boot from previous surgery but adequate study for interpretation.   Moderately reduced left ventricular systolic function.  The left ventricular ejection fraction is visually estimated to be 40%.  Moderate concentric left ventricular hypertrophy.  Enlarged left ventricular chamber size.  Grade I diastolic dysfunction.  Normal right ventricular size and systolic function.  No significant valvular stenosis or regurgitation seen.  Unable to estimate right sided intracardiac pressures.\"     Hypertension    Hx of hypertensive urgency and hx HFrEF 07/2020 ( 30%, improved 2023)  - current discharged on   amlodipine 10mg,   carvedilol 12.5mg BID,   lisinopril 20mg BID,   imdur 30mg daily,   spironolactone 25mg daily   Furosemide 20mg BID    Previously taking ibuprofen 800mg for headache     02/13/2023  - discussed sleep study- patient would like to defer until financial  - discussed secondary workup for HTN however patient would like to defer at this time     Fever (Resolved)   Hypokalemia (Resolved)   Morbid Obesity (Hcc) (Resolved)       Health Maintenance: acutely ill    ROS:  Review of Systems   Constitutional:  Negative for chills, fever and weight loss.   HENT:  Negative for hearing loss.    Respiratory:  Negative for shortness of breath and wheezing.  " "  Cardiovascular:  Negative for chest pain and palpitations.   Gastrointestinal:  Negative for nausea and vomiting.   Genitourinary:  Negative for frequency and urgency.   Skin:  Negative for rash.   Neurological:  Negative for dizziness and headaches.     Objective:     Exam:  /86 (BP Location: Left arm, Patient Position: Sitting, BP Cuff Size: Adult)   Pulse 86   Temp 36.4 °C (97.5 °F) (Temporal)   Ht 1.93 m (6' 4\")   Wt (!) 163 kg (358 lb 12.8 oz)   SpO2 97%   BMI 43.67 kg/m²  Body mass index is 43.67 kg/m².    Physical Exam  Constitutional:       Appearance: Normal appearance.   Cardiovascular:      Rate and Rhythm: Normal rate and regular rhythm.   Pulmonary:      Effort: Pulmonary effort is normal.      Breath sounds: Normal breath sounds.   Musculoskeletal:      Cervical back: Normal range of motion and neck supple.   Lymphadenopathy:      Cervical: No cervical adenopathy.   Neurological:      Mental Status: He is alert.         Labs: cr 1.25,     Assessment & Plan:     47 y.o. male with the following -     1. Type I or II open fracture of left ankle with routine healing, subsequent encounter  Chronic, status post ORIF, stable  Patient reports he has not called and schedule appointment with his orthopedic surgeon and was counseled to do so.  On physical examination there is not significant pain around the wound site.  Patient denies fever chills significant pain or significant drainage from bandage site.  Plan  Home health order placed for skilled nursing needs and wound care and also physical therapy  - Referral to Home Health    2. Chronic systolic CHF (congestive heart failure) (HCC)  Chronic, stable  Chronic systolic CHF improving  Last echo as per HPI  History of CHF since 07/2022 improved with control of blood pressure.  Etiology unclear  Plan  Furosemide was refilled today  - furosemide (LASIX) 20 MG Tab; Take 1 Tablet by mouth 2 times a day.  Dispense: 180 Tablet; Refill: 3    3. Primary " hypertension  Chronic, stable  Discussed with patient the need for secondary work-up of hypertension as he is currently on amlodipine 10 mg daily, carvedilol 12.5 mg twice daily, furosemide 20 mg twice daily, Imdur 30 mg daily, lisinopril 20 mg twice daily, spironolactone 25 mg daily tolerating without adverse effect and with good control of blood pressure.  Blood pressure is 132/86.  - Patient defers secondary work-up such as imaging and lab work as he is concerned about financial aspect.  Plan  Given blood pressure is well controlled at this time we will continue current management and defer secondary work-up to the future    4. Morbid obesity with BMI of 40.0-44.9, adult (HCC)  Chronic, stable  Previous to his left ankle injury he has been watching his diet and has been exercising daily however due to injury that this has acutely worsened as he is unable to get to the grocery.  - Patient identified as having weight management issue.  Appropriate orders and counseling given.    5. Routine screening for STI (sexually transmitted infection)    - Chlamydia/GC, PCR (Urine); Future  - T.PALLIDUM AB KASHIF (SCREENING); Future  - HIV AG/AB COMBO ASSAY SCREENING; Future      Return in about 3 months (around 5/13/2023) for chronic conditions .    Please note that this dictation was created using voice recognition software. I have made every reasonable attempt to correct obvious errors, but I expect that there are errors of grammar and possibly content that I did not discover before finalizing the note.

## 2023-03-12 ENCOUNTER — APPOINTMENT (OUTPATIENT)
Dept: RADIOLOGY | Facility: MEDICAL CENTER | Age: 48
DRG: 857 | End: 2023-03-12
Attending: EMERGENCY MEDICINE
Payer: COMMERCIAL

## 2023-03-12 ENCOUNTER — HOSPITAL ENCOUNTER (INPATIENT)
Facility: MEDICAL CENTER | Age: 48
LOS: 6 days | DRG: 857 | End: 2023-03-18
Attending: EMERGENCY MEDICINE | Admitting: HOSPITALIST
Payer: COMMERCIAL

## 2023-03-12 DIAGNOSIS — T14.8XXA WOUND INFECTION: ICD-10-CM

## 2023-03-12 DIAGNOSIS — I50.22 CHRONIC SYSTOLIC CHF (CONGESTIVE HEART FAILURE) (HCC): ICD-10-CM

## 2023-03-12 DIAGNOSIS — S91.002A ANKLE WOUND, LEFT, INITIAL ENCOUNTER: ICD-10-CM

## 2023-03-12 DIAGNOSIS — L08.9 WOUND INFECTION: ICD-10-CM

## 2023-03-12 DIAGNOSIS — T81.49XA POSTOPERATIVE WOUND INFECTION: ICD-10-CM

## 2023-03-12 PROBLEM — E66.01 MORBID OBESITY WITH BMI OF 40.0-44.9, ADULT (HCC): Status: RESOLVED | Noted: 2023-02-13 | Resolved: 2023-03-12

## 2023-03-12 LAB
ANION GAP SERPL CALC-SCNC: 10 MMOL/L (ref 7–16)
BASOPHILS # BLD AUTO: 0.7 % (ref 0–1.8)
BASOPHILS # BLD: 0.05 K/UL (ref 0–0.12)
BUN SERPL-MCNC: 16 MG/DL (ref 8–22)
CALCIUM SERPL-MCNC: 9.4 MG/DL (ref 8.5–10.5)
CHLORIDE SERPL-SCNC: 105 MMOL/L (ref 96–112)
CO2 SERPL-SCNC: 28 MMOL/L (ref 20–33)
CREAT SERPL-MCNC: 1.02 MG/DL (ref 0.5–1.4)
CRP SERPL HS-MCNC: 1.49 MG/DL (ref 0–0.75)
EKG IMPRESSION: NORMAL
EOSINOPHIL # BLD AUTO: 0.28 K/UL (ref 0–0.51)
EOSINOPHIL NFR BLD: 4 % (ref 0–6.9)
ERYTHROCYTE [DISTWIDTH] IN BLOOD BY AUTOMATED COUNT: 42.2 FL (ref 35.9–50)
ERYTHROCYTE [SEDIMENTATION RATE] IN BLOOD BY WESTERGREN METHOD: 2 MM/HOUR (ref 0–20)
GFR SERPLBLD CREATININE-BSD FMLA CKD-EPI: 91 ML/MIN/1.73 M 2
GLUCOSE SERPL-MCNC: 103 MG/DL (ref 65–99)
GRAM STN SPEC: NORMAL
HCT VFR BLD AUTO: 50.5 % (ref 42–52)
HGB BLD-MCNC: 17 G/DL (ref 14–18)
IMM GRANULOCYTES # BLD AUTO: 0.06 K/UL (ref 0–0.11)
IMM GRANULOCYTES NFR BLD AUTO: 0.9 % (ref 0–0.9)
LACTATE SERPL-SCNC: 0.9 MMOL/L (ref 0.5–2)
LYMPHOCYTES # BLD AUTO: 1.77 K/UL (ref 1–4.8)
LYMPHOCYTES NFR BLD: 25.4 % (ref 22–41)
MCH RBC QN AUTO: 28 PG (ref 27–33)
MCHC RBC AUTO-ENTMCNC: 33.7 G/DL (ref 33.7–35.3)
MCV RBC AUTO: 83.1 FL (ref 81.4–97.8)
MONOCYTES # BLD AUTO: 0.53 K/UL (ref 0–0.85)
MONOCYTES NFR BLD AUTO: 7.6 % (ref 0–13.4)
NEUTROPHILS # BLD AUTO: 4.29 K/UL (ref 1.82–7.42)
NEUTROPHILS NFR BLD: 61.4 % (ref 44–72)
NRBC # BLD AUTO: 0 K/UL
NRBC BLD-RTO: 0 /100 WBC
PLATELET # BLD AUTO: 232 K/UL (ref 164–446)
PMV BLD AUTO: 9.9 FL (ref 9–12.9)
POTASSIUM SERPL-SCNC: 3.6 MMOL/L (ref 3.6–5.5)
RBC # BLD AUTO: 6.08 M/UL (ref 4.7–6.1)
SIGNIFICANT IND 70042: NORMAL
SITE SITE: NORMAL
SODIUM SERPL-SCNC: 143 MMOL/L (ref 135–145)
SOURCE SOURCE: NORMAL
WBC # BLD AUTO: 7 K/UL (ref 4.8–10.8)

## 2023-03-12 PROCEDURE — 85025 COMPLETE CBC W/AUTO DIFF WBC: CPT

## 2023-03-12 PROCEDURE — 96365 THER/PROPH/DIAG IV INF INIT: CPT

## 2023-03-12 PROCEDURE — 73610 X-RAY EXAM OF ANKLE: CPT | Mod: LT

## 2023-03-12 PROCEDURE — 36415 COLL VENOUS BLD VENIPUNCTURE: CPT

## 2023-03-12 PROCEDURE — 99223 1ST HOSP IP/OBS HIGH 75: CPT | Performed by: HOSPITALIST

## 2023-03-12 PROCEDURE — 96375 TX/PRO/DX INJ NEW DRUG ADDON: CPT

## 2023-03-12 PROCEDURE — A9270 NON-COVERED ITEM OR SERVICE: HCPCS | Performed by: HOSPITALIST

## 2023-03-12 PROCEDURE — 80048 BASIC METABOLIC PNL TOTAL CA: CPT

## 2023-03-12 PROCEDURE — A9270 NON-COVERED ITEM OR SERVICE: HCPCS | Performed by: EMERGENCY MEDICINE

## 2023-03-12 PROCEDURE — 700105 HCHG RX REV CODE 258: Performed by: HOSPITALIST

## 2023-03-12 PROCEDURE — 96366 THER/PROPH/DIAG IV INF ADDON: CPT

## 2023-03-12 PROCEDURE — 85652 RBC SED RATE AUTOMATED: CPT

## 2023-03-12 PROCEDURE — 700102 HCHG RX REV CODE 250 W/ 637 OVERRIDE(OP): Performed by: HOSPITALIST

## 2023-03-12 PROCEDURE — 93005 ELECTROCARDIOGRAM TRACING: CPT

## 2023-03-12 PROCEDURE — 99222 1ST HOSP IP/OBS MODERATE 55: CPT | Mod: 24,57 | Performed by: SURGERY

## 2023-03-12 PROCEDURE — 93005 ELECTROCARDIOGRAM TRACING: CPT | Performed by: EMERGENCY MEDICINE

## 2023-03-12 PROCEDURE — 770001 HCHG ROOM/CARE - MED/SURG/GYN PRIV*

## 2023-03-12 PROCEDURE — 700105 HCHG RX REV CODE 258: Performed by: EMERGENCY MEDICINE

## 2023-03-12 PROCEDURE — 83605 ASSAY OF LACTIC ACID: CPT

## 2023-03-12 PROCEDURE — 87070 CULTURE OTHR SPECIMN AEROBIC: CPT

## 2023-03-12 PROCEDURE — 700111 HCHG RX REV CODE 636 W/ 250 OVERRIDE (IP): Performed by: EMERGENCY MEDICINE

## 2023-03-12 PROCEDURE — 700111 HCHG RX REV CODE 636 W/ 250 OVERRIDE (IP): Performed by: HOSPITALIST

## 2023-03-12 PROCEDURE — 700102 HCHG RX REV CODE 250 W/ 637 OVERRIDE(OP): Performed by: EMERGENCY MEDICINE

## 2023-03-12 PROCEDURE — 87205 SMEAR GRAM STAIN: CPT

## 2023-03-12 PROCEDURE — 86140 C-REACTIVE PROTEIN: CPT

## 2023-03-12 PROCEDURE — 99285 EMERGENCY DEPT VISIT HI MDM: CPT

## 2023-03-12 PROCEDURE — 87040 BLOOD CULTURE FOR BACTERIA: CPT

## 2023-03-12 RX ORDER — AMOXICILLIN 250 MG
2 CAPSULE ORAL 2 TIMES DAILY
Status: DISCONTINUED | OUTPATIENT
Start: 2023-03-12 | End: 2023-03-18 | Stop reason: HOSPADM

## 2023-03-12 RX ORDER — DOXYCYCLINE 100 MG/1
100 TABLET ORAL EVERY 12 HOURS
Status: DISCONTINUED | OUTPATIENT
Start: 2023-03-12 | End: 2023-03-13

## 2023-03-12 RX ORDER — BISACODYL 10 MG
10 SUPPOSITORY, RECTAL RECTAL
Status: DISCONTINUED | OUTPATIENT
Start: 2023-03-12 | End: 2023-03-18 | Stop reason: HOSPADM

## 2023-03-12 RX ORDER — ONDANSETRON 4 MG/1
4 TABLET, ORALLY DISINTEGRATING ORAL EVERY 4 HOURS PRN
Status: DISCONTINUED | OUTPATIENT
Start: 2023-03-12 | End: 2023-03-18 | Stop reason: HOSPADM

## 2023-03-12 RX ORDER — NICOTINE 21 MG/24HR
21 PATCH, TRANSDERMAL 24 HOURS TRANSDERMAL
Status: DISCONTINUED | OUTPATIENT
Start: 2023-03-12 | End: 2023-03-18 | Stop reason: HOSPADM

## 2023-03-12 RX ORDER — PROMETHAZINE HYDROCHLORIDE 25 MG/1
12.5-25 SUPPOSITORY RECTAL EVERY 4 HOURS PRN
Status: DISCONTINUED | OUTPATIENT
Start: 2023-03-12 | End: 2023-03-18 | Stop reason: HOSPADM

## 2023-03-12 RX ORDER — POLYETHYLENE GLYCOL 3350 17 G/17G
1 POWDER, FOR SOLUTION ORAL
Status: DISCONTINUED | OUTPATIENT
Start: 2023-03-12 | End: 2023-03-18 | Stop reason: HOSPADM

## 2023-03-12 RX ORDER — AMLODIPINE BESYLATE 5 MG/1
10 TABLET ORAL ONCE
Status: COMPLETED | OUTPATIENT
Start: 2023-03-12 | End: 2023-03-12

## 2023-03-12 RX ORDER — ISOSORBIDE MONONITRATE 30 MG/1
30 TABLET, EXTENDED RELEASE ORAL ONCE
Status: COMPLETED | OUTPATIENT
Start: 2023-03-12 | End: 2023-03-12

## 2023-03-12 RX ORDER — ONDANSETRON 2 MG/ML
4 INJECTION INTRAMUSCULAR; INTRAVENOUS EVERY 4 HOURS PRN
Status: DISCONTINUED | OUTPATIENT
Start: 2023-03-12 | End: 2023-03-18 | Stop reason: HOSPADM

## 2023-03-12 RX ORDER — ISOSORBIDE MONONITRATE 30 MG/1
30 TABLET, EXTENDED RELEASE ORAL DAILY
Status: DISCONTINUED | OUTPATIENT
Start: 2023-03-12 | End: 2023-03-12

## 2023-03-12 RX ORDER — OXYCODONE HYDROCHLORIDE 5 MG/1
5-10 TABLET ORAL EVERY 4 HOURS PRN
Status: DISCONTINUED | OUTPATIENT
Start: 2023-03-12 | End: 2023-03-18 | Stop reason: HOSPADM

## 2023-03-12 RX ORDER — PROMETHAZINE HYDROCHLORIDE 25 MG/1
12.5-25 TABLET ORAL EVERY 4 HOURS PRN
Status: DISCONTINUED | OUTPATIENT
Start: 2023-03-12 | End: 2023-03-18 | Stop reason: HOSPADM

## 2023-03-12 RX ORDER — ATORVASTATIN CALCIUM 10 MG/1
10 TABLET, FILM COATED ORAL NIGHTLY
Status: DISCONTINUED | OUTPATIENT
Start: 2023-03-12 | End: 2023-03-18 | Stop reason: HOSPADM

## 2023-03-12 RX ORDER — PROCHLORPERAZINE EDISYLATE 5 MG/ML
5-10 INJECTION INTRAMUSCULAR; INTRAVENOUS EVERY 4 HOURS PRN
Status: DISCONTINUED | OUTPATIENT
Start: 2023-03-12 | End: 2023-03-18 | Stop reason: HOSPADM

## 2023-03-12 RX ORDER — SPIRONOLACTONE 25 MG/1
25 TABLET ORAL DAILY
Status: DISCONTINUED | OUTPATIENT
Start: 2023-03-12 | End: 2023-03-17

## 2023-03-12 RX ORDER — CEFTRIAXONE 2 G/1
2000 INJECTION, POWDER, FOR SOLUTION INTRAMUSCULAR; INTRAVENOUS ONCE
Status: COMPLETED | OUTPATIENT
Start: 2023-03-12 | End: 2023-03-12

## 2023-03-12 RX ORDER — AMLODIPINE BESYLATE 10 MG/1
10 TABLET ORAL DAILY
Status: DISCONTINUED | OUTPATIENT
Start: 2023-03-13 | End: 2023-03-13

## 2023-03-12 RX ORDER — ISOSORBIDE MONONITRATE 30 MG/1
30 TABLET, EXTENDED RELEASE ORAL DAILY
Status: DISCONTINUED | OUTPATIENT
Start: 2023-03-13 | End: 2023-03-13

## 2023-03-12 RX ORDER — ACETAMINOPHEN 325 MG/1
650 TABLET ORAL EVERY 6 HOURS PRN
Status: DISCONTINUED | OUTPATIENT
Start: 2023-03-12 | End: 2023-03-17

## 2023-03-12 RX ORDER — AMLODIPINE BESYLATE 10 MG/1
10 TABLET ORAL DAILY
Status: DISCONTINUED | OUTPATIENT
Start: 2023-03-12 | End: 2023-03-12

## 2023-03-12 RX ADMIN — AMLODIPINE BESYLATE 10 MG: 5 TABLET ORAL at 08:08

## 2023-03-12 RX ADMIN — VANCOMYCIN HYDROCHLORIDE 3 G: 500 INJECTION, POWDER, LYOPHILIZED, FOR SOLUTION INTRAVENOUS at 09:37

## 2023-03-12 RX ADMIN — ISOSORBIDE MONONITRATE 30 MG: 30 TABLET, EXTENDED RELEASE ORAL at 08:59

## 2023-03-12 RX ADMIN — CEFTRIAXONE SODIUM 2000 MG: 2 INJECTION, POWDER, FOR SOLUTION INTRAMUSCULAR; INTRAVENOUS at 08:17

## 2023-03-12 RX ADMIN — DOXYCYCLINE 100 MG: 100 TABLET, FILM COATED ORAL at 17:19

## 2023-03-12 RX ADMIN — OXYCODONE HYDROCHLORIDE 10 MG: 5 TABLET ORAL at 20:05

## 2023-03-12 RX ADMIN — ACETAMINOPHEN 650 MG: 325 TABLET, FILM COATED ORAL at 20:11

## 2023-03-12 RX ADMIN — AMPICILLIN AND SULBACTAM 3 G: 1; 2 INJECTION, POWDER, FOR SOLUTION INTRAMUSCULAR; INTRAVENOUS at 13:54

## 2023-03-12 RX ADMIN — ACETAMINOPHEN 650 MG: 325 TABLET, FILM COATED ORAL at 13:52

## 2023-03-12 RX ADMIN — AMPICILLIN AND SULBACTAM 3 G: 1; 2 INJECTION, POWDER, FOR SOLUTION INTRAMUSCULAR; INTRAVENOUS at 20:09

## 2023-03-12 RX ADMIN — ATORVASTATIN CALCIUM 10 MG: 10 TABLET, FILM COATED ORAL at 20:06

## 2023-03-12 RX ADMIN — SPIRONOLACTONE 25 MG: 25 TABLET ORAL at 13:53

## 2023-03-12 ASSESSMENT — LIFESTYLE VARIABLES
TOTAL SCORE: 0
ON A TYPICAL DAY WHEN YOU DRINK ALCOHOL HOW MANY DRINKS DO YOU HAVE: 1
EVER HAD A DRINK FIRST THING IN THE MORNING TO STEADY YOUR NERVES TO GET RID OF A HANGOVER: NO
HAVE YOU EVER FELT YOU SHOULD CUT DOWN ON YOUR DRINKING: NO
DOES PATIENT WANT TO STOP DRINKING: NO
HOW MANY TIMES IN THE PAST YEAR HAVE YOU HAD 5 OR MORE DRINKS IN A DAY: 0
TOTAL SCORE: 0
EVER FELT BAD OR GUILTY ABOUT YOUR DRINKING: NO
TOTAL SCORE: 0
CONSUMPTION TOTAL: NEGATIVE
HAVE PEOPLE ANNOYED YOU BY CRITICIZING YOUR DRINKING: NO
ALCOHOL_USE: YES
AVERAGE NUMBER OF DAYS PER WEEK YOU HAVE A DRINK CONTAINING ALCOHOL: 1

## 2023-03-12 ASSESSMENT — COGNITIVE AND FUNCTIONAL STATUS - GENERAL
PERSONAL GROOMING: A LITTLE
SUGGESTED CMS G CODE MODIFIER DAILY ACTIVITY: CK
DAILY ACTIVITIY SCORE: 18
DRESSING REGULAR UPPER BODY CLOTHING: A LITTLE
SUGGESTED CMS G CODE MODIFIER MOBILITY: CK
EATING MEALS: A LITTLE
CLIMB 3 TO 5 STEPS WITH RAILING: A LITTLE
DRESSING REGULAR LOWER BODY CLOTHING: A LITTLE
TURNING FROM BACK TO SIDE WHILE IN FLAT BAD: A LITTLE
MOVING FROM LYING ON BACK TO SITTING ON SIDE OF FLAT BED: A LITTLE
TOILETING: A LITTLE
HELP NEEDED FOR BATHING: A LITTLE
MOBILITY SCORE: 18
WALKING IN HOSPITAL ROOM: A LITTLE
MOVING TO AND FROM BED TO CHAIR: A LITTLE
STANDING UP FROM CHAIR USING ARMS: A LITTLE

## 2023-03-12 ASSESSMENT — ENCOUNTER SYMPTOMS
CHILLS: 0
SHORTNESS OF BREATH: 0
FEVER: 0

## 2023-03-12 ASSESSMENT — FIBROSIS 4 INDEX
FIB4 SCORE: 0.93
FIB4 SCORE: 1.22

## 2023-03-12 ASSESSMENT — PATIENT HEALTH QUESTIONNAIRE - PHQ9
1. LITTLE INTEREST OR PLEASURE IN DOING THINGS: NOT AT ALL
2. FEELING DOWN, DEPRESSED, IRRITABLE, OR HOPELESS: NOT AT ALL
SUM OF ALL RESPONSES TO PHQ9 QUESTIONS 1 AND 2: 0

## 2023-03-12 NOTE — ED NOTES
Taken patient from triage waiting room,alert x 4.Verified patient identification.  Assumed patient care. Placed on patient room. Changed clothes to hospital gown. Connected to cardiac monitor. Will continue to monitor for any complications.

## 2023-03-12 NOTE — ED NOTES
Pt resting comfortably with even chest rise and fall, reports no needs at this time, call light available and in reach.

## 2023-03-12 NOTE — ED NOTES
Pt resting comfortably, alert, with even chest rise and fall, reports no needs at this time, call light available and in reach.

## 2023-03-12 NOTE — ASSESSMENT & PLAN NOTE
Cessation again discussed  Adverse effect of smoking including CAD, COPD, CVA, lung cancer Has been explained to the patient; he stated understanding  Provide nicotine patch  More than 3 min were spent in counselling

## 2023-03-12 NOTE — PROGRESS NOTES
Pt c/o HA on arrival. Also asking if he is having surgery today or if he can eat. Notified Dr. Conteh- new orders placed for pain medication and diet order, NPO at midnight.

## 2023-03-12 NOTE — ASSESSMENT & PLAN NOTE
Blood pressure improving, continue  1.Losartan 100 mg po   2. Imdur 60 mg p.o. daily  3. Coreg 25 mg p.o. twice john  4., amlodipine 10 mg p.o. daily  5.  Hydralazine 100 tid  6. amlodipine 10 mg p.o. daily  7. Aldactone 25 mg p.o. daily  Clinidine for SBP > 160    Along with as needed antihypertensive medication  CTA renal artert normal

## 2023-03-12 NOTE — ED NOTES
Report received from LUIS Schreiber. Patient alert resting comfortably in bed. Reports no pain at this time.

## 2023-03-12 NOTE — ED TRIAGE NOTES
Chief Complaint   Patient presents with    Wound Check     Pt had ankle surgery to L ankle 5 weeks ago. Now presents with tenderness, swelling, pus, poor healing to surgical wound     Pt presents in wc to triage for poor wound healing following ankle surgery. Periwound is purple, swollen, and tender. Tachy to 110 and hypertensive to 184/168 (states he hasn't taken his BP meds in a few days)

## 2023-03-12 NOTE — ASSESSMENT & PLAN NOTE
Last echo Feb had an EF 40%  Without exacerbation  Continue lisinopril 20 mg p.o. daily, Imdur 60 mg.,  Coreg 25 mg p.o. twice daily, Lasix 20 mg p.o. twice daily, Aldactone 25 mg p.o. daily  I/os, cardiac diet

## 2023-03-12 NOTE — ASSESSMENT & PLAN NOTE
Per patient he has had blood and purulent drainage from the lateral ankle surgical site.   WBC normal with elevated CRP  IV antibiotics and pain control  Dr. Chance brooke was called from the ER      0n 3/14: Patient underwent surgery, will await culture, ID following, continue IV antibiotics as per infectious disease recommendation  Intra-op cx  Growing proteus and enterococcus fecalis    -- on dapto+ cefepiem

## 2023-03-12 NOTE — H&P
Hospital Medicine History & Physical Note    Date of Service  3/12/2023    Primary Care Physician  Juan Davalos M.D.    Consultants  orthopedics    Specialist Names: Dr. Qureshi was called from the ER    Code Status  Full Code    Chief Complaint  Chief Complaint   Patient presents with    Wound Check     Pt had ankle surgery to L ankle 5 weeks ago. Now presents with tenderness, swelling, pus, poor healing to surgical wound       History of Presenting Illness  Gage Garcia is a 47 y.o. male who presented 3/12/2023 with left ankle post-operative infection.  Mr. Garcia has a past medical history of hypertension, pseudogout, cardiomyopathy with ejection fraction 40% unclear etiology that was recently admitted here from 2/4/2023 through 2/9/2023 with open ankle fracture for which she eventually went to the operating room with Dr. Persaud on 2/4/2023 and underwent irrigation and debridement with internal fixation.  He presented to the [facility] office and had the stitches out after 16 days.  Subsequently he has had drainage from the left ankle lateral site with blood and pus.  He has not had a fever.  Orthopedics was called from emergency room recommended inpatient status with IV antibiotics and n.p.o. at midnight for possible surgery in the morning.  In the emergency room his blood pressure was up to 184 systolic upon questioning patient states he forgot to take his blood pressure medicines for the past few days.    I discussed the plan of care with Dr. Arnold.    Review of Systems  Review of Systems   Constitutional:  Negative for chills and fever.   Respiratory:  Negative for shortness of breath.    Cardiovascular:  Negative for chest pain.   All other systems reviewed and are negative.    Past Medical History   has a past medical history of Hypertension, Obesity, Smoker, and Strep pharyngitis.cardiomyopath EF 40%    Surgical History   has a past surgical history that includes orif, ankle (Left, 2/4/2023).     Family  History  family history includes Diabetes in his mother; Heart Disease in his mother.   Family history reviewed with patient. There is no family history that is pertinent to the chief complaint.     Social History   reports that he has been smoking cigarettes. He has a 33.00 pack-year smoking history. He has never used smokeless tobacco. He reports that he does not currently use alcohol. He reports that he does not currently use drugs after having used the following drugs: Inhaled and Marijuana.    Allergies  No Known Allergies    Medications  Prior to Admission Medications   Prescriptions Last Dose Informant Patient Reported? Taking?   amLODIPine (NORVASC) 10 MG Tab 3/8/2023 Patient, Patient's Home Pharmacy No No   Sig: Take 1 Tablet by mouth every day for 30 days.   atorvastatin (LIPITOR) 10 MG Tab 3/8/2023 Patient's Home Pharmacy, Patient Yes No   Sig: Take 10 mg by mouth every evening.   furosemide (LASIX) 20 MG Tab 3/8/2023 Patient, Patient's Home Pharmacy No No   Sig: Take 1 Tablet by mouth 2 times a day.   isosorbide mononitrate SR (IMDUR) 30 MG TABLET SR 24 HR 3/8/2023 Patient, Patient's Home Pharmacy No No   Sig: Take 1 Tablet by mouth every day for 30 days.   spironolactone (ALDACTONE) 25 MG Tab 3/8/2023 Patient, Patient's Home Pharmacy No No   Sig: Take 1 Tablet by mouth every day for 30 days.      Facility-Administered Medications: None       Physical Exam  Temp:  [36.1 °C (97 °F)] 36.1 °C (97 °F)  Pulse:  [] 93  Resp:  [18] 18  BP: (166-199)/() 176/100  SpO2:  [93 %-97 %] 94 %  Blood Pressure: (!) 176/100   Temperature: 36.1 °C (97 °F)   Pulse: 93   Respiration: 18   Pulse Oximetry: 94 %       Physical Exam  Vitals and nursing note reviewed.   Constitutional:       General: He is not in acute distress.     Appearance: He is not toxic-appearing.      Comments: Very tall stature    HENT:      Head: Normocephalic and atraumatic.      Mouth/Throat:      Mouth: Mucous membranes are dry.       Pharynx: Oropharynx is clear.   Eyes:      General: No scleral icterus.     Conjunctiva/sclera: Conjunctivae normal.   Cardiovascular:      Rate and Rhythm: Normal rate and regular rhythm.      Heart sounds: No murmur heard.  Pulmonary:      Effort: Pulmonary effort is normal.      Breath sounds: Normal breath sounds.   Abdominal:      General: There is no distension.      Tenderness: There is no abdominal tenderness.   Musculoskeletal:      Cervical back: Normal range of motion and neck supple.      Comments: Left foot is swollen without redness  Left medial ankle surgical site clear without infection  The left lateral ankle surgical site has scabbed without drainage. + tenderness around the site.    Skin:     General: Skin is warm and dry.   Neurological:      General: No focal deficit present.      Mental Status: He is alert and oriented to person, place, and time.   Psychiatric:         Mood and Affect: Mood normal.         Behavior: Behavior normal.         Thought Content: Thought content normal.         Judgment: Judgment normal.       Laboratory:  Recent Labs     03/12/23  0654   WBC 7.0   RBC 6.08   HEMOGLOBIN 17.0   HEMATOCRIT 50.5   MCV 83.1   MCH 28.0   MCHC 33.7   RDW 42.2   PLATELETCT 232   MPV 9.9     Recent Labs     03/12/23  0654   SODIUM 143   POTASSIUM 3.6   CHLORIDE 105   CO2 28   GLUCOSE 103*   BUN 16   CREATININE 1.02   CALCIUM 9.4     Recent Labs     03/12/23  0654   GLUCOSE 103*         No results for input(s): NTPROBNP in the last 72 hours.      No results for input(s): TROPONINT in the last 72 hours.    Imaging:  DX-ANKLE 3+ VIEWS LEFT   Final Result         1.  Postoperative changes of distal fibular ORIF.   2.  Small bony fragments adjacent to the medial malleolus and small posterior malleolus fracture fragment are seen likely from prior injury.           EKG reviewed and unchanged from prior      Assessment/Plan:  Justification for Admission Status  I anticipate this patient will require  at least two midnights for appropriate medical management, necessitating inpatient admission because IV antibiotics with possible surgery    Patient will need a Med/Surg bed on MEDICAL service .  The need is secondary to as above.    * Postoperative wound infection- (present on admission)  Assessment & Plan  Per patient he has had blood and purulent drainage from the lateral ankle surgical site.   WBC normal with elevated CRP  IV antibiotics and pain control  Dr. Chance brooke was called from the ER and recommends NPO midnight for possible surgery.    Dependence on nicotine from cigarettes- (present on admission)  Assessment & Plan  Cessation again discussed  He notes that he has been very stressed and hopes to quit  Nicotine patch    Pseudogout- (present on admission)  Assessment & Plan  Hx of    Chronic systolic CHF (congestive heart failure) (HCC)- (present on admission)  Assessment & Plan  Last echo Feb had an EF 40%  Without exacerbation  He has been on Lasix and aldactone but not a beta blocker      Hypertension- (present on admission)  Assessment & Plan  He is on isosorbide, lasix, norvasc, and aldactone though missed his meds for the past few days and has an elevated blood pressure of 170  Home meds will be restarted.         VTE prophylaxis: SCDs/TEDs

## 2023-03-12 NOTE — ED PROVIDER NOTES
ED Provider Note    CHIEF COMPLAINT  Chief Complaint   Patient presents with    Wound Check     Pt had ankle surgery to L ankle 5 weeks ago. Now presents with tenderness, swelling, pus, poor healing to surgical wound       EXTERNAL RECORDS REVIEWED  Inpatient Notes discharge summary from 2/20/2023 reviewed.  Patient was admitted on 2/4/2023 for an open ankle fracture.  He underwent ORIF with irrigation debridement on 2/4.  Advised to be toe-touch weightbearing.    HPI/ROS  LIMITATION TO HISTORY   Select: : None  OUTSIDE HISTORIAN(S):  none    Gage Garcia is a 47 y.o. male smoker with CHF, hypertension, status post ORIF of the left ankle 6 weeks ago who presents for concern of left ankle wound infection.    When he went for his suture removal/postop visit 2 weeks following his surgery, he noticed yellow drainage from the wound.  He was told this was normal.  Steri-Strips were applied and he was told these would come off by themselves in 10 days or so.  He has noted increased swelling and redness of the wound area since his postop visit.  When the Steri-Strips came off a couple of days ago, he noticed persistent drainage and believe there was infection.  He called the office and was told to come in on Monday for a recheck.  Patient was concerned that situation is worsening and had been told to go to the ER if this was the case.    Patient has not taken his antihypertensives in several days.    Patient denies fever, trauma, history of diabetes, calf pain, chest pain, shortness of breath.    PAST MEDICAL HISTORY   has a past medical history of Hypertension, Obesity, Smoker, and Strep pharyngitis.    SURGICAL HISTORY   has a past surgical history that includes orif, ankle (Left, 2/4/2023).    FAMILY HISTORY  Family History   Problem Relation Age of Onset    Heart Disease Mother     Diabetes Mother     Ovarian Cancer Neg Hx     Tubal Cancer Neg Hx     Breast Cancer Neg Hx     Colorectal Cancer Neg Hx     Peritoneal Cancer  "Neg Hx        SOCIAL HISTORY  Social History     Tobacco Use    Smoking status: Every Day     Packs/day: 1.50     Years: 22.00     Pack years: 33.00     Types: Cigarettes    Smokeless tobacco: Never   Vaping Use    Vaping Use: Never used   Substance and Sexual Activity    Alcohol use: Not Currently     Comment: occ    Drug use: Not Currently     Types: Inhaled, Marijuana     Comment: thc    Sexual activity: Yes       CURRENT MEDICATIONS  Home Medications       Reviewed by Michael Valadez (Pharmacy Tech) on 03/12/23 at 0953  Med List Status: Complete     Medication Last Dose Status   amLODIPine (NORVASC) 10 MG Tab 3/8/2023 Active   atorvastatin (LIPITOR) 10 MG Tab 3/8/2023 Active   furosemide (LASIX) 20 MG Tab 3/8/2023 Active   isosorbide mononitrate SR (IMDUR) 30 MG TABLET SR 24 HR 3/8/2023 Active   spironolactone (ALDACTONE) 25 MG Tab 3/8/2023 Active                    ALLERGIES  No Known Allergies    PHYSICAL EXAM  VITAL SIGNS: BP (!) 199/115   Pulse 99   Temp 36.1 °C (97 °F) (Temporal)   Resp 18   Ht 2.083 m (6' 10\")   Wt (!) 163 kg (360 lb)   SpO2 94%   BMI 37.64 kg/m²    General:  WDWN male with elevated BMI, nontoxic appearing in NAD; A+Ox3; V/S as above; elevated blood pressure, afebrile  Skin: warm and dry; good color; no rash  HEENT: NCAT; EOMs intact; PERRL; no scleral icterus   Neck: FROM  Cardiovascular: Regular heart rate and rhythm.  No murmurs, rubs, or gallops; pulses 2+ bilaterally radially and DP areas  Lungs: No respiratory distress or tachypnea; Clear to auscultation with good air movement bilaterally.  No wheezes, rhonchi, or rales.   Abdomen: BS present; soft; NTND; no rebound, guarding, or rigidity.  No organomegaly or pulsatile mass  Extremities: MARTIN x 4; left ankle is generally edematous along with the left foot; dehiscence of the lateral surgical incision is noted with yellow drainage and crusting with mild surrounding erythema; no streaking, no crepitus; medial wound on the " left ankle is scabbed with peeling skin; neg Robi's  Neurologic: CNs III-XII grossly intact; speech clear; distal sensation intact; strength 5/5 UE/LEs  Psychiatric: Appropriate affect, normal mood      DIAGNOSTIC STUDIES / PROCEDURES  LABS  Results for orders placed or performed during the hospital encounter of 03/12/23   CBC WITH DIFFERENTIAL   Result Value Ref Range    WBC 7.0 4.8 - 10.8 K/uL    RBC 6.08 4.70 - 6.10 M/uL    Hemoglobin 17.0 14.0 - 18.0 g/dL    Hematocrit 50.5 42.0 - 52.0 %    MCV 83.1 81.4 - 97.8 fL    MCH 28.0 27.0 - 33.0 pg    MCHC 33.7 33.7 - 35.3 g/dL    RDW 42.2 35.9 - 50.0 fL    Platelet Count 232 164 - 446 K/uL    MPV 9.9 9.0 - 12.9 fL    Neutrophils-Polys 61.40 44.00 - 72.00 %    Lymphocytes 25.40 22.00 - 41.00 %    Monocytes 7.60 0.00 - 13.40 %    Eosinophils 4.00 0.00 - 6.90 %    Basophils 0.70 0.00 - 1.80 %    Immature Granulocytes 0.90 0.00 - 0.90 %    Nucleated RBC 0.00 /100 WBC    Neutrophils (Absolute) 4.29 1.82 - 7.42 K/uL    Lymphs (Absolute) 1.77 1.00 - 4.80 K/uL    Monos (Absolute) 0.53 0.00 - 0.85 K/uL    Eos (Absolute) 0.28 0.00 - 0.51 K/uL    Baso (Absolute) 0.05 0.00 - 0.12 K/uL    Immature Granulocytes (abs) 0.06 0.00 - 0.11 K/uL    NRBC (Absolute) 0.00 K/uL   Basic Metabolic Panel   Result Value Ref Range    Sodium 143 135 - 145 mmol/L    Potassium 3.6 3.6 - 5.5 mmol/L    Chloride 105 96 - 112 mmol/L    Co2 28 20 - 33 mmol/L    Glucose 103 (H) 65 - 99 mg/dL    Bun 16 8 - 22 mg/dL    Creatinine 1.02 0.50 - 1.40 mg/dL    Calcium 9.4 8.5 - 10.5 mg/dL    Anion Gap 10.0 7.0 - 16.0   CRP QUANTITIVE (NON-CARDIAC)   Result Value Ref Range    Stat C-Reactive Protein 1.49 (H) 0.00 - 0.75 mg/dL   LACTIC ACID   Result Value Ref Range    Lactic Acid 0.9 0.5 - 2.0 mmol/L   ESTIMATED GFR   Result Value Ref Range    GFR (CKD-EPI) 91 >60 mL/min/1.73 m 2   EKG (NOW)   Result Value Ref Range    Report       [facility] Emergency Dept.    Test Date:  2023-03-12  Pt Name:     GEETHA BLANCO                  Department: ER  MRN:        [id number]                      Room:  Gender:     Male                         Technician: [id number]  :        1975                   Requested By:ER TRIAGE PROTOCOL  Order #:    [id number]                    Reading MD: STEVE CARDOZA MD    Measurements  Intervals                                Axis  Rate:       104                          P:          36  AZ:         174                          QRS:        -25  QRSD:       103                          T:          68  QT:         363  QTc:        478    Interpretive Statements  Sinus tachycardia  Probable left atrial enlargement  Borderline left axis deviation  Borderline prolonged QT interval  Compared to ECG 2022 21:13:31  Ectopic atrial rhythm no longer present  Electronically Signed On 3- 9:28:02 PDT by STEVE CARDOZA MD           RADIOLOGY  I have independently interpreted the diagnostic imaging associated with this visit and am waiting the final reading from the radiologist.   My preliminary interpretation is as follows: No fracture; no loosening of hardware; no subcutaneous gas    Radiologist interpretation:   DX-ANKLE 3+ VIEWS LEFT   Final Result         1.  Postoperative changes of distal fibular ORIF.   2.  Small bony fragments adjacent to the medial malleolus and small posterior malleolus fracture fragment are seen likely from prior injury.               COURSE & MEDICAL DECISION MAKING    ED Observation Status? No; Patient does not meet criteria for ED Observation.     INITIAL ASSESSMENT, COURSE AND PLAN  Care Narrative: 47-year-old male who presents for possible wound infection following ORIF last month.    Chart review of medications reveal patient takes Imdur and Norvasc.  He has not taken his blood pressure medications in a few days and he is hypertensive here.  These were ordered p.o. here.    Given my suspicion for wound infection, Rocephin and vancomycin were  ordered.    Patient's white blood cell count is normal, CRP is elevated, lactic acid normal, glucose 103.  Wound culture and blood cultures pending.    X-ray demonstrates no loosening of the hardware or new traumatic injury.      I do not suspect DVT.  I do not suspect necrotizing fasciitis.      HTN/IDDM FOLLOW UP:  The patient has known hypertension and is being followed by their primary care doctor     DISPOSITION AND DISCUSSIONS  I have discussed management of the patient with the following physicians and WILL's:    Dr. Nobles/ hospitalist  Dr. Fletcher/ortho    Discussion of management with other QHP or appropriate source(s):       Decision tools and prescription drugs considered including, but not limited to: Antibiotics   .    FINAL DIAGNOSIS  1. Wound infection    2. Ankle wound, left, initial encounter      Electronically signed by: Alina Arnold M.D., 3/12/2023 7:09 AM

## 2023-03-13 PROBLEM — I16.0 HYPERTENSIVE URGENCY: Status: ACTIVE | Noted: 2022-07-26

## 2023-03-13 PROCEDURE — 700102 HCHG RX REV CODE 250 W/ 637 OVERRIDE(OP): Performed by: HOSPITALIST

## 2023-03-13 PROCEDURE — 99233 SBSQ HOSP IP/OBS HIGH 50: CPT | Performed by: STUDENT IN AN ORGANIZED HEALTH CARE EDUCATION/TRAINING PROGRAM

## 2023-03-13 PROCEDURE — 99223 1ST HOSP IP/OBS HIGH 75: CPT | Performed by: INTERNAL MEDICINE

## 2023-03-13 PROCEDURE — 700105 HCHG RX REV CODE 258: Performed by: HOSPITALIST

## 2023-03-13 PROCEDURE — 700111 HCHG RX REV CODE 636 W/ 250 OVERRIDE (IP): Performed by: HOSPITALIST

## 2023-03-13 PROCEDURE — 700101 HCHG RX REV CODE 250

## 2023-03-13 PROCEDURE — A9270 NON-COVERED ITEM OR SERVICE: HCPCS | Performed by: STUDENT IN AN ORGANIZED HEALTH CARE EDUCATION/TRAINING PROGRAM

## 2023-03-13 PROCEDURE — 700102 HCHG RX REV CODE 250 W/ 637 OVERRIDE(OP): Performed by: STUDENT IN AN ORGANIZED HEALTH CARE EDUCATION/TRAINING PROGRAM

## 2023-03-13 PROCEDURE — 770001 HCHG ROOM/CARE - MED/SURG/GYN PRIV*

## 2023-03-13 PROCEDURE — A9270 NON-COVERED ITEM OR SERVICE: HCPCS | Performed by: HOSPITALIST

## 2023-03-13 RX ORDER — LABETALOL HYDROCHLORIDE 5 MG/ML
10 INJECTION, SOLUTION INTRAVENOUS EVERY 6 HOURS PRN
Status: DISCONTINUED | OUTPATIENT
Start: 2023-03-13 | End: 2023-03-13

## 2023-03-13 RX ORDER — ISOSORBIDE MONONITRATE 60 MG/1
60 TABLET, EXTENDED RELEASE ORAL DAILY
Status: DISCONTINUED | OUTPATIENT
Start: 2023-03-14 | End: 2023-03-14

## 2023-03-13 RX ORDER — ISOSORBIDE MONONITRATE 30 MG/1
30 TABLET, EXTENDED RELEASE ORAL ONCE
Status: COMPLETED | OUTPATIENT
Start: 2023-03-13 | End: 2023-03-13

## 2023-03-13 RX ORDER — LABETALOL HYDROCHLORIDE 5 MG/ML
10 INJECTION, SOLUTION INTRAVENOUS EVERY 6 HOURS PRN
Status: DISCONTINUED | OUTPATIENT
Start: 2023-03-13 | End: 2023-03-18 | Stop reason: HOSPADM

## 2023-03-13 RX ORDER — FUROSEMIDE 20 MG/1
20 TABLET ORAL 2 TIMES DAILY
Status: DISCONTINUED | OUTPATIENT
Start: 2023-03-13 | End: 2023-03-18 | Stop reason: HOSPADM

## 2023-03-13 RX ORDER — AMLODIPINE BESYLATE 10 MG/1
10 TABLET ORAL DAILY
Status: DISCONTINUED | OUTPATIENT
Start: 2023-03-13 | End: 2023-03-18 | Stop reason: HOSPADM

## 2023-03-13 RX ADMIN — OXYCODONE HYDROCHLORIDE 10 MG: 5 TABLET ORAL at 22:53

## 2023-03-13 RX ADMIN — SPIRONOLACTONE 25 MG: 25 TABLET ORAL at 04:24

## 2023-03-13 RX ADMIN — OXYCODONE HYDROCHLORIDE 10 MG: 5 TABLET ORAL at 14:08

## 2023-03-13 RX ADMIN — FUROSEMIDE 20 MG: 20 TABLET ORAL at 21:10

## 2023-03-13 RX ADMIN — ISOSORBIDE MONONITRATE 30 MG: 30 TABLET, EXTENDED RELEASE ORAL at 08:55

## 2023-03-13 RX ADMIN — ATORVASTATIN CALCIUM 10 MG: 10 TABLET, FILM COATED ORAL at 21:10

## 2023-03-13 RX ADMIN — LABETALOL HYDROCHLORIDE 10 MG: 5 INJECTION INTRAVENOUS at 16:34

## 2023-03-13 RX ADMIN — AMPICILLIN AND SULBACTAM 3 G: 1; 2 INJECTION, POWDER, FOR SOLUTION INTRAMUSCULAR; INTRAVENOUS at 08:04

## 2023-03-13 RX ADMIN — DOXYCYCLINE 100 MG: 100 TABLET, FILM COATED ORAL at 04:52

## 2023-03-13 RX ADMIN — AMLODIPINE BESYLATE 10 MG: 10 TABLET ORAL at 04:24

## 2023-03-13 RX ADMIN — ACETAMINOPHEN 650 MG: 325 TABLET, FILM COATED ORAL at 14:08

## 2023-03-13 RX ADMIN — ISOSORBIDE MONONITRATE 30 MG: 30 TABLET, EXTENDED RELEASE ORAL at 04:24

## 2023-03-13 RX ADMIN — FUROSEMIDE 20 MG: 20 TABLET ORAL at 11:58

## 2023-03-13 RX ADMIN — AMPICILLIN AND SULBACTAM 3 G: 1; 2 INJECTION, POWDER, FOR SOLUTION INTRAMUSCULAR; INTRAVENOUS at 02:20

## 2023-03-13 RX ADMIN — LABETALOL HYDROCHLORIDE 10 MG: 5 INJECTION INTRAVENOUS at 23:39

## 2023-03-13 RX ADMIN — ACETAMINOPHEN 650 MG: 325 TABLET, FILM COATED ORAL at 22:54

## 2023-03-13 RX ADMIN — AMPICILLIN AND SULBACTAM 3 G: 1; 2 INJECTION, POWDER, FOR SOLUTION INTRAMUSCULAR; INTRAVENOUS at 14:21

## 2023-03-13 RX ADMIN — LABETALOL HYDROCHLORIDE 10 MG: 5 INJECTION, SOLUTION INTRAVENOUS at 05:51

## 2023-03-13 RX ADMIN — OXYCODONE HYDROCHLORIDE 10 MG: 5 TABLET ORAL at 05:26

## 2023-03-13 RX ADMIN — DOCUSATE SODIUM 50 MG AND SENNOSIDES 8.6 MG 2 TABLET: 8.6; 5 TABLET, FILM COATED ORAL at 16:11

## 2023-03-13 ASSESSMENT — PAIN DESCRIPTION - PAIN TYPE
TYPE: ACUTE PAIN
TYPE: ACUTE PAIN;SURGICAL PAIN
TYPE: ACUTE PAIN
TYPE: ACUTE PAIN;SURGICAL PAIN

## 2023-03-13 NOTE — PROGRESS NOTES
[facility] Medicine Daily Progress Note    Date of Service  3/13/2023    Chief Complaint  Gage Garcia is a 47 y.o. male admitted 3/12/2023 with left ankle post-operative infection.    Hospital Course  Gage Garcia is a 47 y.o. male who presented 3/12/2023 with left ankle post-operative infection.  Mr. Garcia has a past medical history of hypertension, pseudogout, cardiomyopathy with ejection fraction 40% unclear etiology that was recently admitted here from 2/4/2023 through 2/9/2023 with open ankle fracture for which she eventually went to the operating room with Dr. Persaud on 2/4/2023 and underwent irrigation and debridement with internal fixation.  He presented to the [facility] office and had the stitches out after 16 days.  Subsequently he has had drainage from the left ankle lateral site with blood and pus.  He has not had a fever.  Orthopedics was called from emergency room recommended inpatient status with IV antibiotics and n.p.o. at midnight for possible surgery in the morning.  In the emergency room his blood pressure was up to 184 systolic upon questioning patient states he forgot to take his blood pressure medicines for the past few days.    Interval Problem Update  Patient was seen and examined at bedside    Plan for I&D tomorrow  I discussed with ID, will hold antibiotics until after surgery for better culture sensitivity    Persistent uncontrolled hypertension, increased home dose Imdur to 60 mg, continue amlodipine and spironolactone.  May consider add ACEI/ARB's.  Labetalol as needed    I have discussed this patient's plan of care and discharge plan at IDT rounds today with Case Management, Nursing, Nursing leadership, and other members of the IDT team.    Consultants/Specialty  infectious disease and orthopedics    Code Status  Full Code    Disposition  Patient is not medically cleared for discharge.   Anticipate discharge to to home with organized home healthcare and close outpatient follow-up.  I have  placed the appropriate orders for post-discharge needs.    Review of Systems  All 14 systems were reviewed and negative except as mentioned above    Physical Exam  Temp:  [36.3 °C (97.3 °F)-36.9 °C (98.5 °F)] 36.3 °C (97.3 °F)  Pulse:  [] 91  Resp:  [17-18] 18  BP: (150-192)/() 177/99  SpO2:  [92 %-96 %] 96 %    Physical Exam  Constitutional:       Appearance: He is obese. He is ill-appearing.   HENT:      Head: Normocephalic.      Mouth/Throat:      Mouth: Mucous membranes are moist.   Eyes:      Extraocular Movements: Extraocular movements intact.      Conjunctiva/sclera: Conjunctivae normal.   Cardiovascular:      Rate and Rhythm: Normal rate and regular rhythm.      Pulses: Normal pulses.      Heart sounds: Normal heart sounds.   Pulmonary:      Effort: Pulmonary effort is normal.      Breath sounds: Normal breath sounds.   Abdominal:      General: Bowel sounds are normal. There is no distension.      Palpations: Abdomen is soft.      Tenderness: There is no abdominal tenderness.   Musculoskeletal:         General: Swelling and tenderness present.      Cervical back: Normal range of motion and neck supple.      Comments: Left ankle open wound, swelling, erythema   Neurological:      Mental Status: He is alert and oriented to person, place, and time. Mental status is at baseline.           Fluids    Intake/Output Summary (Last 24 hours) at 3/13/2023 1635  Last data filed at 3/13/2023 1600  Gross per 24 hour   Intake 840 ml   Output 1890 ml   Net -1050 ml       Laboratory  Recent Labs     03/12/23  0654   WBC 7.0   RBC 6.08   HEMOGLOBIN 17.0   HEMATOCRIT 50.5   MCV 83.1   MCH 28.0   MCHC 33.7   RDW 42.2   PLATELETCT 232   MPV 9.9     Recent Labs     03/12/23  0654   SODIUM 143   POTASSIUM 3.6   CHLORIDE 105   CO2 28   GLUCOSE 103*   BUN 16   CREATININE 1.02   CALCIUM 9.4                   Imaging  DX-ANKLE 3+ VIEWS LEFT   Final Result         1.  Postoperative changes of distal fibular ORIF.   2.   Small bony fragments adjacent to the medial malleolus and small posterior malleolus fracture fragment are seen likely from prior injury.              Assessment/Plan  * Postoperative wound infection- (present on admission)  Assessment & Plan  Per patient he has had blood and purulent drainage from the lateral ankle surgical site.   WBC normal with elevated CRP  IV antibiotics and pain control  Dr. Chance brooke was called from the ER     Plan for I&D tomorrow  I discussed with ID, will hold antibiotics until after surgery for better culture sensitivity    Pseudogout- (present on admission)  Assessment & Plan  Hx of    Dependence on nicotine from cigarettes- (present on admission)  Assessment & Plan  Cessation again discussed  He notes that he has been very stressed and hopes to quit  Nicotine patch    Chronic systolic CHF (congestive heart failure) (HCC)- (present on admission)  Assessment & Plan  Last echo Feb had an EF 40%  Without exacerbation  He has been on Lasix and aldactone but not a beta blocker      Hypertensive urgency- (present on admission)  Assessment & Plan  He is on isosorbide, lasix, norvasc, and aldactone though missed his meds for the past few days and has an elevated blood pressure of 170    3/12 Persistent uncontrolled hypertension,   increased home dose Imdur to 60 mg,   continue amlodipine and spironolactone.    May consider add ACEI/ARB's.    Labetalol as needed    May consider secondary hypertension work-up if BP stays uncontrolled         VTE prophylaxis: SCDs/TEDs    I have performed a physical exam and reviewed and updated ROS and Plan today (3/13/2023). In review of yesterday's note (3/12/2023), there are no changes except as documented above.

## 2023-03-13 NOTE — PROGRESS NOTES
4 Eyes Skin Assessment Completed by LUIS Mclain and LUIS Aguilar.    Head WDL  Ears WDL  Nose WDL  Mouth WDL  Neck WDL  Breast/Chest Scar  Shoulder Blades WDL  Spine WDL  (R) Arm/Elbow/Hand WDL  (L) Arm/Elbow/Hand WDL  Abdomen Scars  Groin WDL  Scrotum/Coccyx/Buttocks Scabs  (R) Leg Abrasions, scars  (L) Leg Scars on shin, wound on left ankle (dressing placed, CDand I)  (R) Heel/Foot/Toe dryness  (L) Heel/Foot/Toe Scar left heel          Devices In Places piv        Interventions In Place Pressure Redistribution Mattress    Possible Skin Injury No    Pictures Uploaded Into Epic Yes (wound on left ankle)  Wound Consult Placed N/A  RN Wound Prevention Protocol Ordered No

## 2023-03-13 NOTE — PROGRESS NOTES
Case discussed with Dr Persaud  To OR tomorrow for I&D  ER wound culture positive for GPC- Dr Fields ID consult this am  NPO after midnight

## 2023-03-13 NOTE — DISCHARGE PLANNING
Received Choice form at 1400  Agency/Facility Name: [facility] (extension for [facility])  Referral sent per [facility] form - need order

## 2023-03-13 NOTE — FACE TO FACE
Face to Face Note  -  Durable Medical Equipment    Marianne Avendaño M.D. - NPI: [id number]  I certify that this patient is under my care and that they had a durable medical equipment(DME)face to face encounter by myself that meets the physician DME face-to-face encounter requirements with this patient on:    Date of encounter:   Patient:                    MRN:                       YOB: 20[birth year]  Gage Garcia  [id number]  1975     The encounter with the patient was in whole, or in part, for the following medical condition, which is the primary reason for durable medical equipment:  Other - postoperative wound infection    I certify that, based on my findings, the following durable medical equipment is medically necessary:    Other DME Equipment - extra tall walker .    My Clinical findings support the need for the above equipment due to:  Abnormal Gait

## 2023-03-13 NOTE — PROGRESS NOTES
0710 Patient's in bed. Bedside report received from STEPHANIE Heredia RN at the beginning of the shift.     0754 Voalted Dr Avendaño of patient's elevated bp - 166/110.    0800 New order received and acknowledged from Dr Avendaño (see MAR).    0804 Patient's sitting up in bed. Educated on the importance/use of IS at les 10x every hour while awake, able to reach 4000. Remains NPO, sips with meds as per order. Fall protocol in effect. Call light within reach. Reminded patient to call for assist Assessment completed. No distress noted. Plan of care reviewed with the patient. Verbalized understanding.    0900 Dr Avendaño visited. POC discussed with the patient.    0955 New order received and acknowledged from Dr Avendaño ( MRSA by PCR).    1004 New order received and acknowledged from Dr Avendaño (see MAR).    1108 CHARLES Steen visited. POC discussed with the patient. New order received and acknowledged - diet order and NPO sips with meds at midnight.    1155 MRSA BY PCR swab done and sent to the lab as per order.    1325 Patient's in bed. No distress noted.     1408 Medicated with Oxycodone (see MAR) and Tylenol (see MAR) for c/o's headache, rates pain 8/10.    1411 Patient c/o's right AC piv painful (flushes well), dc'd, noted with thip intact. New piv started o left FW, gauge #20 x 1  attempt.    1520 Patient's in bed. No distress noted.    1607 VOALTED  Dr Avendaño of patient's /99.    1618 New order received from Dr Avendaño (see MAR).    1634 Medicated with Labetalol (see MAR) for bp 177/99.    1635 New order received and acknowledged from Dr Avendaño - labs.    1715 Patient's in bed. No distress noted.     1915 Patient's in bed. No changes in status. Bedside report given to Miranda BROWN RN (Sanjana).

## 2023-03-13 NOTE — CARE PLAN
The patient is Stable - Low risk of patient condition declining or worsening    Shift Goals  Clinical Goals: pain control, mobility, safety, PT and OT Eval.  Patient Goals: pain control, comfort, PT and OT eval  Family Goals: no family present    Progress made toward(s) clinical / shift goals:        Problem: Knowledge Deficit - Standard  Goal: Patient and family/care givers will demonstrate understanding of plan of care, disease process/condition, diagnostic tests and medications  Outcome: Progressing  Note: POC discussed with the patient. Questions answered. Verbalized understanding.     Problem: Pain - Standard  Goal: Alleviation of pain or a reduction in pain to the patient’s comfort goal  Outcome: Progressing  Note: Educated on pain scale. Encouraged to verbalize pain. Will medicate as per MAR.       Patient is not progressing towards the following goals:

## 2023-03-13 NOTE — DISCHARGE PLANNING
Case Management Discharge Planning    Admission Date: 3/12/2023  GMLOS: 3.4  ALOS: 1    6-Clicks ADL Score: 18  6-Clicks Mobility Score: 18      Anticipated Discharge Dispo: Discharge Disposition: D/T to home under [facility] care in anticipation of covered skilled care (06)    DME Needed: Yes    DME Ordered: Yes, extensions for FWW.    Action(s) Taken: Updated Provider/Nurse on Discharge Plan and DC Assessment Complete (See below)    Escalations Completed: None    Medically Clear: No    Next Steps: Plan of care evolving. Suerty planned for tomorrow and ID on the case. Discharge needs to be determined.    Barriers to Discharge: Medical clearance    Is the patient up for discharge tomorrow: No

## 2023-03-13 NOTE — PROGRESS NOTES
Attention order for walker extensions, we do not carry this item. Only walkers. I recommend having the patients care coordinator checking with any known DME companies that might possibly be able to resource this item.
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
## 2023-03-13 NOTE — DISCHARGE PLANNING
Care Transition Team Assessment    In the case of an emergency, pt's legal NOK is None    RNCM met with pt at bedside and obtained the information used in this assessment. Pt verified accuracy of facesheet. Pt lives in a single story apt alone.  Pt uses [facility]. Prior to current hospitalization, pt was completely independent in ADLS/IADLS. Has been using FWW  which was issued after last surgery. It is not tall enough for pt. Called [employer], they do have extensions. Sent choice form to DPA,Pt drives and is able to attend necessary MD appointments. Pt is concerned about finances. His employer emailed [employer] documents to me. I gave to pt for him to complete his portion. Pt has no support system. Pt denies any hx of substance use and denies any dx of mh.   Information Source:pt  Orientation Level: Oriented X4  Information Given By: Patient  Informant's Name: Gage  Who is responsible for making decisions for patient? : Patient         Elopement Risk  Legal Hold: No  Ambulatory or Self Mobile in Wheelchair: No-Not an Elopement Risk  Elopement Risk: Not at Risk for Elopement    Interdisciplinary Discharge Planning  Does Admitting Nurse Feel This Could be a Complex Discharge?: No  Primary Care Physician: Sergio  Lives with - Patient's Self Care Capacity: Alone and Able to Care For Self  Patient or legal guardian wants to designate a caregiver: No  Support Systems: None  Housing / Facility: 1 Story Apartment / Condo  Do You Take your Prescribed Medications Regularly: Yes  Mobility Issues: Yes  Prior Services: None  Durable Medical Equipment: Walker    Discharge Preparedness  What is your plan after discharge?: Uncertain - pending medical team collaboration  Prior Functional Level: Ambulatory, Drives Self, Independent with Activities of Daily Living, Independent with Medication Management  Difficulity with ADLs: None  Difficulity with IADLs: None    Functional Assesment  Prior Functional Level: Ambulatory, Drives  Self, Independent with Activities of Daily Living, Independent with Medication Management    Finances  Prescription Coverage: Yes                   Domestic Abuse  Have you ever been the victim of abuse or violence?: No  Physical Abuse or Sexual Abuse: No  Verbal Abuse or Emotional Abuse: No  Possible Abuse/Neglect Reported to:: Not Applicable    Psychological Assessment  History of Substance Abuse: None  History of Psychiatric Problems: No    Discharge Risks or Barriers  Discharge risks or barriers?: Complex medical needs, Lives alone, no community support  Patient risk factors: Complex medical needs, Lack of outside supports    Anticipated Discharge Information  Discharge Disposition: D/T to home under [facility] care in anticipation of covered skilled care (06)

## 2023-03-13 NOTE — H&P
Surgery Orthopedic History & Physical Note    Date  3/12/2023    Primary Care Physician  Juan Davalos M.D.    CC  * No surgery found *    HPI  This is a 47 y.o. male who presented with left ankle pain and drainage approximately 5 weeks status post open reduction internal fixation of a left open ankle fracture dislocation by Dr. Diaz.  He was seen recently in clinic for suture removal and since then has noted some wound breakdown with drainage she denies any subjective fever chills and pain is approximately 8 of 10 severity somewhat amenable to ice elevation.  He denies recent trauma he states he has been compliant with nonweightbearing and denies significant numbness about the foot.  He was admitted to the hospitalist today and I was consulted as the [facility] orthopedic surgeon on-call.    Past Medical History:   Diagnosis Date    Hypertension     Obesity     Smoker     Strep pharyngitis        Past Surgical History:   Procedure Laterality Date    ORIF, ANKLE Left 2/4/2023    Procedure: ORIF, ANKLE;  Surgeon: Jorge Persaud M.D.;  Location: SURGERY [facility];  Service: Orthopedics       Current Facility-Administered Medications   Medication Dose Route Frequency Provider Last Rate Last Admin    atorvastatin (LIPITOR) tablet 10 mg  10 mg Oral Nightly Derick Conteh M.D.   10 mg at 03/12/23 2006    spironolactone (ALDACTONE) tablet 25 mg  25 mg Oral DAILY Derick Conteh M.D.   25 mg at 03/12/23 1353    senna-docusate (PERICOLACE or SENOKOT S) 8.6-50 MG per tablet 2 Tablet  2 Tablet Oral BID Derick Conteh M.D.        And    polyethylene glycol/lytes (MIRALAX) PACKET 1 Packet  1 Packet Oral QDAY PRN Derick Conteh M.D.        And    magnesium hydroxide (MILK OF MAGNESIA) suspension 30 mL  30 mL Oral QDAY PRN Derick Conteh M.D.        And    bisacodyl (DULCOLAX) suppository 10 mg  10 mg Rectal QDAY PRN Derick Conteh M.D.        acetaminophen (Tylenol) tablet 650 mg  650 mg Oral Q6HRS PRN Derick Conteh M.D.    650 mg at 03/12/23 2011    ondansetron (ZOFRAN) syringe/vial injection 4 mg  4 mg Intravenous Q4HRS PRN Derick Conteh M.D.        ondansetron (ZOFRAN ODT) dispertab 4 mg  4 mg Oral Q4HRS PRN Derick Conteh M.D.        promethazine (PHENERGAN) tablet 12.5-25 mg  12.5-25 mg Oral Q4HRS PRN Derick Conteh M.D.        promethazine (PHENERGAN) suppository 12.5-25 mg  12.5-25 mg Rectal Q4HRS PRN Derick Conteh M.D.        prochlorperazine (COMPAZINE) injection 5-10 mg  5-10 mg Intravenous Q4HRS PRN Derick Conteh M.D.        ampicillin/sulbactam (UNASYN) 3 g in  mL IVPB  3 g Intravenous Q6HRS Derick Conteh M.D.   Stopped at 03/12/23 2039    doxycycline monohydrate (ADOXA) tablet 100 mg  100 mg Oral Q12HRS Derick Conteh M.D.   100 mg at 03/12/23 1719    nicotine (NICODERM) 21 MG/24HR 21 mg  21 mg Transdermal Daily-0600 Derick Conteh M.D.        oxyCODONE immediate-release (ROXICODONE) tablet 5-10 mg  5-10 mg Oral Q4HRS PRN Derick Conteh M.D.   10 mg at 03/12/23 2005    [START ON 3/13/2023] amLODIPine (NORVASC) tablet 10 mg  10 mg Oral DAILY Derick Conteh M.D.        [START ON 3/13/2023] isosorbide mononitrate SR (IMDUR) tablet 30 mg  30 mg Oral DAILY Derick Conteh M.D.           Social History     Socioeconomic History    Marital status: Single     Spouse name: Not on file    Number of children: Not on file    Years of education: Not on file    Highest education level: Not on file   Occupational History    Not on file   Tobacco Use    Smoking status: Every Day     Packs/day: 1.50     Years: 22.00     Pack years: 33.00     Types: Cigarettes    Smokeless tobacco: Never   Vaping Use    Vaping Use: Never used   Substance and Sexual Activity    Alcohol use: Not Currently     Comment: occ    Drug use: Not Currently     Types: Inhaled, Marijuana     Comment: thc    Sexual activity: Yes   Other Topics Concern    Not on file   Social History Narrative    Not on file     Social Determinants of Health     Financial  Resource Strain: Not on file   Food Insecurity: Not on file   Transportation Needs: Not on file   Physical Activity: Not on file   Stress: Not on file   Social Connections: Not on file   Intimate Partner Violence: Not on file   Housing Stability: Not on file       Family History   Problem Relation Age of Onset    Heart Disease Mother     Diabetes Mother     Ovarian Cancer Neg Hx     Tubal Cancer Neg Hx     Breast Cancer Neg Hx     Colorectal Cancer Neg Hx     Peritoneal Cancer Neg Hx        Allergies  Patient has no known allergies.    Review of Systems  Negative    Physical Exam    Vital Signs  Blood Pressure: (!) 159/91   Temperature: 36.9 °C (98.4 °F)   Pulse: 98   Respiration: 17   Pulse Oximetry: 93 %   General:  Well appearing, in no acute distress. Appropriate and cooperative with the exam.  HEENT: Normocephalic, atraumatic. Cranial nerves II-XII are grossly intact.  Cardiovascular: 2+ distal pulses. No cyanosis, clubbing, or edema.  Pulmonary: Breathing comfortably on room air without labor.  Abdomen: Soft and unremarkable.  Skin: No rashes, jaundice, or cyanosis.  Lymphatic: No epitrochlear lymphadenopathy.  Spine:  Clinically midline.   Gait:  Patient ambulates without a limp.  Musculoskeletal: Left ankle has a approximately 6 x 1 cm area of wound breakdown fairly superficial with eschar without current drainage or significant erythema.leg and foot compartments are soft good ankle plantarflexion dorsiflexion without significant pain.  Medial incision benign.  Neurologic: Sensation grossly intact about the left lower extremity.    Labs:  Recent Labs     03/12/23  0654   WBC 7.0   RBC 6.08   HEMOGLOBIN 17.0   HEMATOCRIT 50.5   MCV 83.1   MCH 28.0   MCHC 33.7   RDW 42.2   PLATELETCT 232   MPV 9.9     Recent Labs     03/12/23  0654   SODIUM 143   POTASSIUM 3.6   CHLORIDE 105   CO2 28   GLUCOSE 103*   BUN 16   CREATININE 1.02   CALCIUM 9.4               Radiology:  DX-ANKLE 3+ VIEWS LEFT   Final Result          1.  Postoperative changes of distal fibular ORIF.   2.  Small bony fragments adjacent to the medial malleolus and small posterior malleolus fracture fragment are seen likely from prior injury.               Assessment: 47-year-old male 5-week status post ORIF left open ankle fracture, now with superficial wound breakdown admitted to the hospitalist.    Plan: Admission to hospitalist for medical management and IV antibiotics.  N.p.o. after midnight for possible surgical intervention with  All times in our trauma service on Monday, March 13, 2023.  Patient consents to this plan.  Continue elevation nonweightbearing.

## 2023-03-13 NOTE — CARE PLAN
The patient is Watcher - Medium risk of patient condition declining or worsening    Shift Goals  Clinical Goals: pain control, safety  Patient Goals: food, tylenol, rest    Progress made toward(s) clinical / shift goals:    Problem: Knowledge Deficit - Standard  Goal: Patient and family/care givers will demonstrate understanding of plan of care, disease process/condition, diagnostic tests and medications  Outcome: Progressing   POC discussed- pt verbalized understanding.  Problem: Pain - Standard  Goal: Alleviation of pain or a reduction in pain to the patient’s comfort goal  Outcome: Progressing   Tylenol given PRN with +results. Educated pt on importance of pain control- pt verbalized understanding.      Patient is not progressing towards the following goals:

## 2023-03-13 NOTE — PROGRESS NOTES
0355: Pt blood pressure elevated 191/116. Pt complains of Head ache. Lindsey Wegener, NP contacted was she gave the verbal order to give 6 am blood pressure medications early at 420 am. Will continue to monitor and assess.

## 2023-03-14 ENCOUNTER — ANESTHESIA EVENT (OUTPATIENT)
Dept: SURGERY | Facility: MEDICAL CENTER | Age: 48
DRG: 857 | End: 2023-03-14
Payer: COMMERCIAL

## 2023-03-14 ENCOUNTER — ANESTHESIA (OUTPATIENT)
Dept: SURGERY | Facility: MEDICAL CENTER | Age: 48
DRG: 857 | End: 2023-03-14
Payer: COMMERCIAL

## 2023-03-14 LAB
ANION GAP SERPL CALC-SCNC: 11 MMOL/L (ref 7–16)
BACTERIA WND AEROBE CULT: NORMAL
BUN SERPL-MCNC: 17 MG/DL (ref 8–22)
CALCIUM SERPL-MCNC: 9 MG/DL (ref 8.5–10.5)
CHLORIDE SERPL-SCNC: 104 MMOL/L (ref 96–112)
CO2 SERPL-SCNC: 26 MMOL/L (ref 20–33)
CREAT SERPL-MCNC: 1.01 MG/DL (ref 0.5–1.4)
ERYTHROCYTE [DISTWIDTH] IN BLOOD BY AUTOMATED COUNT: 41.5 FL (ref 35.9–50)
GFR SERPLBLD CREATININE-BSD FMLA CKD-EPI: 92 ML/MIN/1.73 M 2
GLUCOSE SERPL-MCNC: 117 MG/DL (ref 65–99)
GRAM STN SPEC: NORMAL
HCT VFR BLD AUTO: 43 % (ref 42–52)
HGB BLD-MCNC: 14.2 G/DL (ref 14–18)
MAGNESIUM SERPL-MCNC: 1.9 MG/DL (ref 1.5–2.5)
MCH RBC QN AUTO: 27 PG (ref 27–33)
MCHC RBC AUTO-ENTMCNC: 33 G/DL (ref 33.7–35.3)
MCV RBC AUTO: 81.7 FL (ref 81.4–97.8)
PHOSPHATE SERPL-MCNC: 4 MG/DL (ref 2.5–4.5)
PLATELET # BLD AUTO: 198 K/UL (ref 164–446)
PMV BLD AUTO: 9.5 FL (ref 9–12.9)
POTASSIUM SERPL-SCNC: 3.6 MMOL/L (ref 3.6–5.5)
RBC # BLD AUTO: 5.26 M/UL (ref 4.7–6.1)
SIGNIFICANT IND 70042: NORMAL
SITE SITE: NORMAL
SODIUM SERPL-SCNC: 141 MMOL/L (ref 135–145)
SOURCE SOURCE: NORMAL
WBC # BLD AUTO: 6.8 K/UL (ref 4.8–10.8)

## 2023-03-14 PROCEDURE — 770001 HCHG ROOM/CARE - MED/SURG/GYN PRIV*

## 2023-03-14 PROCEDURE — 700111 HCHG RX REV CODE 636 W/ 250 OVERRIDE (IP): Performed by: HOSPITALIST

## 2023-03-14 PROCEDURE — 87015 SPECIMEN INFECT AGNT CONCNTJ: CPT | Mod: 91

## 2023-03-14 PROCEDURE — 87102 FUNGUS ISOLATION CULTURE: CPT

## 2023-03-14 PROCEDURE — 80048 BASIC METABOLIC PNL TOTAL CA: CPT

## 2023-03-14 PROCEDURE — 160036 HCHG PACU - EA ADDL 30 MINS PHASE I: Performed by: ORTHOPAEDIC SURGERY

## 2023-03-14 PROCEDURE — 700105 HCHG RX REV CODE 258: Performed by: ANESTHESIOLOGY

## 2023-03-14 PROCEDURE — A9270 NON-COVERED ITEM OR SERVICE: HCPCS | Performed by: ANESTHESIOLOGY

## 2023-03-14 PROCEDURE — 85027 COMPLETE CBC AUTOMATED: CPT

## 2023-03-14 PROCEDURE — 160009 HCHG ANES TIME/MIN: Performed by: ORTHOPAEDIC SURGERY

## 2023-03-14 PROCEDURE — 0KBT0ZZ EXCISION OF LEFT LOWER LEG MUSCLE, OPEN APPROACH: ICD-10-PCS | Performed by: ORTHOPAEDIC SURGERY

## 2023-03-14 PROCEDURE — 87076 CULTURE ANAEROBE IDENT EACH: CPT | Mod: 91

## 2023-03-14 PROCEDURE — 83735 ASSAY OF MAGNESIUM: CPT

## 2023-03-14 PROCEDURE — 36415 COLL VENOUS BLD VENIPUNCTURE: CPT

## 2023-03-14 PROCEDURE — 99233 SBSQ HOSP IP/OBS HIGH 50: CPT | Performed by: INTERNAL MEDICINE

## 2023-03-14 PROCEDURE — A9270 NON-COVERED ITEM OR SERVICE: HCPCS | Performed by: STUDENT IN AN ORGANIZED HEALTH CARE EDUCATION/TRAINING PROGRAM

## 2023-03-14 PROCEDURE — 87205 SMEAR GRAM STAIN: CPT | Mod: 91

## 2023-03-14 PROCEDURE — 160048 HCHG OR STATISTICAL LEVEL 1-5: Performed by: ORTHOPAEDIC SURGERY

## 2023-03-14 PROCEDURE — A9270 NON-COVERED ITEM OR SERVICE: HCPCS | Performed by: HOSPITALIST

## 2023-03-14 PROCEDURE — 160002 HCHG RECOVERY MINUTES (STAT): Performed by: ORTHOPAEDIC SURGERY

## 2023-03-14 PROCEDURE — 87070 CULTURE OTHR SPECIMN AEROBIC: CPT

## 2023-03-14 PROCEDURE — 87075 CULTR BACTERIA EXCEPT BLOOD: CPT | Mod: 91

## 2023-03-14 PROCEDURE — 160035 HCHG PACU - 1ST 60 MINS PHASE I: Performed by: ORTHOPAEDIC SURGERY

## 2023-03-14 PROCEDURE — 87116 MYCOBACTERIA CULTURE: CPT

## 2023-03-14 PROCEDURE — 700102 HCHG RX REV CODE 250 W/ 637 OVERRIDE(OP): Performed by: STUDENT IN AN ORGANIZED HEALTH CARE EDUCATION/TRAINING PROGRAM

## 2023-03-14 PROCEDURE — 87186 SC STD MICRODIL/AGAR DIL: CPT

## 2023-03-14 PROCEDURE — 700111 HCHG RX REV CODE 636 W/ 250 OVERRIDE (IP): Performed by: ANESTHESIOLOGY

## 2023-03-14 PROCEDURE — 160038 HCHG SURGERY MINUTES - EA ADDL 1 MIN LEVEL 2: Performed by: ORTHOPAEDIC SURGERY

## 2023-03-14 PROCEDURE — 700102 HCHG RX REV CODE 250 W/ 637 OVERRIDE(OP): Performed by: HOSPITALIST

## 2023-03-14 PROCEDURE — 700101 HCHG RX REV CODE 250: Performed by: ANESTHESIOLOGY

## 2023-03-14 PROCEDURE — 87077 CULTURE AEROBIC IDENTIFY: CPT | Mod: 91

## 2023-03-14 PROCEDURE — 13160 SEC CLSR SURG WND/DEHSN XTN: CPT | Mod: ASROC,78,LT | Performed by: PHYSICIAN ASSISTANT

## 2023-03-14 PROCEDURE — 87206 SMEAR FLUORESCENT/ACID STAI: CPT

## 2023-03-14 PROCEDURE — 00400 ANES INTEGUMENTARY SYS NOS: CPT | Performed by: ANESTHESIOLOGY

## 2023-03-14 PROCEDURE — 700111 HCHG RX REV CODE 636 W/ 250 OVERRIDE (IP): Performed by: INTERNAL MEDICINE

## 2023-03-14 PROCEDURE — 13160 SEC CLSR SURG WND/DEHSN XTN: CPT | Mod: 78,LT | Performed by: ORTHOPAEDIC SURGERY

## 2023-03-14 PROCEDURE — 700105 HCHG RX REV CODE 258: Performed by: INTERNAL MEDICINE

## 2023-03-14 PROCEDURE — 160027 HCHG SURGERY MINUTES - 1ST 30 MINS LEVEL 2: Performed by: ORTHOPAEDIC SURGERY

## 2023-03-14 PROCEDURE — 700102 HCHG RX REV CODE 250 W/ 637 OVERRIDE(OP): Performed by: ANESTHESIOLOGY

## 2023-03-14 PROCEDURE — 84100 ASSAY OF PHOSPHORUS: CPT

## 2023-03-14 PROCEDURE — 99233 SBSQ HOSP IP/OBS HIGH 50: CPT | Performed by: HOSPITALIST

## 2023-03-14 RX ORDER — SODIUM CHLORIDE, SODIUM LACTATE, POTASSIUM CHLORIDE, CALCIUM CHLORIDE 600; 310; 30; 20 MG/100ML; MG/100ML; MG/100ML; MG/100ML
INJECTION, SOLUTION INTRAVENOUS
Status: DISCONTINUED | OUTPATIENT
Start: 2023-03-14 | End: 2023-03-14 | Stop reason: SURG

## 2023-03-14 RX ORDER — HYDROMORPHONE HYDROCHLORIDE 1 MG/ML
0.2 INJECTION, SOLUTION INTRAMUSCULAR; INTRAVENOUS; SUBCUTANEOUS
Status: DISCONTINUED | OUTPATIENT
Start: 2023-03-14 | End: 2023-03-14 | Stop reason: HOSPADM

## 2023-03-14 RX ORDER — LIDOCAINE HYDROCHLORIDE 20 MG/ML
1 JELLY TOPICAL
Status: DISCONTINUED | OUTPATIENT
Start: 2023-03-14 | End: 2023-03-18 | Stop reason: HOSPADM

## 2023-03-14 RX ORDER — LISINOPRIL 20 MG/1
20 TABLET ORAL EVERY EVENING
Status: DISCONTINUED | OUTPATIENT
Start: 2023-03-14 | End: 2023-03-15

## 2023-03-14 RX ORDER — HYDROMORPHONE HYDROCHLORIDE 1 MG/ML
0.4 INJECTION, SOLUTION INTRAMUSCULAR; INTRAVENOUS; SUBCUTANEOUS
Status: DISCONTINUED | OUTPATIENT
Start: 2023-03-14 | End: 2023-03-14 | Stop reason: HOSPADM

## 2023-03-14 RX ORDER — OXYCODONE HCL 5 MG/5 ML
5 SOLUTION, ORAL ORAL
Status: COMPLETED | OUTPATIENT
Start: 2023-03-14 | End: 2023-03-14

## 2023-03-14 RX ORDER — KETOROLAC TROMETHAMINE 30 MG/ML
INJECTION, SOLUTION INTRAMUSCULAR; INTRAVENOUS PRN
Status: DISCONTINUED | OUTPATIENT
Start: 2023-03-14 | End: 2023-03-14 | Stop reason: SURG

## 2023-03-14 RX ORDER — LABETALOL HYDROCHLORIDE 5 MG/ML
5 INJECTION, SOLUTION INTRAVENOUS
Status: DISCONTINUED | OUTPATIENT
Start: 2023-03-14 | End: 2023-03-14 | Stop reason: HOSPADM

## 2023-03-14 RX ORDER — HYDRALAZINE HYDROCHLORIDE 20 MG/ML
5 INJECTION INTRAMUSCULAR; INTRAVENOUS
Status: DISCONTINUED | OUTPATIENT
Start: 2023-03-14 | End: 2023-03-14 | Stop reason: HOSPADM

## 2023-03-14 RX ORDER — ALBUTEROL SULFATE 2.5 MG/3ML
2.5 SOLUTION RESPIRATORY (INHALATION)
Status: DISCONTINUED | OUTPATIENT
Start: 2023-03-14 | End: 2023-03-14 | Stop reason: HOSPADM

## 2023-03-14 RX ORDER — HYDROMORPHONE HYDROCHLORIDE 1 MG/ML
0.1 INJECTION, SOLUTION INTRAMUSCULAR; INTRAVENOUS; SUBCUTANEOUS
Status: DISCONTINUED | OUTPATIENT
Start: 2023-03-14 | End: 2023-03-14 | Stop reason: HOSPADM

## 2023-03-14 RX ORDER — HYDRALAZINE HYDROCHLORIDE 20 MG/ML
INJECTION INTRAMUSCULAR; INTRAVENOUS PRN
Status: DISCONTINUED | OUTPATIENT
Start: 2023-03-14 | End: 2023-03-14 | Stop reason: SURG

## 2023-03-14 RX ORDER — ISOSORBIDE MONONITRATE 30 MG/1
30 TABLET, EXTENDED RELEASE ORAL DAILY
Status: DISCONTINUED | OUTPATIENT
Start: 2023-03-15 | End: 2023-03-15

## 2023-03-14 RX ORDER — LORAZEPAM 0.5 MG/1
0.5 TABLET ORAL EVERY 4 HOURS PRN
Status: DISCONTINUED | OUTPATIENT
Start: 2023-03-14 | End: 2023-03-18 | Stop reason: HOSPADM

## 2023-03-14 RX ORDER — ONDANSETRON 2 MG/ML
INJECTION INTRAMUSCULAR; INTRAVENOUS PRN
Status: DISCONTINUED | OUTPATIENT
Start: 2023-03-14 | End: 2023-03-14 | Stop reason: SURG

## 2023-03-14 RX ORDER — OXYCODONE HCL 5 MG/5 ML
10 SOLUTION, ORAL ORAL
Status: COMPLETED | OUTPATIENT
Start: 2023-03-14 | End: 2023-03-14

## 2023-03-14 RX ORDER — SODIUM CHLORIDE, SODIUM LACTATE, POTASSIUM CHLORIDE, CALCIUM CHLORIDE 600; 310; 30; 20 MG/100ML; MG/100ML; MG/100ML; MG/100ML
INJECTION, SOLUTION INTRAVENOUS CONTINUOUS
Status: DISCONTINUED | OUTPATIENT
Start: 2023-03-14 | End: 2023-03-14 | Stop reason: HOSPADM

## 2023-03-14 RX ORDER — HALOPERIDOL 5 MG/ML
1 INJECTION INTRAMUSCULAR
Status: DISCONTINUED | OUTPATIENT
Start: 2023-03-14 | End: 2023-03-14 | Stop reason: HOSPADM

## 2023-03-14 RX ORDER — HYDRALAZINE HYDROCHLORIDE 20 MG/ML
10 INJECTION INTRAMUSCULAR; INTRAVENOUS EVERY 6 HOURS PRN
Status: DISCONTINUED | OUTPATIENT
Start: 2023-03-14 | End: 2023-03-18 | Stop reason: HOSPADM

## 2023-03-14 RX ORDER — CARVEDILOL 6.25 MG/1
12.5 TABLET ORAL 2 TIMES DAILY WITH MEALS
Status: DISCONTINUED | OUTPATIENT
Start: 2023-03-14 | End: 2023-03-15

## 2023-03-14 RX ORDER — CEFAZOLIN SODIUM 1 G/3ML
INJECTION, POWDER, FOR SOLUTION INTRAMUSCULAR; INTRAVENOUS PRN
Status: DISCONTINUED | OUTPATIENT
Start: 2023-03-14 | End: 2023-03-14 | Stop reason: SURG

## 2023-03-14 RX ORDER — DEXAMETHASONE SODIUM PHOSPHATE 4 MG/ML
INJECTION, SOLUTION INTRA-ARTICULAR; INTRALESIONAL; INTRAMUSCULAR; INTRAVENOUS; SOFT TISSUE PRN
Status: DISCONTINUED | OUTPATIENT
Start: 2023-03-14 | End: 2023-03-14 | Stop reason: SURG

## 2023-03-14 RX ORDER — ONDANSETRON 2 MG/ML
4 INJECTION INTRAMUSCULAR; INTRAVENOUS
Status: DISCONTINUED | OUTPATIENT
Start: 2023-03-14 | End: 2023-03-14 | Stop reason: HOSPADM

## 2023-03-14 RX ORDER — MEPERIDINE HYDROCHLORIDE 25 MG/ML
12.5 INJECTION INTRAMUSCULAR; INTRAVENOUS; SUBCUTANEOUS
Status: DISCONTINUED | OUTPATIENT
Start: 2023-03-14 | End: 2023-03-14 | Stop reason: HOSPADM

## 2023-03-14 RX ORDER — LIDOCAINE HYDROCHLORIDE 20 MG/ML
INJECTION, SOLUTION EPIDURAL; INFILTRATION; INTRACAUDAL; PERINEURAL PRN
Status: DISCONTINUED | OUTPATIENT
Start: 2023-03-14 | End: 2023-03-14 | Stop reason: SURG

## 2023-03-14 RX ORDER — DIPHENHYDRAMINE HYDROCHLORIDE 50 MG/ML
12.5 INJECTION INTRAMUSCULAR; INTRAVENOUS
Status: DISCONTINUED | OUTPATIENT
Start: 2023-03-14 | End: 2023-03-14 | Stop reason: HOSPADM

## 2023-03-14 RX ADMIN — AMLODIPINE BESYLATE 10 MG: 10 TABLET ORAL at 04:14

## 2023-03-14 RX ADMIN — PROPOFOL 50 MG: 10 INJECTION, EMULSION INTRAVENOUS at 10:30

## 2023-03-14 RX ADMIN — ONDANSETRON 4 MG: 2 INJECTION INTRAMUSCULAR; INTRAVENOUS at 10:42

## 2023-03-14 RX ADMIN — SODIUM CHLORIDE, POTASSIUM CHLORIDE, SODIUM LACTATE AND CALCIUM CHLORIDE: 600; 310; 30; 20 INJECTION, SOLUTION INTRAVENOUS at 11:36

## 2023-03-14 RX ADMIN — ATORVASTATIN CALCIUM 10 MG: 10 TABLET, FILM COATED ORAL at 21:28

## 2023-03-14 RX ADMIN — PIPERACILLIN AND TAZOBACTAM 4.5 G: 4; .5 INJECTION, POWDER, LYOPHILIZED, FOR SOLUTION INTRAVENOUS; PARENTERAL at 11:38

## 2023-03-14 RX ADMIN — HYDROMORPHONE HYDROCHLORIDE 0.2 MG: 1 INJECTION, SOLUTION INTRAMUSCULAR; INTRAVENOUS; SUBCUTANEOUS at 11:28

## 2023-03-14 RX ADMIN — ONDANSETRON 4 MG: 2 INJECTION INTRAMUSCULAR; INTRAVENOUS at 23:35

## 2023-03-14 RX ADMIN — ISOSORBIDE MONONITRATE 60 MG: 60 TABLET, EXTENDED RELEASE ORAL at 06:28

## 2023-03-14 RX ADMIN — LABETALOL HYDROCHLORIDE 10 MG: 5 INJECTION INTRAVENOUS at 06:32

## 2023-03-14 RX ADMIN — VANCOMYCIN HYDROCHLORIDE 3 G: 500 INJECTION, POWDER, LYOPHILIZED, FOR SOLUTION INTRAVENOUS at 12:26

## 2023-03-14 RX ADMIN — FUROSEMIDE 20 MG: 20 TABLET ORAL at 21:28

## 2023-03-14 RX ADMIN — LIDOCAINE HYDROCHLORIDE 100 MG: 20 INJECTION, SOLUTION EPIDURAL; INFILTRATION; INTRACAUDAL at 10:28

## 2023-03-14 RX ADMIN — SPIRONOLACTONE 25 MG: 25 TABLET ORAL at 04:14

## 2023-03-14 RX ADMIN — FENTANYL CITRATE 50 MCG: 50 INJECTION, SOLUTION INTRAMUSCULAR; INTRAVENOUS at 10:38

## 2023-03-14 RX ADMIN — OXYCODONE HYDROCHLORIDE 10 MG: 5 SOLUTION ORAL at 11:08

## 2023-03-14 RX ADMIN — SODIUM CHLORIDE, POTASSIUM CHLORIDE, SODIUM LACTATE AND CALCIUM CHLORIDE: 600; 310; 30; 20 INJECTION, SOLUTION INTRAVENOUS at 10:22

## 2023-03-14 RX ADMIN — DEXAMETHASONE SODIUM PHOSPHATE 4 MG: 4 INJECTION, SOLUTION INTRA-ARTICULAR; INTRALESIONAL; INTRAMUSCULAR; INTRAVENOUS; SOFT TISSUE at 10:42

## 2023-03-14 RX ADMIN — KETOROLAC TROMETHAMINE 30 MG: 30 INJECTION, SOLUTION INTRAMUSCULAR at 10:47

## 2023-03-14 RX ADMIN — PIPERACILLIN AND TAZOBACTAM 4.5 G: 4; .5 INJECTION, POWDER, LYOPHILIZED, FOR SOLUTION INTRAVENOUS; PARENTERAL at 16:29

## 2023-03-14 RX ADMIN — FENTANYL CITRATE 50 MCG: 50 INJECTION, SOLUTION INTRAMUSCULAR; INTRAVENOUS at 11:10

## 2023-03-14 RX ADMIN — PROPOFOL 200 MG: 10 INJECTION, EMULSION INTRAVENOUS at 10:28

## 2023-03-14 RX ADMIN — HYDRALAZINE HYDROCHLORIDE 10 MG: 20 INJECTION INTRAMUSCULAR; INTRAVENOUS at 10:51

## 2023-03-14 RX ADMIN — HYDROMORPHONE HYDROCHLORIDE 0.4 MG: 1 INJECTION, SOLUTION INTRAMUSCULAR; INTRAVENOUS; SUBCUTANEOUS at 11:18

## 2023-03-14 RX ADMIN — FENTANYL CITRATE 50 MCG: 50 INJECTION, SOLUTION INTRAMUSCULAR; INTRAVENOUS at 11:13

## 2023-03-14 RX ADMIN — PIPERACILLIN AND TAZOBACTAM 4.5 G: 4; .5 INJECTION, POWDER, LYOPHILIZED, FOR SOLUTION INTRAVENOUS; PARENTERAL at 23:40

## 2023-03-14 RX ADMIN — FENTANYL CITRATE 100 MCG: 50 INJECTION, SOLUTION INTRAMUSCULAR; INTRAVENOUS at 10:26

## 2023-03-14 RX ADMIN — OXYCODONE HYDROCHLORIDE 5 MG: 5 TABLET ORAL at 16:23

## 2023-03-14 RX ADMIN — LISINOPRIL 20 MG: 20 TABLET ORAL at 17:52

## 2023-03-14 RX ADMIN — PROPOFOL 50 MG: 10 INJECTION, EMULSION INTRAVENOUS at 10:35

## 2023-03-14 RX ADMIN — CARVEDILOL 12.5 MG: 6.25 TABLET, FILM COATED ORAL at 16:17

## 2023-03-14 RX ADMIN — HYDROMORPHONE HYDROCHLORIDE 0.4 MG: 1 INJECTION, SOLUTION INTRAMUSCULAR; INTRAVENOUS; SUBCUTANEOUS at 11:23

## 2023-03-14 RX ADMIN — CEFAZOLIN 3 G: 330 INJECTION, POWDER, FOR SOLUTION INTRAMUSCULAR; INTRAVENOUS at 10:28

## 2023-03-14 RX ADMIN — OXYCODONE HYDROCHLORIDE 5 MG: 5 TABLET ORAL at 21:35

## 2023-03-14 ASSESSMENT — ENCOUNTER SYMPTOMS
FEVER: 0
SHORTNESS OF BREATH: 0

## 2023-03-14 ASSESSMENT — PAIN SCALES - GENERAL: PAIN_LEVEL: 5

## 2023-03-14 ASSESSMENT — PAIN DESCRIPTION - PAIN TYPE
TYPE: SURGICAL PAIN

## 2023-03-14 NOTE — ANESTHESIA TIME REPORT
Anesthesia Start and Stop Event Times     Date Time Event    3/14/2023 1011 Ready for Procedure     1022 Anesthesia Start     1100 Anesthesia Stop        Responsible Staff  03/14/23    Name Role Begin End    Howard Marc M.D. Anesth 1022 1100        Overtime Reason:  no overtime (within assigned shift)    Comments:

## 2023-03-14 NOTE — PROGRESS NOTES
Infectious Disease Progress Note    Author: Delores Fields M.D. Date & Time of service: 3/14/2023  9:24 AM    Chief Complaint:   infected ankle after ORIF     Interval History:  47 y.o. male admitted 3/12/2023 for drainage and swelling left ankle   3/14 AF WBC 6.8 creat 1.01 plan for OR today culture now also enteric elisa BP remain poorly controlled  Labs Reviewed and Medications Reviewed.    Review of Systems:  Review of Systems   Constitutional:  Negative for fever.   Respiratory:  Negative for shortness of breath.      Hemodynamics:  Temp (24hrs), Av.4 °C (97.6 °F), Min:36.3 °C (97.3 °F), Max:36.8 °C (98.2 °F)  Temperature: 36.3 °C (97.3 °F), Monitored Temp: 37 °C (98.6 °F)  Pulse  Av.8  Min: 72  Max: 110   Blood Pressure: (!) 204/124       Physical Exam:  Physical Exam  Vitals and nursing note reviewed.   Eyes:      General: No scleral icterus.  Cardiovascular:      Rate and Rhythm: Normal rate.   Pulmonary:      Effort: Pulmonary effort is normal. No respiratory distress.   Abdominal:      General: There is no distension.   Musculoskeletal:      Comments: Left ankle dressed       Meds:    Current Facility-Administered Medications:     [MAR Hold] LORazepam    [MAR Hold] hydrALAZINE    [MAR Hold] lidocaine    [MAR Hold] amLODIPine    [MAR Hold] isosorbide mononitrate SR    [MAR Hold] furosemide    [MAR Hold] labetalol    [MAR Hold] atorvastatin    [MAR Hold] spironolactone    [MAR Hold] senna-docusate **AND** [MAR Hold] polyethylene glycol/lytes **AND** [MAR Hold] magnesium hydroxide **AND** [MAR Hold] bisacodyl    [MAR Hold] acetaminophen    [MAR Hold] ondansetron    [MAR Hold] ondansetron    [MAR Hold] promethazine    [MAR Hold] promethazine    [MAR Hold] prochlorperazine    [Held by provider] ampicillin-sulbactam (UNASYN) IV    [MAR Hold] nicotine    [MAR Hold] oxyCODONE immediate-release    Labs:  Recent Labs     23  0654 23  0119   WBC 7.0 6.8   RBC 6.08 5.26   HEMOGLOBIN 17.0 14.2    HEMATOCRIT 50.5 43.0   MCV 83.1 81.7   MCH 28.0 27.0   RDW 42.2 41.5   PLATELETCT 232 198   MPV 9.9 9.5   NEUTSPOLYS 61.40  --    LYMPHOCYTES 25.40  --    MONOCYTES 7.60  --    EOSINOPHILS 4.00  --    BASOPHILS 0.70  --      Recent Labs     03/12/23  0654 03/14/23  0119   SODIUM 143 141   POTASSIUM 3.6 3.6   CHLORIDE 105 104   CO2 28 26   GLUCOSE 103* 117*   BUN 16 17     Recent Labs     03/12/23  0654 03/14/23  0119   CREATININE 1.02 1.01       Imaging:  DX-ANKLE 3+ VIEWS LEFT    Result Date: 3/12/2023  3/12/2023 7:18 AM HISTORY/REASON FOR EXAM: Atraumatic Pain/Swelling/Deformity; ORIF 5 weeks ago; now with wound infection TECHNIQUE/EXAM DESCRIPTION:  AP, lateral, and oblique views of the 3 ankle. COMPARISON:  February 4, 2023 FINDINGS: Interval postsurgical changes of fibular ORIF are seen. There is small posterior malleolus fracture visualized. Bony fragments adjacent to the medial malleolus are seen. Soft tissue swelling of the ankle is noted.     1.  Postoperative changes of distal fibular ORIF. 2.  Small bony fragments adjacent to the medial malleolus and small posterior malleolus fracture fragment are seen likely from prior injury.     DX-ANKLE 3+ VIEWS LEFT    Result Date: 2/21/2023  Plain film x-ray taken today independently reviewed by myself include Ap oblique and lateral views left ankle demonstrating status post ORIF left lateral malleolus ankle fracture and left syndesmosis open repair and fixation.  Hardware is in good position and intact. Anatomic alignment..       Micro:  Results       Procedure Component Value Units Date/Time    CULTURE WOUND W/ GRAM STAIN [[id number]] Collected: 03/12/23 3362    Order Status: Completed Specimen: Wound from Left Ankle Updated: 03/13/23 4040     Significant Indicator NEG     Source WND     Site LEFT ANKLE     Culture Result Light growth mixed enteric elisa.     Gram Stain Result No WBCs seen.  Few Gram positive cocci.      MRSA By PCR (Amp) [[id number]]     Order  "Status: No result Specimen: Respirate from Nares     BLOOD CULTURE x2 [[id number]] Collected: 03/12/23 0654    Order Status: Completed Specimen: Blood from Peripheral Updated: 03/13/23 0616     Significant Indicator NEG     Source BLD     Site PERIPHERAL     Culture Result No Growth  Note: Blood cultures are incubated for 5 days and  are monitored continuously.Positive blood cultures  are called to the RN and reported as soon as  they are identified.      Narrative:      Per Hospital Policy: Only change Specimen Src: to \"Line\" if  specified by physician order.  No site indicated    BLOOD CULTURE x2 [[id number]] Collected: 03/12/23 0736    Order Status: Completed Specimen: Blood from Peripheral Updated: 03/13/23 0616     Significant Indicator NEG     Source BLD     Site PERIPHERAL     Culture Result No Growth  Note: Blood cultures are incubated for 5 days and  are monitored continuously.Positive blood cultures  are called to the RN and reported as soon as  they are identified.      Narrative:      Per Hospital Policy: Only change Specimen Src: to \"Line\" if  specified by physician order.  Left AC    GRAM STAIN [[id number]] Collected: 03/12/23 0730    Order Status: Completed Specimen: Wound Updated: 03/12/23 1803     Significant Indicator .     Source WND     Site LEFT ANKLE     Gram Stain Result No WBCs seen.  Few Gram positive cocci.              Assessment:  Active Hospital Problems    Diagnosis     *Postoperative wound infection [T81.49XA]     Pseudogout [M11.20]     Dependence on nicotine from cigarettes [F17.210]     Chronic systolic CHF (congestive heart failure) (HCC) [I50.22]     Hypertensive urgency [I16.0]      Infected Left ORIF site with hardware in place  CHF  Noncompliance BP meds/uncontrolled HTN        PLAN:   FU cultures  After I and D, start vancomycin and Zosyn  At risk for multiple pathogens incl MRSA, MRSE, other beta-lactam resistant gram positive bacteria  And enteric GNR  Management BP per PCT: up " to 204/124 high risk for CVA, myocardial damage etc    [facility] Ortho

## 2023-03-14 NOTE — CARE PLAN
The patient is Stable - Low risk of patient condition declining or worsening    Shift Goals  Clinical Goals: Pain control  Patient Goals: Rest  Family Goals: not present    Progress made toward(s) clinical / shift goals:    Problem: Knowledge Deficit - Standard  Goal: Patient and family/care givers will demonstrate understanding of plan of care, disease process/condition, diagnostic tests and medications  Outcome: Progressing     Problem: Pain - Standard  Goal: Alleviation of pain or a reduction in pain to the patient’s comfort goal  Outcome: Progressing       Patient is not progressing towards the following goals:

## 2023-03-14 NOTE — PROGRESS NOTES
4 Eyes Skin Assessment Completed by LUIS Mclain and LUIS Aguilar.    Head WDL  Ears WDL  Nose WDL  Mouth WDL  Neck WDL  Breast/Chest Scar  Shoulder Blades WDL  Spine WDL  (R) Arm/Elbow/Hand WDL  (L) Arm/Elbow/Hand WDL  Abdomen Scars  Groin WDL  Scrotum/Coccyx/Buttocks Scabs  (R) Leg Abrasions, scars  (L) Leg Scars on shin, wound on left ankle (surgical dressing CDand I)  (R) Heel/Foot/Toe dryness  (L) Heel/Foot/Toe Scar left heel          Devices In Places piv, nasal cannula        Interventions In Place Pressure Redistribution Mattress    Possible Skin Injury No    Pictures Uploaded Into Epic Yes (wound on left ankle, already in EPIC)  Wound Consult Placed N/A  RN Wound Prevention Protocol Ordered No

## 2023-03-14 NOTE — DISCHARGE PLANNING
note:  Discussed with IDT, pt for surgery today. Per MD, pt is not medically clear. Will need PT/OT eval and recs.

## 2023-03-14 NOTE — PROGRESS NOTES
0710 Patient's in bed. Bedside report received from STEPAHNIE Allan RN at the beginning of the shift.     0730 Patient's sitting EOB. Educated on the importance/use of IS at les 10x every hour while awake, able to reach 4000. Remains NPO, sips with meds as per order. Fall protocol in effect. Call light within reach. Reminded patient to call for assist Assessment completed. No distress noted. Plan of care reviewed with the patient. Verbalized understanding.    0753 Voalted Dr Moss of elevated bp - 193/124.    0759 Notified Marie Pre Op RN regarding elevated BP.    0809 New order received and acknowledged from Dr Charlton (see MAR).    0810 Re checked /105, notified Dr Charlton.    0825 Received a call fro Indira Salazar Op that patient is going to surgery.    0827 Dr Charlton visited. POC discussed with the patient.     0835 Patient left for surgery via bed accompanied by one female ane one male Transports.     1243 Report received from So PACU RN.    1255 Received patient from PACU via bed accompanied by one female Transport.    1325 Patient's in bed. No distress noted.     1535  Patient's in bed. No distress noted.    1623 Medicated with Oxycodone (see MAR) for c/o's left ankle pain, rates pain 5/10.    1925 Patient's in bed. No changes in status. Bedside report given to Oncmaile BROWN RN (Mari).

## 2023-03-14 NOTE — OP REPORT
DATE: 3/14/2023    PREOPERATIVE DIAGNOSIS: 8 cm wound dehiscence    POSTOPERATIVE DIAGNOSIS: Same    PROCEDURE PERFORMED:  Irrigation and debridement and secondary closure of 8 cm wound dehiscence                                                    SURGEON: Jorge Persaud M.D.     ASSISTANT: Tejas Hanna    ANESTHESIOLOGIST: Howard Allen    ANESTHESIA: General    ESTIMATED BLOOD LOSS: 0 mL    INDICATIONS: The patient is a 47 y.o. male with a left ankle wound dehiscence after ORIF almost 6 weeks ago.  I discussed the risks and benefits of the procedure, including the risks of infection, wound healing complication, neurovascular injury, need for repeat irrigation and debridement and the medical risks of anesthesia including DVT, PE, MI, stroke, and death.  Benefits include eradication of infection and closure of wound.  Alternatives to surgery were also discussed, including non-operative management.  The patient signed the informed consent and the operative extremity was marked.      PROCEDURE:  The patient underwent anesthesia, and was positioned supine on the operating room table and all bony prominences were well padded.  Preoperative antibiotics were held until cultures could be obtained. Sequential compression devices were employed. The correct operative site was prepped and draped into a sterile field. A procedural pause was conducted to verify correct patient, correct extremity, presence of the surgeons initials on the operative extremity.    The wound dehiscence was debrided in excisional fashion with knife and rongeur of skin, subcutaneous tissue, and underlying muscle down to healthy tissue. Deep cultures were taken and sent to lab for analysis. The  wound was irrigated with 3 L of saline solution. The wound was closed in layers with 2-0 Vicryl and subcutaneous tissues and 2-0 Nylon in the skin.  Sterile dressings were applied.     The patient tolerated the procedure well. There were no apparent  complications. All sponge, needle, and instrument counts were correct on two separate occasions. They were awakened, extubated, and transferred to the recovery room in satisfactory condition.     The use of Tejas Hanna as a surgical assistant was necessary for assistance with exposure, retraction, and closure.    Post-Operative Plan:    1.  The patient should be full weightbearing on their operative extremity.    2.  IV antibiotics - will be given postoperatively and adjusted by the Infectious Disease service as dictated by culture results.    ____________________________________   Jorge Persaud M.D.   DD: 3/14/2023  10:52 AM

## 2023-03-14 NOTE — CARE PLAN
The patient is Stable - Low risk of patient condition declining or worsening    Shift Goals  Clinical Goals: pain control, surgery, BP WDL  Patient Goals: pain control, surgery  Family Goals: no family present    Progress made toward(s) clinical / shift goals:      Problem: Knowledge Deficit - Standard  Goal: Patient and family/care givers will demonstrate understanding of plan of care, disease process/condition, diagnostic tests and medications  Outcome: Progressing  Note: POC discussed with the patient. For I and D today. Questions answered. Verbalized understanding.     Problem: Pain - Standard  Goal: Alleviation of pain or a reduction in pain to the patient’s comfort goal  Outcome: Progressing  Note: Educated on radha scale. Encourrged to verbalize pain. Will medicate as per MAR.       Patient is not progressing towards the following goals:

## 2023-03-14 NOTE — WOUND TEAM
Wound team consulted for L ankle, patient going to surgery for for L ankle.  Wound consult completed. Please re-consult if there is a need.

## 2023-03-14 NOTE — ANESTHESIA POSTPROCEDURE EVALUATION
Patient: Gage Garcia    Procedure Summary     Date: 03/14/23 Room / Location: [place] [address] / SURGERY [facility]    Anesthesia Start: 1022 Anesthesia Stop: 1100    Procedure: IRRIGATION AND DEBRIDEMENT, LEFT ANKLE (Left: Ankle) Diagnosis: (left ankle post-operative infection)    Surgeons: Jorge Persaud M.D. Responsible Provider: Howard Marc M.D.    Anesthesia Type: general ASA Status: 3          Final Anesthesia Type: general  Last vitals  BP   Blood Pressure: 127/87    Temp   36.8 °C (98.2 °F)    Pulse   88   Resp   18    SpO2   93 %      Anesthesia Post Evaluation    Patient location during evaluation: PACU  Patient participation: complete - patient participated  Level of consciousness: awake and alert  Pain score: 5    Airway patency: patent  Anesthetic complications: no  Cardiovascular status: hemodynamically stable  Respiratory status: acceptable  Hydration status: euvolemic    PONV: none          There were no known notable events for this encounter.     Nurse Pain Score: 5 (NPRS)

## 2023-03-14 NOTE — OR NURSING
Patient awake and alert and resting in bed. Pain has decreased to a tolerable level post pain medication administration. VSS. Pt denies nausea at this time. Pt resting in bed and has been updated on plan of care.     Report called to Rayne SMITH. Pt taken to room in stable condition.

## 2023-03-14 NOTE — ANESTHESIA PREPROCEDURE EVALUATION
Case: [id number] Date/Time: 03/14/23 0915    Procedure: IRRIGATION AND DEBRIDEMENT, LEFT ANKLE (Left: Ankle)    Anesthesia type: General    Pre-op diagnosis: left ankle post-operative infection    Location: [facility] OR  / SURGERY [facility]    Surgeons: Jorge Persaud M.D.          Relevant Problems   CARDIAC   (positive) Hypertensive urgency      Other   (positive) Chronic systolic CHF (congestive heart failure) (HCC)   (positive) Postoperative wound infection       Physical Exam    Airway   Mallampati: II  TM distance: >3 FB  Neck ROM: full       Cardiovascular - normal exam  Rhythm: regular  Rate: normal  (-) murmur     Dental - normal exam           Pulmonary - normal exam  Breath sounds clear to auscultation     Abdominal    Neurological - normal exam                 Anesthesia Plan    ASA 3   ASA physical status 3 criteria: moderate reduction of ejection fraction    Plan - general       Airway plan will be LMA          Induction: intravenous    Postoperative Plan: Postoperative administration of opioids is intended.    Pertinent diagnostic labs and testing reviewed    Informed Consent:    Anesthetic plan and risks discussed with patient.    Use of blood products discussed with: patient whom consented to blood products.

## 2023-03-14 NOTE — ANESTHESIA PROCEDURE NOTES
Airway    Date/Time: 3/14/2023 10:30 AM  Performed by: Howard Marc M.D.  Authorized by: Howard Marc M.D.     Location:  OR  Urgency:  Elective  Indications for Airway Management:  Anesthesia      Spontaneous Ventilation: absent    Sedation Level:  Deep  Preoxygenated: Yes    Mask Difficulty Assessment:  0 - not attempted  Final Airway Type:  Supraglottic airway  Final Supraglottic Airway:  Standard LMA    SGA Size:  5  Number of Attempts at Approach:  1  Number of Other Approaches Attempted:  0

## 2023-03-15 LAB
ALBUMIN SERPL BCP-MCNC: 3.9 G/DL (ref 3.2–4.9)
BUN SERPL-MCNC: 25 MG/DL (ref 8–22)
CALCIUM ALBUM COR SERPL-MCNC: 9 MG/DL (ref 8.5–10.5)
CALCIUM SERPL-MCNC: 8.9 MG/DL (ref 8.5–10.5)
CHLORIDE SERPL-SCNC: 102 MMOL/L (ref 96–112)
CO2 SERPL-SCNC: 23 MMOL/L (ref 20–33)
CREAT SERPL-MCNC: 1.28 MG/DL (ref 0.5–1.4)
ERYTHROCYTE [DISTWIDTH] IN BLOOD BY AUTOMATED COUNT: 41.7 FL (ref 35.9–50)
FUNGUS SPEC FUNGUS STN: NORMAL
GFR SERPLBLD CREATININE-BSD FMLA CKD-EPI: 69 ML/MIN/1.73 M 2
GLUCOSE SERPL-MCNC: 117 MG/DL (ref 65–99)
HCT VFR BLD AUTO: 42.9 % (ref 42–52)
HGB BLD-MCNC: 14.8 G/DL (ref 14–18)
MAGNESIUM SERPL-MCNC: 2.1 MG/DL (ref 1.5–2.5)
MCH RBC QN AUTO: 28.1 PG (ref 27–33)
MCHC RBC AUTO-ENTMCNC: 34.5 G/DL (ref 33.7–35.3)
MCV RBC AUTO: 81.4 FL (ref 81.4–97.8)
PHOSPHATE SERPL-MCNC: 3.6 MG/DL (ref 2.5–4.5)
PLATELET # BLD AUTO: 199 K/UL (ref 164–446)
PMV BLD AUTO: 10.2 FL (ref 9–12.9)
POTASSIUM SERPL-SCNC: 4.1 MMOL/L (ref 3.6–5.5)
RBC # BLD AUTO: 5.27 M/UL (ref 4.7–6.1)
RHODAMINE-AURAMINE STN SPEC: NORMAL
SIGNIFICANT IND 70042: NORMAL
SIGNIFICANT IND 70042: NORMAL
SITE SITE: NORMAL
SITE SITE: NORMAL
SODIUM SERPL-SCNC: 137 MMOL/L (ref 135–145)
SOURCE SOURCE: NORMAL
SOURCE SOURCE: NORMAL
WBC # BLD AUTO: 10.3 K/UL (ref 4.8–10.8)

## 2023-03-15 PROCEDURE — 700111 HCHG RX REV CODE 636 W/ 250 OVERRIDE (IP): Performed by: INTERNAL MEDICINE

## 2023-03-15 PROCEDURE — 700102 HCHG RX REV CODE 250 W/ 637 OVERRIDE(OP): Performed by: STUDENT IN AN ORGANIZED HEALTH CARE EDUCATION/TRAINING PROGRAM

## 2023-03-15 PROCEDURE — 85027 COMPLETE CBC AUTOMATED: CPT

## 2023-03-15 PROCEDURE — 700101 HCHG RX REV CODE 250: Performed by: STUDENT IN AN ORGANIZED HEALTH CARE EDUCATION/TRAINING PROGRAM

## 2023-03-15 PROCEDURE — 99232 SBSQ HOSP IP/OBS MODERATE 35: CPT | Performed by: INTERNAL MEDICINE

## 2023-03-15 PROCEDURE — A9270 NON-COVERED ITEM OR SERVICE: HCPCS | Performed by: STUDENT IN AN ORGANIZED HEALTH CARE EDUCATION/TRAINING PROGRAM

## 2023-03-15 PROCEDURE — 700105 HCHG RX REV CODE 258: Performed by: INTERNAL MEDICINE

## 2023-03-15 PROCEDURE — 770001 HCHG ROOM/CARE - MED/SURG/GYN PRIV*

## 2023-03-15 PROCEDURE — A9270 NON-COVERED ITEM OR SERVICE: HCPCS | Performed by: HOSPITALIST

## 2023-03-15 PROCEDURE — 700111 HCHG RX REV CODE 636 W/ 250 OVERRIDE (IP): Performed by: HOSPITALIST

## 2023-03-15 PROCEDURE — 700102 HCHG RX REV CODE 250 W/ 637 OVERRIDE(OP): Performed by: HOSPITALIST

## 2023-03-15 PROCEDURE — 700105 HCHG RX REV CODE 258: Performed by: HOSPITALIST

## 2023-03-15 PROCEDURE — 83735 ASSAY OF MAGNESIUM: CPT

## 2023-03-15 PROCEDURE — 80069 RENAL FUNCTION PANEL: CPT

## 2023-03-15 PROCEDURE — 36415 COLL VENOUS BLD VENIPUNCTURE: CPT

## 2023-03-15 PROCEDURE — 99233 SBSQ HOSP IP/OBS HIGH 50: CPT | Performed by: HOSPITALIST

## 2023-03-15 RX ORDER — LISINOPRIL 20 MG/1
20 TABLET ORAL TWICE DAILY
Status: DISCONTINUED | OUTPATIENT
Start: 2023-03-15 | End: 2023-03-17

## 2023-03-15 RX ORDER — CARVEDILOL 6.25 MG/1
12.5 TABLET ORAL ONCE
Status: COMPLETED | OUTPATIENT
Start: 2023-03-15 | End: 2023-03-15

## 2023-03-15 RX ORDER — ISOSORBIDE MONONITRATE 60 MG/1
60 TABLET, EXTENDED RELEASE ORAL DAILY
Status: DISCONTINUED | OUTPATIENT
Start: 2023-03-16 | End: 2023-03-18 | Stop reason: HOSPADM

## 2023-03-15 RX ORDER — CARVEDILOL 6.25 MG/1
25 TABLET ORAL 2 TIMES DAILY WITH MEALS
Status: DISCONTINUED | OUTPATIENT
Start: 2023-03-15 | End: 2023-03-18 | Stop reason: HOSPADM

## 2023-03-15 RX ADMIN — ATORVASTATIN CALCIUM 10 MG: 10 TABLET, FILM COATED ORAL at 21:17

## 2023-03-15 RX ADMIN — CARVEDILOL 25 MG: 6.25 TABLET, FILM COATED ORAL at 18:08

## 2023-03-15 RX ADMIN — SPIRONOLACTONE 25 MG: 25 TABLET ORAL at 04:20

## 2023-03-15 RX ADMIN — ISOSORBIDE MONONITRATE 30 MG: 30 TABLET, EXTENDED RELEASE ORAL at 04:24

## 2023-03-15 RX ADMIN — OXYCODONE HYDROCHLORIDE 5 MG: 5 TABLET ORAL at 04:19

## 2023-03-15 RX ADMIN — FUROSEMIDE 20 MG: 20 TABLET ORAL at 21:17

## 2023-03-15 RX ADMIN — FUROSEMIDE 20 MG: 20 TABLET ORAL at 11:11

## 2023-03-15 RX ADMIN — LABETALOL HYDROCHLORIDE 10 MG: 5 INJECTION INTRAVENOUS at 21:17

## 2023-03-15 RX ADMIN — HYDRALAZINE HYDROCHLORIDE 10 MG: 20 INJECTION INTRAMUSCULAR; INTRAVENOUS at 06:20

## 2023-03-15 RX ADMIN — PIPERACILLIN AND TAZOBACTAM 4.5 G: 4; .5 INJECTION, POWDER, LYOPHILIZED, FOR SOLUTION INTRAVENOUS; PARENTERAL at 23:52

## 2023-03-15 RX ADMIN — LABETALOL HYDROCHLORIDE 10 MG: 5 INJECTION INTRAVENOUS at 05:34

## 2023-03-15 RX ADMIN — LISINOPRIL 20 MG: 20 TABLET ORAL at 18:10

## 2023-03-15 RX ADMIN — LISINOPRIL 20 MG: 20 TABLET ORAL at 09:00

## 2023-03-15 RX ADMIN — CARVEDILOL 25 MG: 6.25 TABLET, FILM COATED ORAL at 08:59

## 2023-03-15 RX ADMIN — VANCOMYCIN HYDROCHLORIDE 1750 MG: 500 INJECTION, POWDER, LYOPHILIZED, FOR SOLUTION INTRAVENOUS at 00:19

## 2023-03-15 RX ADMIN — VANCOMYCIN HYDROCHLORIDE 1750 MG: 500 INJECTION, POWDER, LYOPHILIZED, FOR SOLUTION INTRAVENOUS at 12:34

## 2023-03-15 RX ADMIN — CARVEDILOL 12.5 MG: 6.25 TABLET, FILM COATED ORAL at 08:58

## 2023-03-15 RX ADMIN — AMLODIPINE BESYLATE 10 MG: 10 TABLET ORAL at 04:20

## 2023-03-15 RX ADMIN — PIPERACILLIN AND TAZOBACTAM 4.5 G: 4; .5 INJECTION, POWDER, LYOPHILIZED, FOR SOLUTION INTRAVENOUS; PARENTERAL at 07:36

## 2023-03-15 RX ADMIN — HYDRALAZINE HYDROCHLORIDE 10 MG: 20 INJECTION INTRAMUSCULAR; INTRAVENOUS at 15:53

## 2023-03-15 RX ADMIN — ACETAMINOPHEN 650 MG: 325 TABLET, FILM COATED ORAL at 09:02

## 2023-03-15 RX ADMIN — PIPERACILLIN AND TAZOBACTAM 4.5 G: 4; .5 INJECTION, POWDER, LYOPHILIZED, FOR SOLUTION INTRAVENOUS; PARENTERAL at 15:58

## 2023-03-15 ASSESSMENT — ENCOUNTER SYMPTOMS
FEVER: 0
HEMOPTYSIS: 0
BLURRED VISION: 0
COUGH: 0
SORE THROAT: 0
ABDOMINAL PAIN: 0
SHORTNESS OF BREATH: 0
NAUSEA: 0
MYALGIAS: 1
HEARTBURN: 0
DOUBLE VISION: 0
NERVOUS/ANXIOUS: 1
VOMITING: 0

## 2023-03-15 ASSESSMENT — PAIN DESCRIPTION - PAIN TYPE
TYPE: SURGICAL PAIN

## 2023-03-15 NOTE — PROGRESS NOTES
"      Orthopaedic Progress Note    Interval changes:  Patient doing well post op  Left ankle operative cultures with NGTD- ID team following for recs  Cleared for DC by ortho pending ID and medicine team clearance    ROS - Patient denies any new issues.  Pain well controlled.    BP (!) 156/67   Pulse 81   Temp 36.7 °C (98 °F) (Temporal)   Resp 18   Ht 2.083 m (6' 10\")   Wt (!) 166 kg (365 lb 15.4 oz)   SpO2 93%       Patient seen and examined  No acute distress  Breathing non labored  RRR  LLE dressing CDI, DNVI, moves all toes, cap refill <2 sec.      Recent Labs     03/14/23  0119 03/15/23  0008   WBC 6.8 10.3   RBC 5.26 5.27   HEMOGLOBIN 14.2 14.8   HEMATOCRIT 43.0 42.9   MCV 81.7 81.4   MCH 27.0 28.1   MCHC 33.0* 34.5   RDW 41.5 41.7   PLATELETCT 198 199   MPV 9.5 10.2       Active Hospital Problems    Diagnosis     Postoperative wound infection [T81.49XA]     Pseudogout [M11.20]     Dependence on nicotine from cigarettes [F17.210]     Chronic systolic CHF (congestive heart failure) (HCC) [I50.22]      Chronic systolic CHF, etiology unknown, first noted 07/2022 presented at ED with SOB, PND and noted to have hypertensive emergency. Initial echocardiogram showed LVEF 30%.     Last Echo 02/2023 :   \"Contrast offered but pt refused.  Technically difficult due to body habtus, positioning and pt left leg   in boot from previous surgery but adequate study for interpretation.   Moderately reduced left ventricular systolic function.  The left ventricular ejection fraction is visually estimated to be 40%.  Moderate concentric left ventricular hypertrophy.  Enlarged left ventricular chamber size.  Grade I diastolic dysfunction.  Normal right ventricular size and systolic function.  No significant valvular stenosis or regurgitation seen.  Unable to estimate right sided intracardiac pressures.\"      Hypertensive urgency [I16.0]      Hx of hypertensive urgency and hx HFrEF 07/2020 ( 30%, improved 2023)  - current " discharged on   amlodipine 10mg,   carvedilol 12.5mg BID,   lisinopril 20mg BID,   imdur 30mg daily,   spironolactone 25mg daily   Furosemide 20mg BID    Previously taking ibuprofen 800mg for headache     02/13/2023  - discussed sleep study- patient would like to defer until financial  - discussed secondary workup for HTN however patient would like to defer at this time         Assessment/Plan:  Patient doing well post op  Left ankle operative cultures with NGTD- ID team following for recs  Cleared for DC by ortho pending ID and medicine team clearance  POD#1 S/P Irrigation and debridement and secondary closure of 8 cm wound dehiscence                                                  Wt bearing status - WBAT operative side  Wound care/Drains - Dressings to be changed every other day by nursing  Future Procedures - none planned   Lovenox: Start ok per ortho, Duration-until ambulatory > 150'  Sutures/Staples out- 14 days post operatively  PT/OT-initiated  Antibiotics: Zosyn 4.5g IV q8, vanco 1750 mg IV Q12  DVT Prophylaxis- TEDS/SCDs/Foot pumps  Cordero-none  Case Coordination for Discharge Planning - Disposition per abx needs

## 2023-03-15 NOTE — PROGRESS NOTES
Pharmacy Vancomycin Kinetics Note for 3/14/2023     47 y.o. male on Vancomycin day #  1     Vancomycin Indication (AUC Dosing): Skin/skin structure infection (post op infection)    Provider specified end date:  3/28/23    Active Antibiotics (From admission, onward)      Ordered     Ordering Provider       Tue Mar 14, 2023 11:06 AM    03/14/23 1106  vancomycin (VANCOCIN) 3 g in  mL IVPB  (vancomycin (VANCOCIN) IV (LD + Maintenance))  ONCE         Delores Fields M.D.       Tue Mar 14, 2023  9:29 AM    03/14/23 0929  MD Alert...Vancomycin per Pharmacy  PHARMACY TO DOSE        Question:  Indication(s) for vancomycin?  Answer:  Osteomyelitis    Delores Fields M.D.    03/14/23 0929  piperacillin-tazobactam (Zosyn) 4.5 g in  mL IVPB  (piperacillin-tazobactam (ZOSYN) IV (Extended-infusion) PANEL )  EVERY 8 HOURS        See [facility] for full Linked Orders Report.    Delores Fields M.D.            Dosing Weight: 166 kg (365 lb 15.4 oz)      Admission History: Admitted on 3/12/2023 for Postoperative wound infection [T81.49XA]  Pertinent history: recently admitted here from 2/4/2023 through 2/9/2023 with open ankle fracture for which she eventually went to the operating room with Dr. Persaud on 2/4/2023 and underwent irrigation and debridement with internal fixation    Allergies:     Patient has no known allergies.     Pertinent cultures to date:     Results       Procedure Component Value Units Date/Time    CULTURE TISSUE W/ GRM STAIN [[id number]] Collected: 03/14/23 1040    Order Status: No result Specimen: Tissue Updated: 03/14/23 1404     Significant Indicator NEG     Source TISS     Site Left Ankle     Culture Result -     Gram Stain Result -    Narrative:      Surgery Specimen    Anaerobic Culture [[id number]] Collected: 03/14/23 1040    Order Status: No result Specimen: Tissue Updated: 03/14/23 1404     Significant Indicator NEG     Source TISS     Site Left Ankle     Culture Result -     Narrative:      Surgery Specimen    Fungal Culture [[id number]] Collected: 03/14/23 1040    Order Status: Completed Specimen: Tissue Updated: 03/14/23 1404     Significant Indicator NEG     Source TISS     Site Left Ankle     Culture Result Culture in progress.     Fungal Smear Results -    Narrative:      Surgery Specimen    AFB Culture [[id number]] Collected: 03/14/23 1040    Order Status: No result Specimen: Tissue Updated: 03/14/23 1404     Significant Indicator NEG     Source TISS     Site Left Ankle     Culture Result -     AFB Smear Results -    Narrative:      Surgery Specimen    CULTURE WOUND W/ GRAM STAIN [[id number]] Collected: 03/14/23 1040    Order Status: No result Specimen: Wound Updated: 03/14/23 1148     Significant Indicator NEG     Source WND     Site Left Ankle     Culture Result -     Gram Stain Result -    Narrative:      Surgery - swabs received    Anaerobic Culture [[id number]] Collected: 03/14/23 1040    Order Status: No result Specimen: Wound Updated: 03/14/23 1148     Significant Indicator NEG     Source WND     Site Left Ankle     Culture Result -    Narrative:      Surgery - swabs received    CULTURE WOUND W/ GRAM STAIN [[id number]] Collected: 03/12/23 0730    Order Status: Completed Specimen: Wound from Left Ankle Updated: 03/13/23 1543     Significant Indicator NEG     Source WND     Site LEFT ANKLE     Culture Result Light growth mixed enteric elisa.     Gram Stain Result No WBCs seen.  Few Gram positive cocci.      MRSA By PCR (Amp) [[id number]]     Order Status: No result Specimen: Respirate from Nares     BLOOD CULTURE x2 [[id number]] Collected: 03/12/23 0654    Order Status: Completed Specimen: Blood from Peripheral Updated: 03/13/23 0616     Significant Indicator NEG     Source BLD     Site PERIPHERAL     Culture Result No Growth  Note: Blood cultures are incubated for 5 days and  are monitored continuously.Positive blood cultures  are called to the RN and reported as soon as  they are  "identified.      Narrative:      Per Hospital Policy: Only change Specimen Src: to \"Line\" if  specified by physician order.  No site indicated    BLOOD CULTURE x2 [[id number]] Collected: 23    Order Status: Completed Specimen: Blood from Peripheral Updated: 23     Significant Indicator NEG     Source BLD     Site PERIPHERAL     Culture Result No Growth  Note: Blood cultures are incubated for 5 days and  are monitored continuously.Positive blood cultures  are called to the RN and reported as soon as  they are identified.      Narrative:      Per Hospital Policy: Only change Specimen Src: to \"Line\" if  specified by physician order.  Left AC    GRAM STAIN [[id number]] Collected: 23    Order Status: Completed Specimen: Wound Updated: 23     Significant Indicator .     Source WND     Site LEFT ANKLE     Gram Stain Result No WBCs seen.  Few Gram positive cocci.              Labs:     Estimated Creatinine Clearance: 162.4 mL/min (by C-G formula based on SCr of 1.01 mg/dL).  Recent Labs     2354 23  0119   WBC 7.0 6.8   NEUTSPOLYS 61.40  --      Recent Labs     2354 23  0119   BUN 16 17   CREATININE 1.02 1.01       Intake/Output Summary (Last 24 hours) at 3/14/2023 1705  Last data filed at 3/14/2023 1545  Gross per 24 hour   Intake 835.06 ml   Output 1280 ml   Net -444.94 ml      /87   Pulse 88   Temp 36.8 °C (98.2 °F) (Temporal)   Resp 18   Ht 2.083 m (6' 10\")   Wt (!) 166 kg (365 lb 15.4 oz)   SpO2 93%  Temp (24hrs), Av.4 °C (97.5 °F), Min:36.1 °C (97 °F), Max:36.8 °C (98.2 °F)      List concerns for Vancomycin clearance:     CHF, obesity    Pharmacokinetics:     AUC kinetics:   Ke (hr ^-1): 0.1389 hr^-1  Half life: 4.99 hr  Clearance: 6.901  Estimated TDD: 3450.5  Estimated Dose: 1006  Estimated interval: 7      A/P:     -  Vancomycin dose: Vancomycin IVPB 1750 mg q12h (0000/1200)    -  Next vancomycin level(s):    -TBD - at steady " state    -  Predicted vancomycin AUC from initial AUC test calculator: 507 mg·hr/L    -  Comments: s/p I&D 3/14/23    Sylvia Delacruz, PharmD    Addendum:  added therapy day, duration of therapy, and obesity concern

## 2023-03-15 NOTE — CARE PLAN
The patient is Stable - Low risk of patient condition declining or worsening    Shift Goals  Clinical Goals: pain mgmt, mobility  Patient Goals: comfort, rest  Family Goals: not present    Progress made toward(s) clinical / shift goals:    Problem: Knowledge Deficit - Standard  Goal: Patient and family/care givers will demonstrate understanding of plan of care, disease process/condition, diagnostic tests and medications  Outcome: Progressing  Note: Discussed plan and postoperative plan of care with patient. Patient encouraged to communicate needs to staff. Call light is within reach. Patient is A+O x 4 and verbalized understanding.         Patient is not progressing towards the following goals:

## 2023-03-15 NOTE — DISCHARGE PLANNING
Referral sent per choice to [facility] ([facility] extension)    1907- Spoke To: Marlena  Agency/Facility Name: [facility]  Plan or Request: requested order to be hard faxed

## 2023-03-15 NOTE — PROGRESS NOTES
[facility] Medicine Daily Progress Note    Date of Service  3/15/2023    Chief Complaint  Gage Garcia is a 47 y.o. male admitted 3/12/2023 with left ankle post-operative infection.    Hospital Course  This is a 47-year-old male with a past medical history significant for hypertension, pseudogout, cardiomyopathy with a EF of 40% was initially admitted from 2/4 to 2/9, he had surgery with Dr. Burleson on 2/4, orthopedic recommended toe-touch weightbearing on the operative extremity.    He presented to the ER on 3/12 with a left ankle infection.  He had a stated out after 16 days; noted to have purulent drainage from the lateral side of the left ankle.  Blood cultures x2 no growth to date, patient underwent I&D; secondary closure of 8 cm wound dehiscence on 3/14.  Per Ortho patient to be full weightbearing on the operative extremity.  Intraoperative culture pending.    Currently patient is on broad-spectrum antibiotics including vancomycin and Zosyn    Interval events:  -- Patient is noted to be agitated and frustrated early in the morning because he was receiving blood pressure medication.  Later he calmed down.  Currently he is on Coreg 12.5 mg p.o. twice daily, Lasix 20 mg p.o. twice daily, Imdur 30 mg p.o. daily, lisinopril 20 mg p.o. daily, Aldactone 25 mg p.o. daily, amlodipine 10 mg p.o. daily.  His blood pressure has been improving  --Patient underwent surgery today, intraoperative culture pending, will start broad-spectrum antibiotics, ID following, orthopedic surgery following.  --Per orthopedic surgery, patient is weightbearing as tolerated    3/15:  -- No acute events overnight, patient has been stable, but patient blood pressure is still elevated  -Increase the dose of Imdur lisinopril and Coreg  --  patient underwent Surgery, yesterday  intraoperative culture pending, ID following, continue IV antibiotics as per infectious disease recommendation.  Will need pt and ot      I have discussed this patient's  plan of care and discharge plan at IDT rounds today with Case Management, Nursing, Nursing leadership, and other members of the IDT team.    Consultants/Specialty  infectious disease and orthopedics    Code Status  Full Code    Disposition  Patient is not medically cleared for discharge.   Anticipate discharge to to home with organized home healthcare and close outpatient follow-up.  I have placed the appropriate orders for post-discharge needs.    Review of Systems  All 14 systems were reviewed and negative except as mentioned above    Review of Systems   Constitutional:  Positive for malaise/fatigue. Negative for fever.   HENT:  Negative for congestion and sore throat.    Eyes:  Negative for blurred vision and double vision.   Respiratory:  Negative for cough and hemoptysis.    Cardiovascular:  Negative for chest pain.   Gastrointestinal:  Negative for abdominal pain, heartburn, nausea and vomiting.   Musculoskeletal:  Positive for joint pain and myalgias.   Psychiatric/Behavioral:  The patient is nervous/anxious.    All other systems reviewed and are negative.      Physical Exam  Temp:  [36.1 °C (97 °F)-37 °C (98.6 °F)] 36.7 °C (98 °F)  Pulse:  [81-99] 81  Resp:  [17-18] 18  BP: (127-190)/() 156/67  SpO2:  [90 %-93 %] 93 %    Physical Exam  Vitals and nursing note reviewed.   Constitutional:       Appearance: He is obese. He is ill-appearing.   HENT:      Head: Normocephalic and atraumatic.   Eyes:      Extraocular Movements: Extraocular movements intact.      Pupils: Pupils are equal, round, and reactive to light.   Cardiovascular:      Rate and Rhythm: Normal rate and regular rhythm.   Pulmonary:      Effort: Pulmonary effort is normal.      Breath sounds: Normal breath sounds.   Abdominal:      General: Bowel sounds are normal. There is no distension.      Palpations: Abdomen is soft.      Tenderness: There is no abdominal tenderness.   Musculoskeletal:         General: Swelling and tenderness present.       Cervical back: Neck supple.      Comments: Left ankle open wound, swelling, erythema   Neurological:      Mental Status: He is alert and oriented to person, place, and time. Mental status is at baseline.           Fluids    Intake/Output Summary (Last 24 hours) at 3/15/2023 1336  Last data filed at 3/15/2023 0446  Gross per 24 hour   Intake 2123.64 ml   Output 1400 ml   Net 723.64 ml         Laboratory  Recent Labs     03/14/23  0119 03/15/23  0008   WBC 6.8 10.3   RBC 5.26 5.27   HEMOGLOBIN 14.2 14.8   HEMATOCRIT 43.0 42.9   MCV 81.7 81.4   MCH 27.0 28.1   MCHC 33.0* 34.5   RDW 41.5 41.7   PLATELETCT 198 199   MPV 9.5 10.2       Recent Labs     03/14/23  0119 03/15/23  0112   SODIUM 141 137   POTASSIUM 3.6 4.1   CHLORIDE 104 102   CO2 26 23   GLUCOSE 117* 117*   BUN 17 25*   CREATININE 1.01 1.28   CALCIUM 9.0 8.9                     Imaging  DX-ANKLE 3+ VIEWS LEFT   Final Result         1.  Postoperative changes of distal fibular ORIF.   2.  Small bony fragments adjacent to the medial malleolus and small posterior malleolus fracture fragment are seen likely from prior injury.                Assessment/Plan  * Postoperative wound infection- (present on admission)  Assessment & Plan  Per patient he has had blood and purulent drainage from the lateral ankle surgical site.   WBC normal with elevated CRP  IV antibiotics and pain control  Dr. Chance brooke was called from the ER      0n 3/14: Patient underwent surgery, will await culture, ID following, continue IV antibiotics as per infectious disease recommendation  Intra-op cx pending    Pseudogout- (present on admission)  Assessment & Plan  Hx of    Dependence on nicotine from cigarettes- (present on admission)  Assessment & Plan  Cessation again discussed  Adverse effect of smoking including CAD, COPD, CVA, lung cancer Has been explained to the patient; he stated understanding  Provide nicotine patch  More than 3 min were spent in counselling     Chronic  systolic CHF (congestive heart failure) (HCC)- (present on admission)  Assessment & Plan  Last echo Feb had an EF 40%  Without exacerbation  Continue lisinopril 20 mg p.o. daily, Imdur 60 mg.,  Coreg 25 mg p.o. twice daily, Lasix 20 mg p.o. twice daily, Aldactone 25 mg p.o. daily  I/os, cardiac diet    Hypertensive urgency- (present on admission)  Assessment & Plan  Blood pressure improving, continue  1. lisinopril 20 mg p.o.BID  2. Imdur 60 mg p.o. daily  3. Coreg 25 mg p.o. twice john  4., amlodipine 10 mg p.o. daily  5. Lasix 20 mg p.o. twice daily  6. amlodipine 10 mg p.o. daily  7. Aldactone 25 mg p.o. daily  Along with as needed antihypertensive medication         VTE prophylaxis: Lovenox    I have performed a physical exam and reviewed and updated ROS and Plan today (3/15/2023). In review of yesterday's note (3/14/2023), there are no changes except as documented above.

## 2023-03-15 NOTE — CARE PLAN
The patient is Stable - Low risk of patient condition declining or worsening    Shift Goals  Clinical Goals: Pain control, decrease BP, rest  Patient Goals: Pain control, lower BP, rest  Family Goals: No family present    Problem: Knowledge Deficit - Standard  Goal: Patient and family/care givers will demonstrate understanding of plan of care, disease process/condition, diagnostic tests and medications  Outcome: Progressing     Problem: Pain - Standard  Goal: Alleviation of pain or a reduction in pain to the patient’s comfort goal  Outcome: Progressing     Progress made toward(s) clinical / shift goals:  Patient verbalized understanding of plan of care. Patient's pain is managed with current pain medications per MAR. Patient verbalizes understanding to call for assistance before getting out of bed. Skin interventions per flowsheets.    Patient is not progressing towards the following goals:

## 2023-03-15 NOTE — PROGRESS NOTES
Infectious Disease Progress Note    Author: Delores Fields M.D. Date & Time of service: 3/15/2023  1:00 PM    Chief Complaint:   infected ankle after ORIF     Interval History:  47 y.o. male admitted 3/12/2023 for drainage and swelling left ankle   3/14 AF WBC 6.8 creat 1.01 plan for OR today culture now also enteric elisa BP remain poorly controlled  3/15 AF WBC 10.3 sleeping at time of rounds cultures neg Op note reviewed-debrided through muscle. No gaby pus or necrosis  Labs Reviewed and Medications Reviewed.    Review of Systems:  Review of Systems   Unable to perform ROS: Other   Constitutional:  Negative for fever.   Respiratory:  Negative for shortness of breath.      Hemodynamics:  Temp (24hrs), Av.6 °C (97.9 °F), Min:36.1 °C (97 °F), Max:37 °C (98.6 °F)  Temperature: 36.7 °C (98 °F)  Pulse  Av.5  Min: 78  Max: 110   Blood Pressure: (!) 156/67       Physical Exam:  Physical Exam  Vitals and nursing note reviewed.   Constitutional:       General: He is not in acute distress.  Eyes:      General: No scleral icterus.  Cardiovascular:      Rate and Rhythm: Normal rate.   Pulmonary:      Effort: No respiratory distress.   Abdominal:      General: There is no distension.   Musculoskeletal:      Comments: Left ankle dressed       Meds:    Current Facility-Administered Medications:     lisinopril    carvedilol    [START ON 3/16/2023] isosorbide mononitrate SR    LORazepam    hydrALAZINE    lidocaine    MD Alert...Vancomycin per Pharmacy    [COMPLETED] piperacillin-tazobactam **AND** piperacillin-tazobactam    vancomycin    amLODIPine    furosemide    labetalol    atorvastatin    spironolactone    senna-docusate **AND** polyethylene glycol/lytes **AND** magnesium hydroxide **AND** bisacodyl    acetaminophen    ondansetron    ondansetron    promethazine    promethazine    prochlorperazine    nicotine    oxyCODONE immediate-release    Labs:  Recent Labs     23  0119 03/15/23  0008   WBC 6.8 10.3    RBC 5.26 5.27   HEMOGLOBIN 14.2 14.8   HEMATOCRIT 43.0 42.9   MCV 81.7 81.4   MCH 27.0 28.1   RDW 41.5 41.7   PLATELETCT 198 199   MPV 9.5 10.2       Recent Labs     03/14/23  0119 03/15/23  0112   SODIUM 141 137   POTASSIUM 3.6 4.1   CHLORIDE 104 102   CO2 26 23   GLUCOSE 117* 117*   BUN 17 25*       Recent Labs     03/14/23  0119 03/15/23  0112   ALBUMIN  --  3.9   CREATININE 1.01 1.28         Imaging:  DX-ANKLE 3+ VIEWS LEFT    Result Date: 3/12/2023  3/12/2023 7:18 AM HISTORY/REASON FOR EXAM: Atraumatic Pain/Swelling/Deformity; ORIF 5 weeks ago; now with wound infection TECHNIQUE/EXAM DESCRIPTION:  AP, lateral, and oblique views of the 3 ankle. COMPARISON:  February 4, 2023 FINDINGS: Interval postsurgical changes of fibular ORIF are seen. There is small posterior malleolus fracture visualized. Bony fragments adjacent to the medial malleolus are seen. Soft tissue swelling of the ankle is noted.     1.  Postoperative changes of distal fibular ORIF. 2.  Small bony fragments adjacent to the medial malleolus and small posterior malleolus fracture fragment are seen likely from prior injury.     DX-ANKLE 3+ VIEWS LEFT    Result Date: 2/21/2023  Plain film x-ray taken today independently reviewed by myself include Ap oblique and lateral views left ankle demonstrating status post ORIF left lateral malleolus ankle fracture and left syndesmosis open repair and fixation.  Hardware is in good position and intact. Anatomic alignment..       Micro:  Results       Procedure Component Value Units Date/Time    CULTURE TISSUE W/ GRM STAIN [[id number]] Collected: 03/14/23 1040    Order Status: No result Specimen: Tissue Updated: 03/15/23 0828     Significant Indicator NEG     Source TISS     Site Left Ankle     Culture Result -     Gram Stain Result No organisms seen.    Narrative:      Surgery Specimen    Anaerobic Culture [[id number]] Collected: 03/14/23 1040    Order Status: Completed Specimen: Tissue Updated: 03/15/23 0828      Significant Indicator NEG     Source TISS     Site Left Ankle     Culture Result Culture in progress.    Narrative:      Surgery Specimen    Fungal Culture [[id number]] Collected: 03/14/23 1040    Order Status: Completed Specimen: Tissue Updated: 03/15/23 0828     Significant Indicator NEG     Source TISS     Site Left Ankle     Culture Result Culture in progress.     Fungal Smear Results -    Narrative:      Surgery Specimen    AFB Culture [[id number]] Collected: 03/14/23 1040    Order Status: No result Specimen: Tissue Updated: 03/15/23 0828     Significant Indicator NEG     Source TISS     Site Left Ankle     Culture Result -     AFB Smear Results -    Narrative:      Surgery Specimen    CULTURE WOUND W/ GRAM STAIN [[id number]] Collected: 03/14/23 1040    Order Status: No result Specimen: Wound Updated: 03/15/23 0828     Significant Indicator NEG     Source WND     Site Left Ankle     Culture Result -     Gram Stain Result Few WBCs.  No organisms seen.      Narrative:      Surgery - swabs received    Anaerobic Culture [[id number]] Collected: 03/14/23 1040    Order Status: Completed Specimen: Wound Updated: 03/15/23 0828     Significant Indicator NEG     Source WND     Site Left Ankle     Culture Result Culture in progress.    Narrative:      Surgery - swabs received    GRAM STAIN [[id number]] Collected: 03/14/23 1040    Order Status: Completed Specimen: Wound Updated: 03/14/23 1747     Significant Indicator .     Source WND     Site Left Ankle     Gram Stain Result Few WBCs.  No organisms seen.      Narrative:      Surgery - swabs received    GRAM STAIN [[id number]] Collected: 03/14/23 1040    Order Status: Completed Specimen: Tissue Updated: 03/14/23 1747     Significant Indicator .     Source TISS     Site Left Ankle     Gram Stain Result No organisms seen.    Narrative:      Surgery Specimen    CULTURE WOUND W/ GRAM STAIN [[id number]] Collected: 03/12/23 0730    Order Status: Completed Specimen: Wound from Left Ankle  "Updated: 03/14/23 1725     Significant Indicator NEG     Source WND     Site LEFT ANKLE     Culture Result Light growth mixed enteric elisa.     Gram Stain Result No WBCs seen.  Few Gram positive cocci.      MRSA By PCR (Amp) [[id number]]     Order Status: No result Specimen: Respirate from Nares     BLOOD CULTURE x2 [[id number]] Collected: 03/12/23 0654    Order Status: Completed Specimen: Blood from Peripheral Updated: 03/13/23 0616     Significant Indicator NEG     Source BLD     Site PERIPHERAL     Culture Result No Growth  Note: Blood cultures are incubated for 5 days and  are monitored continuously.Positive blood cultures  are called to the RN and reported as soon as  they are identified.      Narrative:      Per Hospital Policy: Only change Specimen Src: to \"Line\" if  specified by physician order.  No site indicated    BLOOD CULTURE x2 [[id number]] Collected: 03/12/23 0736    Order Status: Completed Specimen: Blood from Peripheral Updated: 03/13/23 0616     Significant Indicator NEG     Source BLD     Site PERIPHERAL     Culture Result No Growth  Note: Blood cultures are incubated for 5 days and  are monitored continuously.Positive blood cultures  are called to the RN and reported as soon as  they are identified.      Narrative:      Per Hospital Policy: Only change Specimen Src: to \"Line\" if  specified by physician order.  Left AC    GRAM STAIN [[id number]] Collected: 03/12/23 0730    Order Status: Completed Specimen: Wound Updated: 03/12/23 1803     Significant Indicator .     Source WND     Site LEFT ANKLE     Gram Stain Result No WBCs seen.  Few Gram positive cocci.              Assessment:  Active Hospital Problems    Diagnosis     *Postoperative wound infection [T81.49XA]     Pseudogout [M11.20]     Dependence on nicotine from cigarettes [F17.210]     Chronic systolic CHF (congestive heart failure) ([facility]) [I50.22]     Hypertensive urgency [I16.0]      Infected Left ORIF site with hardware in " place  CHF  Noncompliance BP meds/uncontrolled HTN        PLAN:   FU cultures  Continue vancomycin and Zosyn-gram stain with GPC  At risk for multiple pathogens incl MRSA, MRSE, other beta-lactam resistant gram positive bacteria  And enteric GNR  Management BP per PCT    Prognosis for limb salvage guarded

## 2023-03-15 NOTE — HOSPITAL COURSE
This is a 47-year-old male with a past medical history significant for hypertension, pseudogout, cardiomyopathy with a EF of 40% was initially admitted from 2/4 to 2/9, he had surgery with Dr. Burleson on 2/4, orthopedic recommended toe-touch weightbearing on the operative extremity.    He presented to the ER on 3/12 with a left ankle infection.  He had a stated out after 16 days; noted to have purulent drainage from the lateral side of the left ankle.  Blood cultures x2 no growth to date, patient underwent I&D; secondary closure of 8 cm wound dehiscence on 3/14.  Per Ortho patient to be full weightbearing on the operative extremity.  Intraoperative culture Proteus and MRSA, patient was initially stable respiratory findings.  ID was consulted, ID recommended continue IV daptomycin plus Rocephin 2GM till 4/25/2023 ..  He will be receiving IV antibiotics at the infusion center while the patient remains on IV antibiotics, need CBC, CMP, ESR, CRP, CPK every week.  Patient will follow-up with renal and ID as an outpatient.    Patient does have uncontrolled high blood pressure, he is currently limited milligram p.o. daily, Coreg confirmed on p.o. twice daily, losartan 100 mg p.o. daily, Lasix 20 mg p.o. twice daily, Aldactone 50 mg p.o. daily, Imdur 60 mg p.o. daily  hydralazine 100 mg p.o. 3 times daily along with as needed clonidine.  Patient should follow-up with primary care physician/hypertension specialist for the management of uncontrolled hypertension.  I ordered CT of the renal arteries, he is renal arteries are patent.    Patient is noted to be very adamant about going home.  He is noted to be agitated, not interested in having any further conversation with myself and case Management went to the bedside and updated the plan of care.    ID has given the recommendation on 3/18/2022 in regards to the IV antibiotics.

## 2023-03-15 NOTE — PROGRESS NOTES
[facility] Medicine Daily Progress Note    Date of Service  3/14/2023    Chief Complaint  Gage Garcia is a 47 y.o. male admitted 3/12/2023 with left ankle post-operative infection.    Hospital Course  This is a 47-year-old male with a past medical history significant for hypertension, pseudogout, cardiomyopathy with a EF of 40% was initially admitted from 2/4 to 2/9, he had surgery with Dr. Burleson on 2/4, orthopedic recommended toe-touch weightbearing on the operative extremity.    He presented to the ER on 3/12 with a left ankle infection.  He had a stated out after 16 days; noted to have purulent drainage from the lateral side of the left ankle.  Blood cultures x2 no growth to date, patient underwent I&D; secondary closure of 8 cm wound dehiscence on 3/14.  Per Ortho patient to be full weightbearing on the operative extremity.  Intraoperative culture pending.    Currently patient is on broad-spectrum antibiotics including vancomycin and Zosyn    Interval events:  -- Patient is noted to be agitated and frustrated early in the morning because he was receiving blood pressure medication.  Later he calmed down.  Currently he is on Coreg 12.5 mg p.o. twice daily, Lasix 20 mg p.o. twice daily, Imdur 30 mg p.o. daily, lisinopril 20 mg p.o. daily, Aldactone 25 mg p.o. daily, amlodipine 10 mg p.o. daily.  His blood pressure has been improving  --Patient underwent surgery today, intraoperative culture pending, will start broad-spectrum antibiotics, ID following, orthopedic surgery following.  --Per orthopedic surgery, patient is weightbearing as tolerated      I have discussed this patient's plan of care and discharge plan at IDT rounds today with Case Management, Nursing, Nursing leadership, and other members of the IDT team.    Consultants/Specialty  infectious disease and orthopedics    Code Status  Full Code    Disposition  Patient is not medically cleared for discharge.   Anticipate discharge to to home with organized  home healthcare and close outpatient follow-up.  I have placed the appropriate orders for post-discharge needs.    Review of Systems  All 14 systems were reviewed and negative except as mentioned above    Physical Exam  Temp:  [36.1 °C (97 °F)-36.8 °C (98.2 °F)] 36.8 °C (98.2 °F)  Pulse:  [78-96] 93  Resp:  [12-18] 18  BP: (127-204)/() 136/88  SpO2:  [90 %-98 %] 93 %    Physical Exam  Vitals and nursing note reviewed.   Constitutional:       Appearance: He is obese. He is ill-appearing.   HENT:      Head: Normocephalic and atraumatic.   Eyes:      Extraocular Movements: Extraocular movements intact.      Conjunctiva/sclera: Conjunctivae normal.   Cardiovascular:      Rate and Rhythm: Normal rate and regular rhythm.   Pulmonary:      Effort: Pulmonary effort is normal.      Breath sounds: Normal breath sounds.   Abdominal:      General: Bowel sounds are normal. There is no distension.      Palpations: Abdomen is soft.      Tenderness: There is no abdominal tenderness.   Musculoskeletal:         General: Swelling and tenderness present.      Cervical back: Neck supple.      Comments: Left ankle open wound, swelling, erythema   Neurological:      Mental Status: He is alert and oriented to person, place, and time. Mental status is at baseline.           Fluids    Intake/Output Summary (Last 24 hours) at 3/14/2023 1832  Last data filed at 3/14/2023 1705  Gross per 24 hour   Intake 400 ml   Output 1530 ml   Net -1130 ml         Laboratory  Recent Labs     03/12/23  0654 03/14/23  0119   WBC 7.0 6.8   RBC 6.08 5.26   HEMOGLOBIN 17.0 14.2   HEMATOCRIT 50.5 43.0   MCV 83.1 81.7   MCH 28.0 27.0   MCHC 33.7 33.0*   RDW 42.2 41.5   PLATELETCT 232 198   MPV 9.9 9.5       Recent Labs     03/12/23  0654 03/14/23  0119   SODIUM 143 141   POTASSIUM 3.6 3.6   CHLORIDE 105 104   CO2 28 26   GLUCOSE 103* 117*   BUN 16 17   CREATININE 1.02 1.01   CALCIUM 9.4 9.0                     Imaging  DX-ANKLE 3+ VIEWS LEFT   Final Result          1.  Postoperative changes of distal fibular ORIF.   2.  Small bony fragments adjacent to the medial malleolus and small posterior malleolus fracture fragment are seen likely from prior injury.                Assessment/Plan  * Postoperative wound infection- (present on admission)  Assessment & Plan  Per patient he has had blood and purulent drainage from the lateral ankle surgical site.   WBC normal with elevated CRP  IV antibiotics and pain control  Dr. Chance brooke was called from the ER      0n 3/14: Patient underwent surgery, will await culture, ID following, continue IV antibiotics as per infectious disease recommendation    Pseudogout- (present on admission)  Assessment & Plan  Hx of    Dependence on nicotine from cigarettes- (present on admission)  Assessment & Plan  Cessation again discussed  Adverse effect of smoking including CAD, COPD, CVA, lung cancer Has been explained to the patient; he stated understanding  Provide nicotine patch  More than 3 min were spent in counselling     Chronic systolic CHF (congestive heart failure) (HCC)- (present on admission)  Assessment & Plan  Last echo Feb had an EF 40%  Without exacerbation  Continue lisinopril 20 mg p.o. daily, Imdur 30 mg.,  Coreg 12.5 mg p.o. twice daily, Lasix 20 mg p.o. twice daily, Aldactone 20 mg p.o. daily  I/os, cardiac diet    Hypertensive urgency- (present on admission)  Assessment & Plan  Blood pressure improving, continue  1. lisinopril 20 mg p.o. john  2. Imdur 30 mg p.o. daily  3. Coreg 12.5 mg p.o. twice john  4., amlodipine 10 mg p.o. daily  5. Lasix 20 mg p.o. twice daily  6. amlodipine 10 mg p.o. daily  7. Aldactone 25 mg p.o. daily  Along with as needed antihypertensive medication         VTE prophylaxis: SCDs/TEDs

## 2023-03-16 ENCOUNTER — APPOINTMENT (OUTPATIENT)
Dept: RADIOLOGY | Facility: MEDICAL CENTER | Age: 48
DRG: 857 | End: 2023-03-16
Attending: HOSPITALIST
Payer: COMMERCIAL

## 2023-03-16 LAB
ALBUMIN SERPL BCP-MCNC: 3.8 G/DL (ref 3.2–4.9)
BUN SERPL-MCNC: 17 MG/DL (ref 8–22)
CALCIUM ALBUM COR SERPL-MCNC: 9.1 MG/DL (ref 8.5–10.5)
CALCIUM SERPL-MCNC: 8.9 MG/DL (ref 8.5–10.5)
CHLORIDE SERPL-SCNC: 105 MMOL/L (ref 96–112)
CK SERPL-CCNC: 167 U/L (ref 0–154)
CO2 SERPL-SCNC: 27 MMOL/L (ref 20–33)
CREAT SERPL-MCNC: 1.14 MG/DL (ref 0.5–1.4)
ERYTHROCYTE [DISTWIDTH] IN BLOOD BY AUTOMATED COUNT: 43.3 FL (ref 35.9–50)
GFR SERPLBLD CREATININE-BSD FMLA CKD-EPI: 80 ML/MIN/1.73 M 2
GLUCOSE SERPL-MCNC: 108 MG/DL (ref 65–99)
HCT VFR BLD AUTO: 44.7 % (ref 42–52)
HGB BLD-MCNC: 14.7 G/DL (ref 14–18)
MCH RBC QN AUTO: 27.6 PG (ref 27–33)
MCHC RBC AUTO-ENTMCNC: 32.9 G/DL (ref 33.7–35.3)
MCV RBC AUTO: 83.9 FL (ref 81.4–97.8)
PHOSPHATE SERPL-MCNC: 3.2 MG/DL (ref 2.5–4.5)
PLATELET # BLD AUTO: 210 K/UL (ref 164–446)
PMV BLD AUTO: 9.6 FL (ref 9–12.9)
POTASSIUM SERPL-SCNC: 3.8 MMOL/L (ref 3.6–5.5)
RBC # BLD AUTO: 5.33 M/UL (ref 4.7–6.1)
SODIUM SERPL-SCNC: 142 MMOL/L (ref 135–145)
WBC # BLD AUTO: 7.7 K/UL (ref 4.8–10.8)

## 2023-03-16 PROCEDURE — 36415 COLL VENOUS BLD VENIPUNCTURE: CPT

## 2023-03-16 PROCEDURE — 80069 RENAL FUNCTION PANEL: CPT

## 2023-03-16 PROCEDURE — 99233 SBSQ HOSP IP/OBS HIGH 50: CPT | Performed by: HOSPITALIST

## 2023-03-16 PROCEDURE — 700102 HCHG RX REV CODE 250 W/ 637 OVERRIDE(OP): Performed by: STUDENT IN AN ORGANIZED HEALTH CARE EDUCATION/TRAINING PROGRAM

## 2023-03-16 PROCEDURE — 700102 HCHG RX REV CODE 250 W/ 637 OVERRIDE(OP): Performed by: HOSPITALIST

## 2023-03-16 PROCEDURE — 99233 SBSQ HOSP IP/OBS HIGH 50: CPT | Performed by: INTERNAL MEDICINE

## 2023-03-16 PROCEDURE — 700105 HCHG RX REV CODE 258: Performed by: HOSPITALIST

## 2023-03-16 PROCEDURE — 82550 ASSAY OF CK (CPK): CPT

## 2023-03-16 PROCEDURE — A9270 NON-COVERED ITEM OR SERVICE: HCPCS | Performed by: HOSPITALIST

## 2023-03-16 PROCEDURE — 85027 COMPLETE CBC AUTOMATED: CPT

## 2023-03-16 PROCEDURE — 770001 HCHG ROOM/CARE - MED/SURG/GYN PRIV*

## 2023-03-16 PROCEDURE — 700111 HCHG RX REV CODE 636 W/ 250 OVERRIDE (IP): Performed by: INTERNAL MEDICINE

## 2023-03-16 PROCEDURE — 700105 HCHG RX REV CODE 258: Performed by: INTERNAL MEDICINE

## 2023-03-16 PROCEDURE — 700111 HCHG RX REV CODE 636 W/ 250 OVERRIDE (IP): Performed by: HOSPITALIST

## 2023-03-16 PROCEDURE — A9270 NON-COVERED ITEM OR SERVICE: HCPCS | Performed by: STUDENT IN AN ORGANIZED HEALTH CARE EDUCATION/TRAINING PROGRAM

## 2023-03-16 PROCEDURE — 93975 VASCULAR STUDY: CPT

## 2023-03-16 RX ORDER — CLONIDINE HYDROCHLORIDE 0.1 MG/1
0.1 TABLET ORAL 3 TIMES DAILY PRN
Status: DISCONTINUED | OUTPATIENT
Start: 2023-03-16 | End: 2023-03-18 | Stop reason: HOSPADM

## 2023-03-16 RX ORDER — HYDRALAZINE HYDROCHLORIDE 50 MG/1
100 TABLET, FILM COATED ORAL EVERY 8 HOURS
Status: DISCONTINUED | OUTPATIENT
Start: 2023-03-16 | End: 2023-03-18 | Stop reason: HOSPADM

## 2023-03-16 RX ORDER — HYDRALAZINE HYDROCHLORIDE 50 MG/1
50 TABLET, FILM COATED ORAL EVERY 8 HOURS
Status: DISCONTINUED | OUTPATIENT
Start: 2023-03-16 | End: 2023-03-16

## 2023-03-16 RX ADMIN — HYDRALAZINE HYDROCHLORIDE 50 MG: 50 TABLET, FILM COATED ORAL at 08:26

## 2023-03-16 RX ADMIN — CARVEDILOL 25 MG: 6.25 TABLET, FILM COATED ORAL at 18:26

## 2023-03-16 RX ADMIN — CARVEDILOL 25 MG: 6.25 TABLET, FILM COATED ORAL at 08:26

## 2023-03-16 RX ADMIN — ISOSORBIDE MONONITRATE 60 MG: 60 TABLET, EXTENDED RELEASE ORAL at 05:04

## 2023-03-16 RX ADMIN — CEFEPIME 2 G: 2 INJECTION, POWDER, FOR SOLUTION INTRAVENOUS at 18:30

## 2023-03-16 RX ADMIN — AMLODIPINE BESYLATE 10 MG: 10 TABLET ORAL at 05:04

## 2023-03-16 RX ADMIN — FUROSEMIDE 20 MG: 20 TABLET ORAL at 10:33

## 2023-03-16 RX ADMIN — ATORVASTATIN CALCIUM 10 MG: 10 TABLET, FILM COATED ORAL at 21:39

## 2023-03-16 RX ADMIN — LABETALOL HYDROCHLORIDE 10 MG: 5 INJECTION INTRAVENOUS at 06:00

## 2023-03-16 RX ADMIN — FUROSEMIDE 20 MG: 20 TABLET ORAL at 21:39

## 2023-03-16 RX ADMIN — OXYCODONE HYDROCHLORIDE 5 MG: 5 TABLET ORAL at 02:33

## 2023-03-16 RX ADMIN — ACETAMINOPHEN 650 MG: 325 TABLET, FILM COATED ORAL at 07:27

## 2023-03-16 RX ADMIN — DAPTOMYCIN 1330 MG: 500 INJECTION, POWDER, LYOPHILIZED, FOR SOLUTION INTRAVENOUS at 17:05

## 2023-03-16 RX ADMIN — PIPERACILLIN AND TAZOBACTAM 4.5 G: 4; .5 INJECTION, POWDER, LYOPHILIZED, FOR SOLUTION INTRAVENOUS; PARENTERAL at 08:31

## 2023-03-16 RX ADMIN — HYDRALAZINE HYDROCHLORIDE 100 MG: 50 TABLET, FILM COATED ORAL at 21:39

## 2023-03-16 RX ADMIN — ACETAMINOPHEN 650 MG: 325 TABLET, FILM COATED ORAL at 17:14

## 2023-03-16 RX ADMIN — SPIRONOLACTONE 25 MG: 25 TABLET ORAL at 05:04

## 2023-03-16 RX ADMIN — LISINOPRIL 20 MG: 20 TABLET ORAL at 05:04

## 2023-03-16 RX ADMIN — VANCOMYCIN HYDROCHLORIDE 1750 MG: 500 INJECTION, POWDER, LYOPHILIZED, FOR SOLUTION INTRAVENOUS at 00:06

## 2023-03-16 ASSESSMENT — ENCOUNTER SYMPTOMS
HEMOPTYSIS: 0
COUGH: 0
BLURRED VISION: 0
FEVER: 0
SORE THROAT: 0
SHORTNESS OF BREATH: 0
VOMITING: 0
ABDOMINAL PAIN: 0
MYALGIAS: 1
NAUSEA: 0
NERVOUS/ANXIOUS: 1
HEARTBURN: 0
DOUBLE VISION: 0

## 2023-03-16 ASSESSMENT — PAIN DESCRIPTION - PAIN TYPE
TYPE: SURGICAL PAIN

## 2023-03-16 NOTE — CARE PLAN
The patient is Stable - Low risk of patient condition declining or worsening    Shift Goals  Clinical Goals: IV abx, safety  Patient Goals: Rest, comfort  Family Goals: Not present    Progress made toward(s) clinical / shift goals:  Patient educated on fall protocol and to call before attempting to ambulate. Bed locked in lowest position. Hourly rounding in place. Nonslip socks in use. Floor remains clean and clutter free. Patient's belongings and tray table placed within reach. Call light in place.  Patient remains afebrile on IV abx.     Patient is not progressing towards the following goals:

## 2023-03-16 NOTE — CARE PLAN
The patient is Stable - Low risk of patient condition declining or worsening    Shift Goals  Clinical Goals: BP monitoring; pain control  Patient Goals: rest; comfort  Family Goals: not present    Progress made toward(s) clinical / shift goals:    Problem: Knowledge Deficit - Standard  Goal: Patient and family/care givers will demonstrate understanding of plan of care, disease process/condition, diagnostic tests and medications  Outcome: Progressing     Problem: Pain - Standard  Goal: Alleviation of pain or a reduction in pain to the patient’s comfort goal  Outcome: Progressing     Problem: Fall Risk  Goal: Patient will remain free from falls  Outcome: Progressing       Patient is not progressing towards the following goals:

## 2023-03-16 NOTE — PROGRESS NOTES
"      Orthopaedic Progress Note    Interval changes:  Patient doing well    Left ankle operative cultures positive for klebsiella and enterococcus- ID team following for recs  Cleared for DC by ortho pending ID and medicine team clearance    ROS - Patient denies any new issues.  Pain well controlled.    BP (!) 159/107   Pulse 77   Temp 36.7 °C (98 °F) (Temporal)   Resp 18   Ht 2.083 m (6' 10\")   Wt (!) 166 kg (365 lb 15.4 oz)   SpO2 95%       Patient seen and examined  No acute distress  Breathing non labored  RRR  LLE dressing changed, incision without issue, DNVI, moves all toes, cap refill <2 sec.      Recent Labs     03/14/23  0119 03/15/23  0008 03/16/23  0131   WBC 6.8 10.3 7.7   RBC 5.26 5.27 5.33   HEMOGLOBIN 14.2 14.8 14.7   HEMATOCRIT 43.0 42.9 44.7   MCV 81.7 81.4 83.9   MCH 27.0 28.1 27.6   MCHC 33.0* 34.5 32.9*   RDW 41.5 41.7 43.3   PLATELETCT 198 199 210   MPV 9.5 10.2 9.6       Active Hospital Problems    Diagnosis     Postoperative wound infection [T81.49XA]     Pseudogout [M11.20]     Dependence on nicotine from cigarettes [F17.210]     Chronic systolic CHF (congestive heart failure) (HCC) [I50.22]      Chronic systolic CHF, etiology unknown, first noted 07/2022 presented at ED with SOB, PND and noted to have hypertensive emergency. Initial echocardiogram showed LVEF 30%.     Last Echo 02/2023 :   \"Contrast offered but pt refused.  Technically difficult due to body habtus, positioning and pt left leg   in boot from previous surgery but adequate study for interpretation.   Moderately reduced left ventricular systolic function.  The left ventricular ejection fraction is visually estimated to be 40%.  Moderate concentric left ventricular hypertrophy.  Enlarged left ventricular chamber size.  Grade I diastolic dysfunction.  Normal right ventricular size and systolic function.  No significant valvular stenosis or regurgitation seen.  Unable to estimate right sided intracardiac pressures.\"      " Hypertensive urgency [I16.0]      Hx of hypertensive urgency and hx HFrEF 07/2020 ( 30%, improved 2023)  - current discharged on   amlodipine 10mg,   carvedilol 12.5mg BID,   lisinopril 20mg BID,   imdur 30mg daily,   spironolactone 25mg daily   Furosemide 20mg BID    Previously taking ibuprofen 800mg for headache     02/13/2023  - discussed sleep study- patient would like to defer until financial  - discussed secondary workup for HTN however patient would like to defer at this time         Assessment/Plan:  Patient doing well    Left ankle operative cultures positive for klebsiella and enterococcus- ID team following for recs  Cleared for DC by ortho pending ID and medicine team clearance  POD#2 S/P Irrigation and debridement and secondary closure of 8 cm wound dehiscence                                                  Wt bearing status - WBAT operative side  Wound care/Drains - Dressings to be changed every other day by nursing  Future Procedures - none planned   Lovenox: Start ok per ortho, Duration-until ambulatory > 150'  Sutures/Staples out- 14 days post operatively  PT/OT-initiated  Antibiotics: maxipime 2g IV q12, dapto 1330 mg IV QD  DVT Prophylaxis- TEDS/SCDs/Foot pumps  Cordero-none  Case Coordination for Discharge Planning - Disposition per abx needs

## 2023-03-16 NOTE — PROGRESS NOTES
Infectious Disease Progress Note    Author: Delores Fields M.D. Date & Time of service: 3/16/2023  1:53 PM    Chief Complaint:   infected ankle after ORIF     Interval History:  47 y.o. male admitted 3/12/2023 for drainage and swelling left ankle   3/14 AF WBC 6.8 creat 1.01 plan for OR today culture now also enteric elisa BP remain poorly controlled  3/15 AF WBC 10.3 sleeping at time of rounds cultures neg Op note reviewed-debrided through muscle. No gaby pus or necrosis  3/16 AF plan of care reviewed after rounds-cultures are now polymicrobial  Labs Reviewed and Medications Reviewed.    Review of Systems:  Review of Systems   Constitutional:  Negative for fever.   Respiratory:  Negative for shortness of breath.    Musculoskeletal:  Positive for joint pain.   All other systems reviewed and are negative.    Hemodynamics:  Temp (24hrs), Av.9 °C (98.4 °F), Min:36.7 °C (98 °F), Max:37.4 °C (99.3 °F)  Temperature: 36.7 °C (98 °F)  Pulse  Av  Min: 75  Max: 110   Blood Pressure: (!) 159/107       Physical Exam:  Physical Exam  Vitals and nursing note reviewed.   Constitutional:       General: He is not in acute distress.     Appearance: He is not ill-appearing, toxic-appearing or diaphoretic.   Eyes:      General: No scleral icterus.  Cardiovascular:      Rate and Rhythm: Normal rate.   Pulmonary:      Effort: No respiratory distress.   Abdominal:      General: There is no distension.      Tenderness: There is no abdominal tenderness.   Musculoskeletal:      Comments: Left ankle dressed-decreased ROM   Skin:     Coloration: Skin is not jaundiced.   Neurological:      General: No focal deficit present.      Mental Status: He is oriented to person, place, and time.   Psychiatric:         Behavior: Behavior normal.       Meds:    Current Facility-Administered Medications:     cloNIDine    hydrALAZINE    DAPTOmycin    cefTRIAXone (ROCEPHIN) IV    lisinopril    carvedilol    isosorbide mononitrate SR     LORazepam    hydrALAZINE    lidocaine    amLODIPine    furosemide    labetalol    atorvastatin    spironolactone    senna-docusate **AND** polyethylene glycol/lytes **AND** magnesium hydroxide **AND** bisacodyl    acetaminophen    ondansetron    ondansetron    promethazine    promethazine    prochlorperazine    nicotine    oxyCODONE immediate-release    Labs:  Recent Labs     03/14/23  0119 03/15/23  0008 03/16/23  0131   WBC 6.8 10.3 7.7   RBC 5.26 5.27 5.33   HEMOGLOBIN 14.2 14.8 14.7   HEMATOCRIT 43.0 42.9 44.7   MCV 81.7 81.4 83.9   MCH 27.0 28.1 27.6   RDW 41.5 41.7 43.3   PLATELETCT 198 199 210   MPV 9.5 10.2 9.6       Recent Labs     03/14/23  0119 03/15/23  0112 03/16/23  0131   SODIUM 141 137 142   POTASSIUM 3.6 4.1 3.8   CHLORIDE 104 102 105   CO2 26 23 27   GLUCOSE 117* 117* 108*   BUN 17 25* 17       Recent Labs     03/14/23  0119 03/15/23  0112 03/16/23  0131   ALBUMIN  --  3.9 3.8   CREATININE 1.01 1.28 1.14         Imaging:  DX-ANKLE 3+ VIEWS LEFT    Result Date: 3/12/2023  3/12/2023 7:18 AM HISTORY/REASON FOR EXAM: Atraumatic Pain/Swelling/Deformity; ORIF 5 weeks ago; now with wound infection TECHNIQUE/EXAM DESCRIPTION:  AP, lateral, and oblique views of the 3 ankle. COMPARISON:  February 4, 2023 FINDINGS: Interval postsurgical changes of fibular ORIF are seen. There is small posterior malleolus fracture visualized. Bony fragments adjacent to the medial malleolus are seen. Soft tissue swelling of the ankle is noted.     1.  Postoperative changes of distal fibular ORIF. 2.  Small bony fragments adjacent to the medial malleolus and small posterior malleolus fracture fragment are seen likely from prior injury.     DX-ANKLE 3+ VIEWS LEFT    Result Date: 2/21/2023  Plain film x-ray taken today independently reviewed by myself include Ap oblique and lateral views left ankle demonstrating status post ORIF left lateral malleolus ankle fracture and left syndesmosis open repair and fixation.  Hardware is in  good position and intact. Anatomic alignment..       Micro:  Results       Procedure Component Value Units Date/Time    Anaerobic Culture [[id number]] Collected: 03/14/23 1040    Order Status: Completed Specimen: Tissue Updated: 03/16/23 1234     Significant Indicator NEG     Source TISS     Site Left Ankle     Culture Result Culture in progress.    Narrative:      Surgery Specimen    Fungal Culture [[id number]] Collected: 03/14/23 1040    Order Status: Completed Specimen: Tissue Updated: 03/16/23 1234     Significant Indicator NEG     Source TISS     Site Left Ankle     Culture Result Culture in progress.     Fungal Smear Results No fungal elements seen.    Narrative:      Surgery Specimen    AFB Culture [[id number]] Collected: 03/14/23 1040    Order Status: Completed Specimen: Tissue Updated: 03/16/23 1234     Significant Indicator NEG     Source TISS     Site Left Ankle     Culture Result Culture in progress.     AFB Smear Results No acid fast bacilli seen.    Narrative:      Surgery Specimen    CULTURE TISSUE W/ GRM STAIN [[id number]]  (Abnormal) Collected: 03/14/23 1040    Order Status: Completed Specimen: Tissue Updated: 03/16/23 1234     Significant Indicator POS     Source TISS     Site Left Ankle     Culture Result -     Gram Stain Result No organisms seen.     Culture Result Proteus mirabilis  Rare growth      Narrative:      Surgery Specimen    CULTURE WOUND W/ GRAM STAIN [[id number]]  (Abnormal) Collected: 03/14/23 1040    Order Status: Completed Specimen: Wound Updated: 03/16/23 1038     Significant Indicator POS     Source WND     Site Left Ankle     Culture Result -     Gram Stain Result Few WBCs.  No organisms seen.       Culture Result Klebsiella oxytoca  Rare growth        Enterococcus faecalis  Rare growth      Narrative:      Surgery - swabs received    Anaerobic Culture [[id number]] Collected: 03/14/23 1040    Order Status: Completed Specimen: Wound Updated: 03/16/23 1038     Significant Indicator NEG      Source WND     Site Left Ankle     Culture Result Culture in progress.    Narrative:      Surgery - swabs received    GRAM STAIN [[id number]] Collected: 03/14/23 1040    Order Status: Completed Specimen: Tissue Updated: 03/15/23 2013     Significant Indicator .     Source TISS     Site Left Ankle     Gram Stain Result No organisms seen.    Narrative:      Surgery Specimen    Acid Fast Stain [[id number]] Collected: 03/14/23 1040    Order Status: Completed Specimen: Tissue Updated: 03/15/23 2013     Significant Indicator NEG     Source TISS     Site Left Ankle     AFB Smear Results No acid fast bacilli seen.    Narrative:      Surgery Specimen    Fungal Smear [[id number]] Collected: 03/14/23 1040    Order Status: Completed Specimen: Tissue Updated: 03/15/23 2013     Significant Indicator NEG     Source TISS     Site Left Ankle     Fungal Smear Results No fungal elements seen.    Narrative:      Surgery Specimen    GRAM STAIN [[id number]] Collected: 03/14/23 1040    Order Status: Completed Specimen: Wound Updated: 03/14/23 1747     Significant Indicator .     Source WND     Site Left Ankle     Gram Stain Result Few WBCs.  No organisms seen.      Narrative:      Surgery - swabs received    CULTURE WOUND W/ GRAM STAIN [[id number]] Collected: 03/12/23 0730    Order Status: Completed Specimen: Wound from Left Ankle Updated: 03/14/23 1725     Significant Indicator NEG     Source WND     Site LEFT ANKLE     Culture Result Light growth mixed enteric elisa.     Gram Stain Result No WBCs seen.  Few Gram positive cocci.      MRSA By PCR (Amp) [[id number]]     Order Status: No result Specimen: Respirate from Nares     BLOOD CULTURE x2 [[id number]] Collected: 03/12/23 0654    Order Status: Completed Specimen: Blood from Peripheral Updated: 03/13/23 0616     Significant Indicator NEG     Source BLD     Site PERIPHERAL     Culture Result No Growth  Note: Blood cultures are incubated for 5 days and  are monitored continuously.Positive  "blood cultures  are called to the RN and reported as soon as  they are identified.      Narrative:      Per Hospital Policy: Only change Specimen Src: to \"Line\" if  specified by physician order.  No site indicated    BLOOD CULTURE x2 [[id number]] Collected: 03/12/23 0736    Order Status: Completed Specimen: Blood from Peripheral Updated: 03/13/23 0616     Significant Indicator NEG     Source BLD     Site PERIPHERAL     Culture Result No Growth  Note: Blood cultures are incubated for 5 days and  are monitored continuously.Positive blood cultures  are called to the RN and reported as soon as  they are identified.      Narrative:      Per Hospital Policy: Only change Specimen Src: to \"Line\" if  specified by physician order.  Left AC    GRAM STAIN [[id number]] Collected: 03/12/23 0730    Order Status: Completed Specimen: Wound Updated: 03/12/23 1803     Significant Indicator .     Source WND     Site LEFT ANKLE     Gram Stain Result No WBCs seen.  Few Gram positive cocci.              Assessment:  Active Hospital Problems    Diagnosis     *Postoperative wound infection [T81.49XA]     Pseudogout [M11.20]     Dependence on nicotine from cigarettes [F17.210]     Chronic systolic CHF (congestive heart failure) (HCC) [I50.22]     Hypertensive urgency [I16.0]      Infected Left ORIF site with hardware in place  CHF  Noncompliance BP meds/uncontrolled HTN   Polymicrobial wound infection Efaecalis, Klebsiella, Proteus     PLAN:   FU culture sensitivities  Start daptomycin and cefepime  Monitor CPK and BMP  Management BP per PCT-renal USG done  Final antibiotic recommendations per culture results and clinical course    Prognosis for limb salvage guarded  [facility] Ortho  "

## 2023-03-17 ENCOUNTER — APPOINTMENT (OUTPATIENT)
Dept: RADIOLOGY | Facility: MEDICAL CENTER | Age: 48
DRG: 857 | End: 2023-03-17
Attending: HOSPITALIST
Payer: COMMERCIAL

## 2023-03-17 LAB
ALBUMIN SERPL BCP-MCNC: 3.6 G/DL (ref 3.2–4.9)
BACTERIA BLD CULT: NORMAL
BACTERIA BLD CULT: NORMAL
BACTERIA WND AEROBE CULT: ABNORMAL
BUN SERPL-MCNC: 22 MG/DL (ref 8–22)
CALCIUM ALBUM COR SERPL-MCNC: 9.4 MG/DL (ref 8.5–10.5)
CALCIUM SERPL-MCNC: 9.1 MG/DL (ref 8.5–10.5)
CHLORIDE SERPL-SCNC: 103 MMOL/L (ref 96–112)
CO2 SERPL-SCNC: 26 MMOL/L (ref 20–33)
CREAT SERPL-MCNC: 1.12 MG/DL (ref 0.5–1.4)
ERYTHROCYTE [DISTWIDTH] IN BLOOD BY AUTOMATED COUNT: 42.6 FL (ref 35.9–50)
GFR SERPLBLD CREATININE-BSD FMLA CKD-EPI: 81 ML/MIN/1.73 M 2
GLUCOSE SERPL-MCNC: 155 MG/DL (ref 65–99)
GRAM STN SPEC: ABNORMAL
HCT VFR BLD AUTO: 45.9 % (ref 42–52)
HGB BLD-MCNC: 15.1 G/DL (ref 14–18)
MCH RBC QN AUTO: 27.4 PG (ref 27–33)
MCHC RBC AUTO-ENTMCNC: 32.9 G/DL (ref 33.7–35.3)
MCV RBC AUTO: 83.3 FL (ref 81.4–97.8)
PHOSPHATE SERPL-MCNC: 3.6 MG/DL (ref 2.5–4.5)
PLATELET # BLD AUTO: 226 K/UL (ref 164–446)
PMV BLD AUTO: 9.7 FL (ref 9–12.9)
POTASSIUM SERPL-SCNC: 3.8 MMOL/L (ref 3.6–5.5)
RBC # BLD AUTO: 5.51 M/UL (ref 4.7–6.1)
SIGNIFICANT IND 70042: ABNORMAL
SIGNIFICANT IND 70042: NORMAL
SIGNIFICANT IND 70042: NORMAL
SITE SITE: ABNORMAL
SITE SITE: NORMAL
SITE SITE: NORMAL
SODIUM SERPL-SCNC: 141 MMOL/L (ref 135–145)
SOURCE SOURCE: ABNORMAL
SOURCE SOURCE: NORMAL
SOURCE SOURCE: NORMAL
WBC # BLD AUTO: 7.3 K/UL (ref 4.8–10.8)

## 2023-03-17 PROCEDURE — 700117 HCHG RX CONTRAST REV CODE 255: Performed by: HOSPITALIST

## 2023-03-17 PROCEDURE — 700102 HCHG RX REV CODE 250 W/ 637 OVERRIDE(OP): Performed by: HOSPITALIST

## 2023-03-17 PROCEDURE — 700111 HCHG RX REV CODE 636 W/ 250 OVERRIDE (IP): Performed by: INTERNAL MEDICINE

## 2023-03-17 PROCEDURE — 700102 HCHG RX REV CODE 250 W/ 637 OVERRIDE(OP): Performed by: NURSE PRACTITIONER

## 2023-03-17 PROCEDURE — 700105 HCHG RX REV CODE 258: Performed by: INTERNAL MEDICINE

## 2023-03-17 PROCEDURE — A9270 NON-COVERED ITEM OR SERVICE: HCPCS | Performed by: HOSPITALIST

## 2023-03-17 PROCEDURE — A9270 NON-COVERED ITEM OR SERVICE: HCPCS | Performed by: NURSE PRACTITIONER

## 2023-03-17 PROCEDURE — A9270 NON-COVERED ITEM OR SERVICE: HCPCS | Performed by: STUDENT IN AN ORGANIZED HEALTH CARE EDUCATION/TRAINING PROGRAM

## 2023-03-17 PROCEDURE — 80069 RENAL FUNCTION PANEL: CPT

## 2023-03-17 PROCEDURE — 85027 COMPLETE CBC AUTOMATED: CPT

## 2023-03-17 PROCEDURE — 770001 HCHG ROOM/CARE - MED/SURG/GYN PRIV*

## 2023-03-17 PROCEDURE — 74174 CTA ABD&PLVS W/CONTRAST: CPT

## 2023-03-17 PROCEDURE — 02HV33Z INSERTION OF INFUSION DEVICE INTO SUPERIOR VENA CAVA, PERCUTANEOUS APPROACH: ICD-10-PCS | Performed by: HOSPITALIST

## 2023-03-17 PROCEDURE — 700111 HCHG RX REV CODE 636 W/ 250 OVERRIDE (IP): Performed by: HOSPITALIST

## 2023-03-17 PROCEDURE — 99233 SBSQ HOSP IP/OBS HIGH 50: CPT | Performed by: INTERNAL MEDICINE

## 2023-03-17 PROCEDURE — 700102 HCHG RX REV CODE 250 W/ 637 OVERRIDE(OP): Performed by: STUDENT IN AN ORGANIZED HEALTH CARE EDUCATION/TRAINING PROGRAM

## 2023-03-17 PROCEDURE — 36415 COLL VENOUS BLD VENIPUNCTURE: CPT

## 2023-03-17 PROCEDURE — 36573 INSJ PICC RS&I 5 YR+: CPT

## 2023-03-17 PROCEDURE — 97161 PT EVAL LOW COMPLEX 20 MIN: CPT

## 2023-03-17 PROCEDURE — 99233 SBSQ HOSP IP/OBS HIGH 50: CPT | Performed by: HOSPITALIST

## 2023-03-17 RX ORDER — SPIRONOLACTONE 25 MG/1
25 TABLET ORAL ONCE
Status: COMPLETED | OUTPATIENT
Start: 2023-03-17 | End: 2023-03-17

## 2023-03-17 RX ORDER — ACETAMINOPHEN 500 MG
1000 TABLET ORAL EVERY 8 HOURS PRN
Status: DISCONTINUED | OUTPATIENT
Start: 2023-03-17 | End: 2023-03-18 | Stop reason: HOSPADM

## 2023-03-17 RX ORDER — SPIRONOLACTONE 25 MG/1
50 TABLET ORAL DAILY
Status: DISCONTINUED | OUTPATIENT
Start: 2023-03-18 | End: 2023-03-18 | Stop reason: HOSPADM

## 2023-03-17 RX ORDER — LINEZOLID 600 MG/1
600 TABLET, FILM COATED ORAL EVERY 12 HOURS
Status: DISCONTINUED | OUTPATIENT
Start: 2023-03-18 | End: 2023-03-18 | Stop reason: HOSPADM

## 2023-03-17 RX ORDER — LOSARTAN POTASSIUM 50 MG/1
100 TABLET ORAL EVERY EVENING
Status: DISCONTINUED | OUTPATIENT
Start: 2023-03-17 | End: 2023-03-18 | Stop reason: HOSPADM

## 2023-03-17 RX ORDER — FUROSEMIDE 10 MG/ML
40 INJECTION INTRAMUSCULAR; INTRAVENOUS ONCE
Status: COMPLETED | OUTPATIENT
Start: 2023-03-17 | End: 2023-03-17

## 2023-03-17 RX ORDER — LOSARTAN POTASSIUM 50 MG/1
50 TABLET ORAL EVERY EVENING
Status: DISCONTINUED | OUTPATIENT
Start: 2023-03-17 | End: 2023-03-17

## 2023-03-17 RX ADMIN — LOSARTAN POTASSIUM 100 MG: 50 TABLET, FILM COATED ORAL at 17:54

## 2023-03-17 RX ADMIN — DAPTOMYCIN 1330 MG: 500 INJECTION, POWDER, LYOPHILIZED, FOR SOLUTION INTRAVENOUS at 06:22

## 2023-03-17 RX ADMIN — SPIRONOLACTONE 25 MG: 25 TABLET ORAL at 04:58

## 2023-03-17 RX ADMIN — CARVEDILOL 25 MG: 6.25 TABLET, FILM COATED ORAL at 10:07

## 2023-03-17 RX ADMIN — CEFEPIME 2 G: 2 INJECTION, POWDER, FOR SOLUTION INTRAVENOUS at 05:04

## 2023-03-17 RX ADMIN — ATORVASTATIN CALCIUM 10 MG: 10 TABLET, FILM COATED ORAL at 20:23

## 2023-03-17 RX ADMIN — ACETAMINOPHEN 1000 MG: 500 TABLET, FILM COATED ORAL at 19:15

## 2023-03-17 RX ADMIN — IOHEXOL 100 ML: 350 INJECTION, SOLUTION INTRAVENOUS at 10:58

## 2023-03-17 RX ADMIN — CARVEDILOL 25 MG: 6.25 TABLET, FILM COATED ORAL at 17:55

## 2023-03-17 RX ADMIN — SPIRONOLACTONE 25 MG: 25 TABLET ORAL at 15:09

## 2023-03-17 RX ADMIN — ACETAMINOPHEN 1000 MG: 500 TABLET, FILM COATED ORAL at 07:16

## 2023-03-17 RX ADMIN — FUROSEMIDE 40 MG: 10 INJECTION, SOLUTION INTRAMUSCULAR; INTRAVENOUS at 12:15

## 2023-03-17 RX ADMIN — AMLODIPINE BESYLATE 10 MG: 10 TABLET ORAL at 04:58

## 2023-03-17 RX ADMIN — FUROSEMIDE 20 MG: 20 TABLET ORAL at 10:07

## 2023-03-17 RX ADMIN — Medication 1 APPLICATOR: at 17:55

## 2023-03-17 RX ADMIN — HYDRALAZINE HYDROCHLORIDE 100 MG: 50 TABLET, FILM COATED ORAL at 22:59

## 2023-03-17 RX ADMIN — CEFEPIME 2 G: 2 INJECTION, POWDER, FOR SOLUTION INTRAVENOUS at 18:04

## 2023-03-17 RX ADMIN — CLONIDINE HYDROCHLORIDE 0.1 MG: 0.1 TABLET ORAL at 23:07

## 2023-03-17 RX ADMIN — ISOSORBIDE MONONITRATE 60 MG: 60 TABLET, EXTENDED RELEASE ORAL at 05:00

## 2023-03-17 RX ADMIN — LABETALOL HYDROCHLORIDE 10 MG: 5 INJECTION INTRAVENOUS at 19:17

## 2023-03-17 RX ADMIN — HYDRALAZINE HYDROCHLORIDE 100 MG: 50 TABLET, FILM COATED ORAL at 04:58

## 2023-03-17 RX ADMIN — FUROSEMIDE 20 MG: 20 TABLET ORAL at 22:59

## 2023-03-17 ASSESSMENT — PAIN DESCRIPTION - PAIN TYPE
TYPE: ACUTE PAIN
TYPE: ACUTE PAIN;SURGICAL PAIN
TYPE: SURGICAL PAIN

## 2023-03-17 ASSESSMENT — ENCOUNTER SYMPTOMS
NERVOUS/ANXIOUS: 1
DOUBLE VISION: 0
VOMITING: 0
SHORTNESS OF BREATH: 0
HEARTBURN: 0
FEVER: 0
COUGH: 0
SORE THROAT: 0
MYALGIAS: 1
HEMOPTYSIS: 0
ABDOMINAL PAIN: 0
BLURRED VISION: 0
NAUSEA: 0

## 2023-03-17 ASSESSMENT — GAIT ASSESSMENTS
ASSISTIVE DEVICE: FRONT WHEEL WALKER
GAIT LEVEL OF ASSIST: MODIFIED INDEPENDENT
DISTANCE (FEET): 100
DEVIATION: ANTALGIC;STEP TO

## 2023-03-17 ASSESSMENT — COGNITIVE AND FUNCTIONAL STATUS - GENERAL
CLIMB 3 TO 5 STEPS WITH RAILING: A LITTLE
MOBILITY SCORE: 23
SUGGESTED CMS G CODE MODIFIER MOBILITY: CI

## 2023-03-17 NOTE — PROCEDURES
Vascular Access Team     Date of Insertion: 3/17/23  Arm Circumference: 44  Internal length: 50  External Length: 2  Vein Occupancy %: 28   Reason for PICC: Long term Iv antibiotics  Labs: WBC 7.3, , BUN 22, Cr 1.12, GFR 81, INR none     Consents confirmed, vessel patency confirmed with ultrasound. Risks and benefits of procedure explained to patient and education regarding central line associated bloodstream infections provided. Questions answered.      PICC placed in RUE per licensed provider order with ultrasound guidance.  4 Fr, 01 lumen PICC placed in basilic vein after 01 attempt(s). 3 mL of 1% lidocaine injected intradermally at the insertion site. A 21 gauge microintroducer needle was visualized entering the vein and modified Seldinger technique was used to obtain access to the vein. 50 cm catheter inserted and brisk blood return was observed from each lumen upon aspiration. Line secured at the 2 cm marker. TCS stylet removed and observed to be fully intact. Each lumen flushed using pulsatile method without resistance with 10 mL 0.9% normal saline. PICC line secured with Biopatch and Tegaderm.     PICC tip placement location is confirmed by nurse to be in the Superior Vena Cava (SVC) utilizing 3CG technology. PICC line is appropriate for use at this time. Patient tolerated procedure well, without complications.  Patient condition relayed to primary RN or ordering physician via this post procedure note in the EMR.      Ultrasound images uploaded to PACS and viewable in the EMR - yes  Ultrasound imaged printed and placed in paper chart - no     BARD Power PICC ref # [id number], Lot # [id number], Expiration Date 11/30/23

## 2023-03-17 NOTE — PROGRESS NOTES
"      Orthopaedic Progress Note    Interval changes:  Patient doing well    Cultures positive for klebsiella and enterococcus- ID team awaiting final sensitivities   Cleared for DC by ortho pending ID and medicine team clearance    ROS - Patient denies any new issues.  Pain well controlled.    BP (!) 187/112   Pulse 83   Temp 37.1 °C (98.8 °F) (Temporal)   Resp 17   Ht 2.083 m (6' 10\")   Wt (!) 166 kg (365 lb 15.4 oz)   SpO2 93%       Patient seen and examined  No acute distress  Breathing non labored  RRR  LLE dressing CDI, incision without issue, DNVI, moves all toes, cap refill <2 sec.      Recent Labs     03/15/23  0008 03/16/23  0131 03/17/23  0202   WBC 10.3 7.7 7.3   RBC 5.27 5.33 5.51   HEMOGLOBIN 14.8 14.7 15.1   HEMATOCRIT 42.9 44.7 45.9   MCV 81.4 83.9 83.3   MCH 28.1 27.6 27.4   MCHC 34.5 32.9* 32.9*   RDW 41.7 43.3 42.6   PLATELETCT 199 210 226   MPV 10.2 9.6 9.7       Active Hospital Problems    Diagnosis     Postoperative wound infection [T81.49XA]     Pseudogout [M11.20]     Dependence on nicotine from cigarettes [F17.210]     Chronic systolic CHF (congestive heart failure) (HCC) [I50.22]      Chronic systolic CHF, etiology unknown, first noted 07/2022 presented at ED with SOB, PND and noted to have hypertensive emergency. Initial echocardiogram showed LVEF 30%.     Last Echo 02/2023 :   \"Contrast offered but pt refused.  Technically difficult due to body habtus, positioning and pt left leg   in boot from previous surgery but adequate study for interpretation.   Moderately reduced left ventricular systolic function.  The left ventricular ejection fraction is visually estimated to be 40%.  Moderate concentric left ventricular hypertrophy.  Enlarged left ventricular chamber size.  Grade I diastolic dysfunction.  Normal right ventricular size and systolic function.  No significant valvular stenosis or regurgitation seen.  Unable to estimate right sided intracardiac pressures.\"      Hypertensive " urgency [I16.0]      Hx of hypertensive urgency and hx HFrEF 07/2020 ( 30%, improved 2023)  - current discharged on   amlodipine 10mg,   carvedilol 12.5mg BID,   lisinopril 20mg BID,   imdur 30mg daily,   spironolactone 25mg daily   Furosemide 20mg BID    Previously taking ibuprofen 800mg for headache     02/13/2023  - discussed sleep study- patient would like to defer until financial  - discussed secondary workup for HTN however patient would like to defer at this time         Assessment/Plan:  Patient doing well    Cultures positive for klebsiella and enterococcus- ID team awaiting final sensitivities   Cleared for DC by ortho pending ID and medicine team clearance  POD#3 S/P Irrigation and debridement and secondary closure of 8 cm wound dehiscence                                                  Wt bearing status - WBAT operative side  Wound care/Drains - Dressings to be changed every other day by nursing  Future Procedures - none planned   Lovenox: Start ok per ortho, Duration-until ambulatory > 150'  Sutures/Staples out- 14 days post operatively  PT/OT-initiated  Antibiotics: maxipime 2g IV q12, dapto 1330 mg IV QD  DVT Prophylaxis- TEDS/SCDs/Foot pumps  Ocrdero-none  Case Coordination for Discharge Planning - Disposition per abx needs

## 2023-03-17 NOTE — CARE PLAN
The patient is Stable - Low risk of patient condition declining or worsening    Shift Goals  Clinical Goals: BP monitoring  Patient Goals: figure out my BP  Family Goals: not present    Progress made toward(s) clinical / shift goals:    Problem: Knowledge Deficit - Standard  Goal: Patient and family/care givers will demonstrate understanding of plan of care, disease process/condition, diagnostic tests and medications  Outcome: Progressing     Problem: Pain - Standard  Goal: Alleviation of pain or a reduction in pain to the patient’s comfort goal  Outcome: Progressing     Problem: Fall Risk  Goal: Patient will remain free from falls  Outcome: Progressing       Patient is not progressing towards the following goals:

## 2023-03-17 NOTE — CARE PLAN
The patient is Stable - Low risk of patient condition declining or worsening    Shift Goals  Clinical Goals: BP monitoring, Mobility  Patient Goals: Rest  Family Goals: Not present    Progress made toward(s) clinical / shift goals:    Problem: Knowledge Deficit - Standard  Goal: Patient and family/care givers will demonstrate understanding of plan of care, disease process/condition, diagnostic tests and medications  Outcome: Progressing  Note: Plan of care discussed, verbalized understanding. Questions answered        Problem: Pain - Standard  Goal: Alleviation of pain or a reduction in pain to the patient’s comfort goal  Outcome: Progressing  Note: Patient educated on pain scale and to notify RN of pain. Medicated per MAR and repositioned        Problem: Fall Risk  Goal: Patient will remain free from falls  Outcome: Progressing       Patient is not progressing towards the following goals:

## 2023-03-17 NOTE — PROGRESS NOTES
[facility] Medicine Daily Progress Note    Date of Service  3/17/2023    Chief Complaint  Gage Garcia is a 47 y.o. male admitted 3/12/2023 with left ankle post-operative infection.    Hospital Course  This is a 47-year-old male with a past medical history significant for hypertension, pseudogout, cardiomyopathy with a EF of 40% was initially admitted from 2/4 to 2/9, he had surgery with Dr. Burleson on 2/4, orthopedic recommended toe-touch weightbearing on the operative extremity.    He presented to the ER on 3/12 with a left ankle infection.  He had a stated out after 16 days; noted to have purulent drainage from the lateral side of the left ankle.  Blood cultures x2 no growth to date, patient underwent I&D; secondary closure of 8 cm wound dehiscence on 3/14.  Per Ortho patient to be full weightbearing on the operative extremity.  Intraoperative culture pending.    Currently patient is on broad-spectrum antibiotics including vancomycin and Zosyn    Interval events:  -- Patient is noted to be agitated and frustrated early in the morning because he was receiving blood pressure medication.  Later he calmed down.  Currently he is on Coreg 12.5 mg p.o. twice daily, Lasix 20 mg p.o. twice daily, Imdur 30 mg p.o. daily, lisinopril 20 mg p.o. daily, Aldactone 25 mg p.o. daily, amlodipine 10 mg p.o. daily.  His blood pressure has been improving  --Patient underwent surgery today, intraoperative culture pending, will start broad-spectrum antibiotics, ID following, orthopedic surgery following.  --Per orthopedic surgery, patient is weightbearing as tolerated    3/15:  -- No acute events overnight, patient has been stable, but patient blood pressure is still elevated  -Increase the dose of Imdur lisinopril and Coreg  --  patient underwent Surgery, yesterday  intraoperative culture pending, ID following, continue IV antibiotics as per infectious disease recommendation.  Will need pt and ot    3/16:--No acute events overnight,  vital sign has been stable other than elevated blood pressure.  General arterial duplex ultrasound ordered, pending.    Patient is currently on amlodipine 10 mg p.o. daily, hydralazine 100 3 times daily, Coreg 25 mg p.o. twice daily, Aldactone 25 mg p.o. daily, Lasix 20 mg p.o. twice daily, Imdur 60 mg p.o. daily as needed clonidine  --Lisinopril was held  unless the result of renal arterial  duplex is out  -- ID following and will continue iv abx as per their recs    3/17:  -- No acute events overnight, medicine has been stable, patient blood pressure is still elevated.  Adjusting antihypertensive medication.  CT of the abdomen showed patent renal arteries  -- Blood pressure continue to remain high, will call nephrology again tomorrow.  ID following, continue antibiotics as per infectious disease recommendation  -- ortho following , will follow their recs    Currently on losartan 100 mg p.o. daily, amlodipine 10 mg p.o. daily,  Coreg 25 mg p.o. twice daily, hydralazine 100, imdur60 mg  qd, aldactone 50 mg po qd      I have discussed this patient's plan of care and discharge plan at IDT rounds today with Case Management, Nursing, Nursing leadership, and other members of the IDT team.    Consultants/Specialty  infectious disease and orthopedics    Code Status  Full Code    Disposition  Patient is not medically cleared for discharge.   Anticipate discharge to to home with organized home healthcare and close outpatient follow-up.  I have placed the appropriate orders for post-discharge needs.    Review of Systems  All 14 systems were reviewed and negative except as mentioned above    Review of Systems   Constitutional:  Positive for malaise/fatigue. Negative for fever.   HENT:  Negative for congestion and sore throat.    Eyes:  Negative for blurred vision and double vision.   Respiratory:  Negative for cough and hemoptysis.    Cardiovascular:  Negative for chest pain.   Gastrointestinal:  Negative for abdominal pain,  heartburn, nausea and vomiting.   Musculoskeletal:  Positive for joint pain and myalgias.   Psychiatric/Behavioral:  The patient is nervous/anxious.    All other systems reviewed and are negative.      Physical Exam  Temp:  [37.1 °C (98.8 °F)] 37.1 °C (98.8 °F)  Pulse:  [83-85] 83  Resp:  [17] 17  BP: (152-187)/() 156/100  SpO2:  [93 %-94 %] 93 %    Physical Exam  Vitals and nursing note reviewed.   Constitutional:       Appearance: He is obese. He is ill-appearing.   HENT:      Head: Normocephalic and atraumatic.   Eyes:      Extraocular Movements: Extraocular movements intact.      Pupils: Pupils are equal, round, and reactive to light.   Cardiovascular:      Rate and Rhythm: Normal rate and regular rhythm.   Pulmonary:      Effort: Pulmonary effort is normal.      Breath sounds: Normal breath sounds.   Abdominal:      General: Bowel sounds are normal. There is no distension.      Palpations: Abdomen is soft.      Tenderness: There is no abdominal tenderness.   Musculoskeletal:         General: Swelling and tenderness present.      Cervical back: Neck supple.      Comments: Left ankle open wound, swelling, erythema   Neurological:      Mental Status: He is alert and oriented to person, place, and time. Mental status is at baseline.           Fluids    Intake/Output Summary (Last 24 hours) at 3/17/2023 1542  Last data filed at 3/17/2023 1506  Gross per 24 hour   Intake 220.02 ml   Output 3800 ml   Net -3579.98 ml         Laboratory  Recent Labs     03/15/23  0008 03/16/23  0131 03/17/23  0202   WBC 10.3 7.7 7.3   RBC 5.27 5.33 5.51   HEMOGLOBIN 14.8 14.7 15.1   HEMATOCRIT 42.9 44.7 45.9   MCV 81.4 83.9 83.3   MCH 28.1 27.6 27.4   MCHC 34.5 32.9* 32.9*   RDW 41.7 43.3 42.6   PLATELETCT 199 210 226   MPV 10.2 9.6 9.7       Recent Labs     03/15/23  0112 03/16/23  0131 03/17/23  0202   SODIUM 137 142 141   POTASSIUM 4.1 3.8 3.8   CHLORIDE 102 105 103   CO2 23 27 26   GLUCOSE 117* 108* 155*   BUN 25* 17 22    CREATININE 1.28 1.14 1.12   CALCIUM 8.9 8.9 9.1                     Imaging  IR-PICC LINE PLACEMENT W/ GUIDANCE > AGE 5   Final Result                  Ultrasound-guided PICC placement performed by qualified nursing staff as    above.          CTA ABDOMEN PELVIS W & W/O POST PROCESS   Final Result      1.  Patent, normal caliber renal arteries bilaterally.   2.  Enlarged fatty liver.   3.  Minimal episodic calcification of abdominal aorta.  No aneurysm or dissection.      IR-US GUIDED PIV   Final Result    Ultrasound-guided PERIPHERAL IV INSERTION performed by    qualified nursing staff as above.      US-RENAL ARTERY DUPLEX COMP   Final Result      DX-ANKLE 3+ VIEWS LEFT   Final Result         1.  Postoperative changes of distal fibular ORIF.   2.  Small bony fragments adjacent to the medial malleolus and small posterior malleolus fracture fragment are seen likely from prior injury.                Assessment/Plan  * Postoperative wound infection- (present on admission)  Assessment & Plan  Per patient he has had blood and purulent drainage from the lateral ankle surgical site.   WBC normal with elevated CRP  IV antibiotics and pain control  Dr. Chance brooke was called from the ER      0n 3/14: Patient underwent surgery, will await culture, ID following, continue IV antibiotics as per infectious disease recommendation  Intra-op cx  Growing proteus and enterococcus fecalis    -- on dapto+ cefepiem    Pseudogout- (present on admission)  Assessment & Plan  Hx of    Dependence on nicotine from cigarettes- (present on admission)  Assessment & Plan  Cessation again discussed  Adverse effect of smoking including CAD, COPD, CVA, lung cancer Has been explained to the patient; he stated understanding  Provide nicotine patch  More than 3 min were spent in counselling     Chronic systolic CHF (congestive heart failure) (HCC)- (present on admission)  Assessment & Plan  Last echo Feb had an EF 40%  Without  exacerbation  Continue lisinopril 20 mg p.o. daily, Imdur 60 mg.,  Coreg 25 mg p.o. twice daily, Lasix 20 mg p.o. twice daily, Aldactone 25 mg p.o. daily  I/os, cardiac diet    Hypertensive urgency- (present on admission)  Assessment & Plan  Blood pressure improving, continue  1.Losartan 100 mg po   2. Imdur 60 mg p.o. daily  3. Coreg 25 mg p.o. twice john  4., amlodipine 10 mg p.o. daily  5.  Hydralazine 100 tid  6. amlodipine 10 mg p.o. daily  7. Aldactone 25 mg p.o. daily  Clinidine for SBP > 160    Along with as needed antihypertensive medication  CTA renal artert normal          VTE prophylaxis: Lovenox    I have performed a physical exam and reviewed and updated ROS and Plan today (3/17/2023). In review of yesterday's note (3/16/2023), there are no changes except as documented above.

## 2023-03-17 NOTE — PROGRESS NOTES
Infectious Disease Progress Note    Author: Delores Fields M.D. Date & Time of service: 3/17/2023  1:55 PM    Chief Complaint:   infected ankle after ORIF     Interval History:  47 y.o. male admitted 3/12/2023 for drainage and swelling left ankle   3/14 AF WBC 6.8 creat 1.01 plan for OR today culture now also enteric elisa BP remain poorly controlled  3/15 AF WBC 10.3 sleeping at time of rounds cultures neg Op note reviewed-debrided through muscle. No gaby pus or necrosis  3/16 AF plan of care reviewed after rounds-cultures are now polymicrobial  3/17 AF WBC 7.3 continued problems with blood pressure Culture results and abx discussed  Labs Reviewed and Medications Reviewed.    Review of Systems:  Review of Systems   Constitutional:  Negative for fever.   Respiratory:  Negative for shortness of breath.    Musculoskeletal:  Positive for joint pain.   All other systems reviewed and are negative.    Hemodynamics:  Temp (24hrs), Av.1 °C (98.8 °F), Min:37.1 °C (98.8 °F), Max:37.1 °C (98.8 °F)  Temperature: 37.1 °C (98.8 °F), Monitored Temp: 36.8 °C (98.2 °F)  Pulse  Av.6  Min: 75  Max: 110   Blood Pressure: (!) 152/98       Physical Exam:  Physical Exam  Vitals and nursing note reviewed.   Constitutional:       General: He is not in acute distress.     Appearance: He is not ill-appearing, toxic-appearing or diaphoretic.   Eyes:      General: No scleral icterus.  Cardiovascular:      Rate and Rhythm: Normal rate.   Pulmonary:      Effort: No respiratory distress.   Abdominal:      General: There is no distension.      Tenderness: There is no abdominal tenderness.   Musculoskeletal:      Comments: Left ankle dressed-decreased ROM   Skin:     Coloration: Skin is not jaundiced.   Neurological:      General: No focal deficit present.      Mental Status: He is oriented to person, place, and time.   Psychiatric:         Behavior: Behavior normal.       Meds:    Current Facility-Administered Medications:      acetaminophen    [START ON 3/18/2023] linezolid    [START ON 3/18/2023] spironolactone    spironolactone    losartan    cloNIDine    cefepime    hydrALAZINE    carvedilol    isosorbide mononitrate SR    LORazepam    hydrALAZINE    lidocaine    amLODIPine    furosemide    labetalol    atorvastatin    senna-docusate **AND** polyethylene glycol/lytes **AND** magnesium hydroxide **AND** bisacodyl    ondansetron    ondansetron    promethazine    promethazine    prochlorperazine    nicotine    oxyCODONE immediate-release    Labs:  Recent Labs     03/15/23  0008 03/16/23  0131 03/17/23  0202   WBC 10.3 7.7 7.3   RBC 5.27 5.33 5.51   HEMOGLOBIN 14.8 14.7 15.1   HEMATOCRIT 42.9 44.7 45.9   MCV 81.4 83.9 83.3   MCH 28.1 27.6 27.4   RDW 41.7 43.3 42.6   PLATELETCT 199 210 226   MPV 10.2 9.6 9.7       Recent Labs     03/15/23  0112 03/16/23  0131 03/17/23  0202   SODIUM 137 142 141   POTASSIUM 4.1 3.8 3.8   CHLORIDE 102 105 103   CO2 23 27 26   GLUCOSE 117* 108* 155*   BUN 25* 17 22   CPKTOTAL  --  167*  --        Recent Labs     03/15/23  0112 03/16/23  0131 03/17/23  0202   ALBUMIN 3.9 3.8 3.6   CREATININE 1.28 1.14 1.12         Imaging:  DX-ANKLE 3+ VIEWS LEFT    Result Date: 3/12/2023  3/12/2023 7:18 AM HISTORY/REASON FOR EXAM: Atraumatic Pain/Swelling/Deformity; ORIF 5 weeks ago; now with wound infection TECHNIQUE/EXAM DESCRIPTION:  AP, lateral, and oblique views of the 3 ankle. COMPARISON:  February 4, 2023 FINDINGS: Interval postsurgical changes of fibular ORIF are seen. There is small posterior malleolus fracture visualized. Bony fragments adjacent to the medial malleolus are seen. Soft tissue swelling of the ankle is noted.     1.  Postoperative changes of distal fibular ORIF. 2.  Small bony fragments adjacent to the medial malleolus and small posterior malleolus fracture fragment are seen likely from prior injury.     DX-ANKLE 3+ VIEWS LEFT    Result Date: 2/21/2023  Plain film x-ray taken today independently reviewed  "by myself include Ap oblique and lateral views left ankle demonstrating status post ORIF left lateral malleolus ankle fracture and left syndesmosis open repair and fixation.  Hardware is in good position and intact. Anatomic alignment..       Micro:  Results       Procedure Component Value Units Date/Time    BLOOD CULTURE x2 [[id number]] Collected: 03/12/23 0654    Order Status: Completed Specimen: Blood from Peripheral Updated: 03/17/23 0900     Significant Indicator NEG     Source BLD     Site PERIPHERAL     Culture Result No growth after 5 days of incubation.    Narrative:      Per Hospital Policy: Only change Specimen Src: to \"Line\" if  specified by physician order.  No site indicated    BLOOD CULTURE x2 [[id number]] Collected: 03/12/23 0736    Order Status: Completed Specimen: Blood from Peripheral Updated: 03/17/23 0900     Significant Indicator NEG     Source BLD     Site PERIPHERAL     Culture Result No growth after 5 days of incubation.    Narrative:      Per Hospital Policy: Only change Specimen Src: to \"Line\" if  specified by physician order.  Left AC    CULTURE WOUND W/ GRAM STAIN [[id number]]  (Abnormal)  (Susceptibility) Collected: 03/14/23 1040    Order Status: Completed Specimen: Wound Updated: 03/17/23 0820     Significant Indicator POS     Source WND     Site Left Ankle     Culture Result -     Gram Stain Result Few WBCs.  No organisms seen.       Culture Result Klebsiella oxytoca  Rare growth        Enterococcus faecalis  Rare growth      Narrative:      Surgery - swabs received    Susceptibility       Klebsiella oxytoca (1)       Antibiotic Interpretation Microscan   Method Status    Ceftriaxone Sensitive <=1 mcg/mL MURPHY Final    Cefazolin Resistant 16 mcg/mL MURPHY Final    Ciprofloxacin Sensitive <=0.25 mcg/mL MURPHY Final    Cefepime Sensitive <=2 mcg/mL MURPHY Final    Cefuroxime Sensitive <=4 mcg/mL MURPHY Final    Ertapenem Sensitive <=0.5 mcg/mL MURPHY Final    Gentamicin Sensitive <=2 mcg/mL MURPHY Final    " Ampicillin/sulbactam Sensitive 8/4 mcg/mL MURPHY Final    Minocycline Sensitive <=4 mcg/mL MURPHY Final    Moxifloxacin Sensitive <=2 mcg/mL MURPHY Final    Pip/Tazobactam Sensitive <=8 mcg/mL MURPHY Final    Trimeth/Sulfa Sensitive <=0.5/9.5 mcg/mL MURPHY Final    Tigecycline Sensitive <=2 mcg/mL MURPHY Final    Tobramycin Sensitive <=2 mcg/mL MURPHY Final              Enterococcus faecalis (2)       Antibiotic Interpretation Microscan   Method Status    Vancomycin Sensitive 1 mcg/mL MURPHY Final    Ampicillin Sensitive <=2 mcg/mL MURPHY Final    Daptomycin Sensitive 1 mcg/mL MURPHY Final    Gent Synergy Sensitive <=500 mcg/mL MURPHY Final    Penicillin Sensitive 2 mcg/mL MURPHY Final                       Anaerobic Culture [[id number]] Collected: 03/14/23 1040    Order Status: Completed Specimen: Wound Updated: 03/17/23 0820     Significant Indicator NEG     Source WND     Site Left Ankle     Culture Result Culture in progress.    Narrative:      Surgery - swabs received    Anaerobic Culture [[id number]] Collected: 03/14/23 1040    Order Status: Completed Specimen: Tissue Updated: 03/16/23 1234     Significant Indicator NEG     Source TISS     Site Left Ankle     Culture Result Culture in progress.    Narrative:      Surgery Specimen    Fungal Culture [[id number]] Collected: 03/14/23 1040    Order Status: Completed Specimen: Tissue Updated: 03/16/23 1234     Significant Indicator NEG     Source TISS     Site Left Ankle     Culture Result Culture in progress.     Fungal Smear Results No fungal elements seen.    Narrative:      Surgery Specimen    AFB Culture [[id number]] Collected: 03/14/23 1040    Order Status: Completed Specimen: Tissue Updated: 03/16/23 1234     Significant Indicator NEG     Source TISS     Site Left Ankle     Culture Result Culture in progress.     AFB Smear Results No acid fast bacilli seen.    Narrative:      Surgery Specimen    CULTURE TISSUE W/ GRM STAIN [[id number]]  (Abnormal) Collected: 03/14/23 1040    Order Status: Completed  Specimen: Tissue Updated: 03/16/23 1234     Significant Indicator POS     Source TISS     Site Left Ankle     Culture Result -     Gram Stain Result No organisms seen.     Culture Result Proteus mirabilis  Rare growth      Narrative:      Surgery Specimen    GRAM STAIN [[id number]] Collected: 03/14/23 1040    Order Status: Completed Specimen: Tissue Updated: 03/15/23 2013     Significant Indicator .     Source TISS     Site Left Ankle     Gram Stain Result No organisms seen.    Narrative:      Surgery Specimen    Acid Fast Stain [[id number]] Collected: 03/14/23 1040    Order Status: Completed Specimen: Tissue Updated: 03/15/23 2013     Significant Indicator NEG     Source TISS     Site Left Ankle     AFB Smear Results No acid fast bacilli seen.    Narrative:      Surgery Specimen    Fungal Smear [[id number]] Collected: 03/14/23 1040    Order Status: Completed Specimen: Tissue Updated: 03/15/23 2013     Significant Indicator NEG     Source TISS     Site Left Ankle     Fungal Smear Results No fungal elements seen.    Narrative:      Surgery Specimen    GRAM STAIN [[id number]] Collected: 03/14/23 1040    Order Status: Completed Specimen: Wound Updated: 03/14/23 1747     Significant Indicator .     Source WND     Site Left Ankle     Gram Stain Result Few WBCs.  No organisms seen.      Narrative:      Surgery - swabs received    CULTURE WOUND W/ GRAM STAIN [[id number]] Collected: 03/12/23 0730    Order Status: Completed Specimen: Wound from Left Ankle Updated: 03/14/23 1725     Significant Indicator NEG     Source WND     Site LEFT ANKLE     Culture Result Light growth mixed enteric elisa.     Gram Stain Result No WBCs seen.  Few Gram positive cocci.      MRSA By PCR (Amp) [[id number]]     Order Status: No result Specimen: Respirate from Nares     GRAM STAIN [[id number]] Collected: 03/12/23 0730    Order Status: Completed Specimen: Wound Updated: 03/12/23 1803     Significant Indicator .     Source WND     Site LEFT ANKLE      Gram Stain Result No WBCs seen.  Few Gram positive cocci.              Assessment:  Active Hospital Problems    Diagnosis     *Postoperative wound infection [T81.49XA]     Pseudogout [M11.20]     Dependence on nicotine from cigarettes [F17.210]     Chronic systolic CHF (congestive heart failure) (HCC) [I50.22]     Hypertensive urgency [I16.0]      Infected Left ORIF site with hardware in place  CHF  Noncompliance BP meds/uncontrolled HTN-improved since admission   Polymicrobial wound infection Efaecalis, Klebsiella, Proteus     PLAN:   FU culture sensitivities  Started daptomycin and cefepime rather than zosyn due to volume/sodium load of zosyn  Monitor CPK and BMP  Management BP per PCT-renal USG done  Final antibiotic recommendations per culture results and clinical course-patient wants to go to Infusion center if possible  Per pharm: dapto shortage so requested to switch to zyvox for now  OK to place PICC    Prognosis for limb salvage guarded  DW Pharm/Ortho/Zoltan

## 2023-03-17 NOTE — PROGRESS NOTES
0450 Patient requesting antibiotic to be hung now. Informed patient of other medications due as well. Blood pressure taken- 185/125, informed patient about Labetolol to bring this down on top of scheduled medication. Refused Labetolol, educated. Communicated with CNA to recheck BP in 40 minutes.    0610 BP recheck- 153/113. Patient requesting tylenol for headache. Ordered tylenol brought to bedside. Informed patient of dosage, Patient stating he has always had 2 of the 500mg not 2 of the 325mg. Reviewed orders and reviewed orders with patient. Patient stating he still want the 500mg instead, patient educated that RN will have to reach out to hospitalist for new order. Requesting to speak with charge RN. Charge brought to bedside and message sent to Hospitalist Eitan for an order change.     0640 Order changed, awaiting approval, charge informed

## 2023-03-18 ENCOUNTER — APPOINTMENT (OUTPATIENT)
Dept: RADIOLOGY | Facility: MEDICAL CENTER | Age: 48
End: 2023-03-18
Attending: EMERGENCY MEDICINE
Payer: COMMERCIAL

## 2023-03-18 ENCOUNTER — HOSPITAL ENCOUNTER (EMERGENCY)
Facility: MEDICAL CENTER | Age: 48
End: 2023-03-19
Attending: EMERGENCY MEDICINE
Payer: COMMERCIAL

## 2023-03-18 ENCOUNTER — PHARMACY VISIT (OUTPATIENT)
Dept: PHARMACY | Facility: MEDICAL CENTER | Age: 48
End: 2023-03-18
Payer: COMMERCIAL

## 2023-03-18 VITALS
WEIGHT: 315 LBS | HEIGHT: 78 IN | OXYGEN SATURATION: 95 % | TEMPERATURE: 98.4 F | RESPIRATION RATE: 19 BRPM | HEART RATE: 92 BPM | DIASTOLIC BLOOD PRESSURE: 95 MMHG | BODY MASS INDEX: 36.45 KG/M2 | SYSTOLIC BLOOD PRESSURE: 153 MMHG

## 2023-03-18 DIAGNOSIS — R55 NEAR SYNCOPE: ICD-10-CM

## 2023-03-18 DIAGNOSIS — I10 HYPERTENSION, UNSPECIFIED TYPE: Primary | ICD-10-CM

## 2023-03-18 LAB
ALBUMIN SERPL BCP-MCNC: 3.9 G/DL (ref 3.2–4.9)
BACTERIA TISS AEROBE CULT: ABNORMAL
BACTERIA TISS AEROBE CULT: ABNORMAL
BASOPHILS # BLD AUTO: 0.6 % (ref 0–1.8)
BASOPHILS # BLD: 0.06 K/UL (ref 0–0.12)
BUN SERPL-MCNC: 24 MG/DL (ref 8–22)
CALCIUM ALBUM COR SERPL-MCNC: 9.4 MG/DL (ref 8.5–10.5)
CALCIUM SERPL-MCNC: 9.3 MG/DL (ref 8.5–10.5)
CHLORIDE SERPL-SCNC: 104 MMOL/L (ref 96–112)
CO2 SERPL-SCNC: 28 MMOL/L (ref 20–33)
CREAT SERPL-MCNC: 1.11 MG/DL (ref 0.5–1.4)
EKG IMPRESSION: NORMAL
EOSINOPHIL # BLD AUTO: 0.26 K/UL (ref 0–0.51)
EOSINOPHIL NFR BLD: 2.8 % (ref 0–6.9)
ERYTHROCYTE [DISTWIDTH] IN BLOOD BY AUTOMATED COUNT: 41.3 FL (ref 35.9–50)
GFR SERPLBLD CREATININE-BSD FMLA CKD-EPI: 82 ML/MIN/1.73 M 2
GLUCOSE SERPL-MCNC: 155 MG/DL (ref 65–99)
GRAM STN SPEC: ABNORMAL
HCT VFR BLD AUTO: 44.3 % (ref 42–52)
HGB BLD-MCNC: 14.8 G/DL (ref 14–18)
IMM GRANULOCYTES # BLD AUTO: 0.05 K/UL (ref 0–0.11)
IMM GRANULOCYTES NFR BLD AUTO: 0.5 % (ref 0–0.9)
LYMPHOCYTES # BLD AUTO: 1.79 K/UL (ref 1–4.8)
LYMPHOCYTES NFR BLD: 18.9 % (ref 22–41)
MCH RBC QN AUTO: 27.1 PG (ref 27–33)
MCHC RBC AUTO-ENTMCNC: 33.4 G/DL (ref 33.7–35.3)
MCV RBC AUTO: 81 FL (ref 81.4–97.8)
MONOCYTES # BLD AUTO: 0.91 K/UL (ref 0–0.85)
MONOCYTES NFR BLD AUTO: 9.6 % (ref 0–13.4)
NEUTROPHILS # BLD AUTO: 6.38 K/UL (ref 1.82–7.42)
NEUTROPHILS NFR BLD: 67.6 % (ref 44–72)
NRBC # BLD AUTO: 0 K/UL
NRBC BLD-RTO: 0 /100 WBC
PHOSPHATE SERPL-MCNC: 3.7 MG/DL (ref 2.5–4.5)
PLATELET # BLD AUTO: 222 K/UL (ref 164–446)
PMV BLD AUTO: 9.9 FL (ref 9–12.9)
POTASSIUM SERPL-SCNC: 3.6 MMOL/L (ref 3.6–5.5)
RBC # BLD AUTO: 5.47 M/UL (ref 4.7–6.1)
SIGNIFICANT IND 70042: ABNORMAL
SITE SITE: ABNORMAL
SODIUM SERPL-SCNC: 140 MMOL/L (ref 135–145)
SOURCE SOURCE: ABNORMAL
WBC # BLD AUTO: 9.5 K/UL (ref 4.8–10.8)

## 2023-03-18 PROCEDURE — 80053 COMPREHEN METABOLIC PANEL: CPT

## 2023-03-18 PROCEDURE — A9270 NON-COVERED ITEM OR SERVICE: HCPCS | Performed by: HOSPITALIST

## 2023-03-18 PROCEDURE — A9270 NON-COVERED ITEM OR SERVICE: HCPCS | Performed by: INTERNAL MEDICINE

## 2023-03-18 PROCEDURE — 700102 HCHG RX REV CODE 250 W/ 637 OVERRIDE(OP): Performed by: HOSPITALIST

## 2023-03-18 PROCEDURE — 700102 HCHG RX REV CODE 250 W/ 637 OVERRIDE(OP): Performed by: INTERNAL MEDICINE

## 2023-03-18 PROCEDURE — 71045 X-RAY EXAM CHEST 1 VIEW: CPT

## 2023-03-18 PROCEDURE — 84484 ASSAY OF TROPONIN QUANT: CPT

## 2023-03-18 PROCEDURE — A9270 NON-COVERED ITEM OR SERVICE: HCPCS | Performed by: STUDENT IN AN ORGANIZED HEALTH CARE EDUCATION/TRAINING PROGRAM

## 2023-03-18 PROCEDURE — 700102 HCHG RX REV CODE 250 W/ 637 OVERRIDE(OP): Performed by: STUDENT IN AN ORGANIZED HEALTH CARE EDUCATION/TRAINING PROGRAM

## 2023-03-18 PROCEDURE — RXMED WILLOW AMBULATORY MEDICATION CHARGE: Performed by: HOSPITALIST

## 2023-03-18 PROCEDURE — 85025 COMPLETE CBC W/AUTO DIFF WBC: CPT

## 2023-03-18 PROCEDURE — 36415 COLL VENOUS BLD VENIPUNCTURE: CPT

## 2023-03-18 PROCEDURE — 80069 RENAL FUNCTION PANEL: CPT

## 2023-03-18 PROCEDURE — 700105 HCHG RX REV CODE 258: Performed by: INTERNAL MEDICINE

## 2023-03-18 PROCEDURE — 93005 ELECTROCARDIOGRAM TRACING: CPT | Performed by: EMERGENCY MEDICINE

## 2023-03-18 PROCEDURE — 700102 HCHG RX REV CODE 250 W/ 637 OVERRIDE(OP): Performed by: NURSE PRACTITIONER

## 2023-03-18 PROCEDURE — 99239 HOSP IP/OBS DSCHRG MGMT >30: CPT | Performed by: HOSPITALIST

## 2023-03-18 PROCEDURE — 700111 HCHG RX REV CODE 636 W/ 250 OVERRIDE (IP): Performed by: INTERNAL MEDICINE

## 2023-03-18 PROCEDURE — 99233 SBSQ HOSP IP/OBS HIGH 50: CPT | Performed by: INTERNAL MEDICINE

## 2023-03-18 PROCEDURE — 99283 EMERGENCY DEPT VISIT LOW MDM: CPT

## 2023-03-18 PROCEDURE — A9270 NON-COVERED ITEM OR SERVICE: HCPCS | Performed by: NURSE PRACTITIONER

## 2023-03-18 RX ORDER — AMLODIPINE BESYLATE 10 MG/1
10 TABLET ORAL DAILY
Qty: 30 TABLET | Refills: 0 | Status: SHIPPED | OUTPATIENT
Start: 2023-03-19 | End: 2023-04-17 | Stop reason: SDUPTHER

## 2023-03-18 RX ORDER — POLYETHYLENE GLYCOL 3350 17 G/17G
17 POWDER, FOR SOLUTION ORAL
Qty: 15 EACH | Refills: 3 | Status: SHIPPED | OUTPATIENT
Start: 2023-03-18

## 2023-03-18 RX ORDER — HYDRALAZINE HYDROCHLORIDE 100 MG/1
100 TABLET, FILM COATED ORAL EVERY 8 HOURS
Qty: 90 TABLET | Refills: 0 | Status: SHIPPED | OUTPATIENT
Start: 2023-03-18 | End: 2023-04-17 | Stop reason: SDUPTHER

## 2023-03-18 RX ORDER — CLONIDINE HYDROCHLORIDE 0.1 MG/1
0.1 TABLET ORAL 2 TIMES DAILY PRN
Qty: 60 TABLET | Refills: 0 | Status: SHIPPED | OUTPATIENT
Start: 2023-03-18 | End: 2023-04-17

## 2023-03-18 RX ORDER — LOSARTAN POTASSIUM 100 MG/1
100 TABLET ORAL EVERY EVENING
Qty: 30 TABLET | Refills: 0 | Status: SHIPPED | OUTPATIENT
Start: 2023-03-18 | End: 2023-04-17 | Stop reason: SDUPTHER

## 2023-03-18 RX ORDER — ACETAMINOPHEN 500 MG
1000 TABLET ORAL EVERY 8 HOURS PRN
Qty: 30 TABLET | Refills: 0 | Status: SHIPPED | OUTPATIENT
Start: 2023-03-18 | End: 2023-03-25

## 2023-03-18 RX ORDER — SPIRONOLACTONE 50 MG/1
50 TABLET, FILM COATED ORAL DAILY
Qty: 30 TABLET | Refills: 3 | Status: SHIPPED | OUTPATIENT
Start: 2023-03-19 | End: 2023-04-17 | Stop reason: SDUPTHER

## 2023-03-18 RX ORDER — HEPARIN SODIUM (PORCINE) LOCK FLUSH IV SOLN 100 UNIT/ML 100 UNIT/ML
500 SOLUTION INTRAVENOUS PRN
Status: CANCELLED | OUTPATIENT
Start: 2023-03-18

## 2023-03-18 RX ORDER — FUROSEMIDE 20 MG/1
20 TABLET ORAL 2 TIMES DAILY
Qty: 60 TABLET | Refills: 3 | Status: SHIPPED | OUTPATIENT
Start: 2023-03-18 | End: 2023-04-17 | Stop reason: SDUPTHER

## 2023-03-18 RX ORDER — ATORVASTATIN CALCIUM 10 MG/1
10 TABLET, FILM COATED ORAL NIGHTLY
Qty: 30 TABLET | Refills: 0 | Status: SHIPPED | OUTPATIENT
Start: 2023-03-18 | End: 2023-04-17

## 2023-03-18 RX ORDER — CARVEDILOL 25 MG/1
25 TABLET ORAL 2 TIMES DAILY WITH MEALS
Qty: 60 TABLET | Refills: 0 | Status: SHIPPED | OUTPATIENT
Start: 2023-03-18 | End: 2023-04-17 | Stop reason: SDUPTHER

## 2023-03-18 RX ORDER — AMOXICILLIN 250 MG
2 CAPSULE ORAL 2 TIMES DAILY
Qty: 30 TABLET | Refills: 0 | Status: SHIPPED | OUTPATIENT
Start: 2023-03-18

## 2023-03-18 RX ORDER — 0.9 % SODIUM CHLORIDE 0.9 %
10 VIAL (ML) INJECTION PRN
Status: CANCELLED | OUTPATIENT
Start: 2023-03-18

## 2023-03-18 RX ORDER — OXYCODONE HYDROCHLORIDE 5 MG/1
5-10 TABLET ORAL EVERY 8 HOURS PRN
Qty: 15 TABLET | Refills: 0 | Status: SHIPPED | OUTPATIENT
Start: 2023-03-18 | End: 2023-03-23

## 2023-03-18 RX ORDER — ISOSORBIDE MONONITRATE 60 MG/1
60 TABLET, EXTENDED RELEASE ORAL DAILY
Qty: 30 TABLET | Refills: 0 | Status: SHIPPED | OUTPATIENT
Start: 2023-03-19 | End: 2023-04-17 | Stop reason: SDUPTHER

## 2023-03-18 RX ORDER — 0.9 % SODIUM CHLORIDE 0.9 %
3 VIAL (ML) INJECTION PRN
Status: CANCELLED | OUTPATIENT
Start: 2023-03-18

## 2023-03-18 RX ORDER — 0.9 % SODIUM CHLORIDE 0.9 %
VIAL (ML) INJECTION PRN
Status: CANCELLED | OUTPATIENT
Start: 2023-03-18

## 2023-03-18 RX ORDER — CLONIDINE HYDROCHLORIDE 0.1 MG/1
0.1 TABLET ORAL 3 TIMES DAILY
Status: DISCONTINUED | OUTPATIENT
Start: 2023-03-18 | End: 2023-03-18 | Stop reason: HOSPADM

## 2023-03-18 RX ADMIN — CARVEDILOL 25 MG: 6.25 TABLET, FILM COATED ORAL at 08:42

## 2023-03-18 RX ADMIN — ISOSORBIDE MONONITRATE 60 MG: 60 TABLET, EXTENDED RELEASE ORAL at 06:11

## 2023-03-18 RX ADMIN — Medication 1 APPLICATOR: at 06:00

## 2023-03-18 RX ADMIN — LABETALOL HYDROCHLORIDE 10 MG: 5 INJECTION INTRAVENOUS at 06:14

## 2023-03-18 RX ADMIN — CEFTRIAXONE SODIUM 2000 MG: 10 INJECTION, POWDER, FOR SOLUTION INTRAVENOUS at 11:06

## 2023-03-18 RX ADMIN — ACETAMINOPHEN 1000 MG: 500 TABLET, FILM COATED ORAL at 15:51

## 2023-03-18 RX ADMIN — LINEZOLID 600 MG: 600 TABLET, FILM COATED ORAL at 06:02

## 2023-03-18 RX ADMIN — ACETAMINOPHEN 1000 MG: 500 TABLET, FILM COATED ORAL at 08:43

## 2023-03-18 RX ADMIN — CEFEPIME 2 G: 2 INJECTION, POWDER, FOR SOLUTION INTRAVENOUS at 05:57

## 2023-03-18 RX ADMIN — CLONIDINE HYDROCHLORIDE 0.1 MG: 0.1 TABLET ORAL at 11:04

## 2023-03-18 RX ADMIN — FUROSEMIDE 20 MG: 20 TABLET ORAL at 11:04

## 2023-03-18 RX ADMIN — HYDRALAZINE HYDROCHLORIDE 100 MG: 50 TABLET, FILM COATED ORAL at 15:51

## 2023-03-18 RX ADMIN — SPIRONOLACTONE 50 MG: 25 TABLET ORAL at 06:10

## 2023-03-18 RX ADMIN — HYDRALAZINE HYDROCHLORIDE 100 MG: 50 TABLET, FILM COATED ORAL at 06:01

## 2023-03-18 ASSESSMENT — FIBROSIS 4 INDEX
FIB4 SCORE: 0.95
FIB4 SCORE: 0.95

## 2023-03-18 ASSESSMENT — PAIN DESCRIPTION - PAIN TYPE
TYPE: SURGICAL PAIN

## 2023-03-18 ASSESSMENT — ENCOUNTER SYMPTOMS
FEVER: 0
SHORTNESS OF BREATH: 0

## 2023-03-18 NOTE — CARE PLAN
The patient is Stable - Low risk of patient condition declining or worsening    Shift Goals  Clinical Goals: Monitor and maintain SBP < 160  Patient Goals: BP control  Family Goals: CIARA    Progress made toward(s) clinical / shift goals:      Problem: Knowledge Deficit - Standard  Goal: Patient and family/care givers will demonstrate understanding of plan of care, disease process/condition, diagnostic tests and medications  Outcome: Progressing   Pt displays an understanding surrounding his POC, medications, treatment, and discharge planning. He asks appropriate questions and is informed in regards to an SBP > 160 requires prn medications. He also asks appropriate questions.     Problem: Pain - Standard  Goal: Alleviation of pain or a reduction in pain to the patient’s comfort goal  Outcome: Progressing  Pt reports pain has decreased since admission and has been at a tolerable level this shift, no requiring prn opioids.      Problem: Fall Risk  Goal: Patient will remain free from falls  Outcome: Progressing   Pt assessed as a low fall risk. All low risk precautions in effect.     Patient is not progressing towards the following goals:

## 2023-03-18 NOTE — PROGRESS NOTES
Bedside report received. Assumed care of patient 3/17 PM. Assessment completed      Patient is A&O x 4, pt calls for assistance appropriately  Reports 0 /10 pain, no prn medication administered. Pt provided with ice pack for sx site  Pt is r/a  Mobility up self, FWW available if needed   Bed alarm in off, low fall risk.  Voiding +  Flatus +  Pt's L ankle dressing CDI; no orders to replace it. No drainage present.         Plan of care reviewed with the patient. Bed is locked and in the lowest position. Call light is within reach. Patient encouraged to voice needs and concerns, all needs met at this time. Hourly rounding in place.

## 2023-03-18 NOTE — CARE PLAN
The patient is Stable - Low risk of patient condition declining or worsening    Shift Goals  Clinical Goals: pain mgmt, monitor  Patient Goals: discharge home  Family Goals: not present    Progress made toward(s) clinical / shift goals:    Problem: Fall Risk  Goal: Patient will remain free from falls  Outcome: Progressing  Note: Safety precautions are in place including bed locked and in lowest position, upper bed rails up, bed alarm on, call light within reach, treaded socks on, tray table and personal belongings within reach.        Patient is not progressing towards the following goals:

## 2023-03-18 NOTE — PROGRESS NOTES
"Infectious Disease Progress Note    Author: Delores Fields M.D. Date & Time of service: 3/18/2023  11:11 AM    Chief Complaint:   infected ankle after ORIF     Interval History:  47 y.o. male admitted 3/12/2023 for drainage and swelling left ankle   3/14 AF WBC 6.8 creat 1.01 plan for OR today culture now also enteric elisa BP remain poorly controlled  3/15 AF WBC 10.3 sleeping at time of rounds cultures neg Op note reviewed-debrided through muscle. No gaby pus or necrosis  3/16 AF plan of care reviewed after rounds-cultures are now polymicrobial  3/17 AF WBC 7.3 continued problems with blood pressure Culture results and abx discussed  3/18 AF states BP up today because told he had to stay in hospital to get it \"controlled\" but it has been intermittently high for over a year and outpatient regimen was working better than current regimen. Upset because needs to get back to work in order to pay rent. Denies SE abx-plan reviewed Proteus pan susceptible  Labs Reviewed and Medications Reviewed.    Review of Systems:  Review of Systems   Constitutional:  Negative for fever.   Respiratory:  Negative for shortness of breath.    Musculoskeletal:  Positive for joint pain.   All other systems reviewed and are negative.    Hemodynamics:  Temp (24hrs), Av.9 °C (98.4 °F), Min:36.8 °C (98.2 °F), Max:37.1 °C (98.7 °F)  Temperature: 36.9 °C (98.4 °F)  Pulse  Av.3  Min: 75  Max: 110   Blood Pressure: (!) 165/108       Physical Exam:  Physical Exam  Vitals and nursing note reviewed.   Constitutional:       General: He is not in acute distress.     Appearance: He is not ill-appearing, toxic-appearing or diaphoretic.   HENT:      Nose: No rhinorrhea.   Eyes:      General: No scleral icterus.  Cardiovascular:      Rate and Rhythm: Normal rate.   Pulmonary:      Effort: No respiratory distress.   Abdominal:      General: There is no distension.      Tenderness: There is no abdominal tenderness.   Musculoskeletal:      " Comments: Left ankle dressed-  RUE no erythema or bruising around PICC   Skin:     Coloration: Skin is not jaundiced.   Neurological:      General: No focal deficit present.      Mental Status: He is alert and oriented to person, place, and time.   Psychiatric:         Mood and Affect: Mood normal.         Behavior: Behavior normal.       Meds:    Current Facility-Administered Medications:     cefTRIAXone (ROCEPHIN) IV    cloNIDine    acetaminophen    linezolid    spironolactone    losartan    Nozin nasal  swab    cloNIDine    hydrALAZINE    carvedilol    isosorbide mononitrate SR    LORazepam    hydrALAZINE    lidocaine    amLODIPine    furosemide    labetalol    atorvastatin    senna-docusate **AND** polyethylene glycol/lytes **AND** magnesium hydroxide **AND** bisacodyl    ondansetron    ondansetron    promethazine    promethazine    prochlorperazine    nicotine    oxyCODONE immediate-release    Labs:  Recent Labs     03/16/23 0131 03/17/23  0202   WBC 7.7 7.3   RBC 5.33 5.51   HEMOGLOBIN 14.7 15.1   HEMATOCRIT 44.7 45.9   MCV 83.9 83.3   MCH 27.6 27.4   RDW 43.3 42.6   PLATELETCT 210 226   MPV 9.6 9.7       Recent Labs     03/16/23 0131 03/17/23  0202 03/18/23  0330   SODIUM 142 141 140   POTASSIUM 3.8 3.8 3.6   CHLORIDE 105 103 104   CO2 27 26 28   GLUCOSE 108* 155* 155*   BUN 17 22 24*   CPKTOTAL 167*  --   --        Recent Labs     03/16/23 0131 03/17/23  0202 03/18/23  0330   ALBUMIN 3.8 3.6 3.9   CREATININE 1.14 1.12 1.11         Imaging:  DX-ANKLE 3+ VIEWS LEFT    Result Date: 3/12/2023  3/12/2023 7:18 AM HISTORY/REASON FOR EXAM: Atraumatic Pain/Swelling/Deformity; ORIF 5 weeks ago; now with wound infection TECHNIQUE/EXAM DESCRIPTION:  AP, lateral, and oblique views of the 3 ankle. COMPARISON:  February 4, 2023 FINDINGS: Interval postsurgical changes of fibular ORIF are seen. There is small posterior malleolus fracture visualized. Bony fragments adjacent to the medial malleolus are seen. Soft  tissue swelling of the ankle is noted.     1.  Postoperative changes of distal fibular ORIF. 2.  Small bony fragments adjacent to the medial malleolus and small posterior malleolus fracture fragment are seen likely from prior injury.     DX-ANKLE 3+ VIEWS LEFT    Result Date: 2/21/2023  Plain film x-ray taken today independently reviewed by myself include Ap oblique and lateral views left ankle demonstrating status post ORIF left lateral malleolus ankle fracture and left syndesmosis open repair and fixation.  Hardware is in good position and intact. Anatomic alignment..       Micro:  Results       Procedure Component Value Units Date/Time    CULTURE TISSUE W/ GRM STAIN [[id number]]  (Abnormal)  (Susceptibility) Collected: 03/14/23 1040    Order Status: Completed Specimen: Tissue Updated: 03/18/23 0722     Significant Indicator POS     Source TISS     Site Left Ankle     Culture Result -     Gram Stain Result No organisms seen.     Culture Result Proteus mirabilis  Rare growth      Narrative:      Surgery Specimen    Susceptibility       Proteus mirabilis (2)       Antibiotic Interpretation Microscan   Method Status    Ampicillin Sensitive <=8 mcg/mL MURPHY Final    Ceftriaxone Sensitive <=1 mcg/mL MURPHY Final    Cefazolin Sensitive <=2 mcg/mL MURPHY Final    Ciprofloxacin Sensitive <=0.25 mcg/mL MURPHY Final    Cefepime Sensitive <=2 mcg/mL MURPHY Final    Ampicillin/sulbactam Sensitive <=4/2 mcg/mL MURPHY Final    Cefuroxime Sensitive <=4 mcg/mL MURPHY Final    Tobramycin Sensitive <=2 mcg/mL MURPHY Final    Ertapenem Sensitive <=0.5 mcg/mL MURPHY Final    Gentamicin Sensitive <=2 mcg/mL MURPHY Final    Minocycline Resistant >8 mcg/mL MURPHY Final    Moxifloxacin Sensitive <=2 mcg/mL MURPHY Final    Pip/Tazobactam Sensitive <=8 mcg/mL MURPHY Final    Trimeth/Sulfa Sensitive <=0.5/9.5 mcg/mL MURPHY Final                       Anaerobic Culture [[id number]] Collected: 03/14/23 1040    Order Status: Completed Specimen: Tissue Updated: 03/18/23 0763      "Significant Indicator NEG     Source TISS     Site Left Ankle     Culture Result Culture in progress.    Narrative:      Surgery Specimen    Fungal Culture [[id number]] Collected: 03/14/23 1040    Order Status: Completed Specimen: Tissue Updated: 03/18/23 0725     Significant Indicator NEG     Source TISS     Site Left Ankle     Culture Result Culture in progress.     Fungal Smear Results No fungal elements seen.    Narrative:      Surgery Specimen    AFB Culture [[id number]] Collected: 03/14/23 1040    Order Status: Completed Specimen: Tissue Updated: 03/18/23 0725     Significant Indicator NEG     Source TISS     Site Left Ankle     Culture Result Culture in progress.     AFB Smear Results No acid fast bacilli seen.    Narrative:      Surgery Specimen    BLOOD CULTURE x2 [[id number]] Collected: 03/12/23 0654    Order Status: Completed Specimen: Blood from Peripheral Updated: 03/17/23 0900     Significant Indicator NEG     Source BLD     Site PERIPHERAL     Culture Result No growth after 5 days of incubation.    Narrative:      Per Hospital Policy: Only change Specimen Src: to \"Line\" if  specified by physician order.  No site indicated    BLOOD CULTURE x2 [[id number]] Collected: 03/12/23 0736    Order Status: Completed Specimen: Blood from Peripheral Updated: 03/17/23 0900     Significant Indicator NEG     Source BLD     Site PERIPHERAL     Culture Result No growth after 5 days of incubation.    Narrative:      Per Hospital Policy: Only change Specimen Src: to \"Line\" if  specified by physician order.  Left AC    CULTURE WOUND W/ GRAM STAIN [[id number]]  (Abnormal)  (Susceptibility) Collected: 03/14/23 1040    Order Status: Completed Specimen: Wound Updated: 03/17/23 0820     Significant Indicator POS     Source WND     Site Left Ankle     Culture Result -     Gram Stain Result Few WBCs.  No organisms seen.       Culture Result Klebsiella oxytoca  Rare growth        Enterococcus faecalis  Rare growth      Narrative:      " Surgery - swabs received    Susceptibility       Klebsiella oxytoca (1)       Antibiotic Interpretation Microscan   Method Status    Ceftriaxone Sensitive <=1 mcg/mL MURPHY Final    Cefazolin Resistant 16 mcg/mL MURPHY Final    Ciprofloxacin Sensitive <=0.25 mcg/mL MURPHY Final    Cefepime Sensitive <=2 mcg/mL MURPHY Final    Cefuroxime Sensitive <=4 mcg/mL MURPHY Final    Ertapenem Sensitive <=0.5 mcg/mL MURPHY Final    Gentamicin Sensitive <=2 mcg/mL MURPHY Final    Ampicillin/sulbactam Sensitive 8/4 mcg/mL MURPHY Final    Minocycline Sensitive <=4 mcg/mL MURPHY Final    Moxifloxacin Sensitive <=2 mcg/mL MURPHY Final    Pip/Tazobactam Sensitive <=8 mcg/mL MURPHY Final    Trimeth/Sulfa Sensitive <=0.5/9.5 mcg/mL MURPHY Final    Tigecycline Sensitive <=2 mcg/mL MURPHY Final    Tobramycin Sensitive <=2 mcg/mL MURPHY Final              Enterococcus faecalis (2)       Antibiotic Interpretation Microscan   Method Status    Vancomycin Sensitive 1 mcg/mL MURPHY Final    Ampicillin Sensitive <=2 mcg/mL MURPHY Final    Daptomycin Sensitive 1 mcg/mL MURPHY Final    Gent Synergy Sensitive <=500 mcg/mL MURPHY Final    Penicillin Sensitive 2 mcg/mL MURPHY Final                       Anaerobic Culture [[id number]] Collected: 03/14/23 1040    Order Status: Completed Specimen: Wound Updated: 03/17/23 0820     Significant Indicator NEG     Source WND     Site Left Ankle     Culture Result Culture in progress.    Narrative:      Surgery - swabs received    GRAM STAIN [[id number]] Collected: 03/14/23 1040    Order Status: Completed Specimen: Tissue Updated: 03/15/23 2013     Significant Indicator .     Source TISS     Site Left Ankle     Gram Stain Result No organisms seen.    Narrative:      Surgery Specimen    Acid Fast Stain [[id number]] Collected: 03/14/23 1040    Order Status: Completed Specimen: Tissue Updated: 03/15/23 2013     Significant Indicator NEG     Source TISS     Site Left Ankle     AFB Smear Results No acid fast bacilli seen.    Narrative:      Surgery Specimen    Fungal  Smear [[id number]] Collected: 03/14/23 1040    Order Status: Completed Specimen: Tissue Updated: 03/15/23 2013     Significant Indicator NEG     Source TISS     Site Left Ankle     Fungal Smear Results No fungal elements seen.    Narrative:      Surgery Specimen    GRAM STAIN [[id number]] Collected: 03/14/23 1040    Order Status: Completed Specimen: Wound Updated: 03/14/23 1747     Significant Indicator .     Source WND     Site Left Ankle     Gram Stain Result Few WBCs.  No organisms seen.      Narrative:      Surgery - swabs received    CULTURE WOUND W/ GRAM STAIN [[id number]] Collected: 03/12/23 0730    Order Status: Completed Specimen: Wound from Left Ankle Updated: 03/14/23 1725     Significant Indicator NEG     Source WND     Site LEFT ANKLE     Culture Result Light growth mixed enteric elisa.     Gram Stain Result No WBCs seen.  Few Gram positive cocci.      MRSA By PCR (Amp) [[id number]]     Order Status: No result Specimen: Respirate from Nares     GRAM STAIN [[id number]] Collected: 03/12/23 0730    Order Status: Completed Specimen: Wound Updated: 03/12/23 1803     Significant Indicator .     Source WND     Site LEFT ANKLE     Gram Stain Result No WBCs seen.  Few Gram positive cocci.              Assessment:  Active Hospital Problems    Diagnosis     *Postoperative wound infection [T81.49XA]     Pseudogout [M11.20]     Dependence on nicotine from cigarettes [F17.210]     Chronic systolic CHF (congestive heart failure) (HCC) [I50.22]     Hypertensive urgency [I16.0]      Infected Left ORIF site with hardware in place  CHF  Uncontrolled HTN-improved overall since admission  Polymicrobial wound infection Efaecalis, Klebsiella, Proteus     PLAN:   Monitor CPK and BMP weekly-mild elevation CPK at baseline/post-op  Management BP per PCT-renal USG done  PICC in place-no complications  Per pharmacy: switched to Zyvox temporarily due to daptomycin shortage  Orders for daptomycin +ceftriaxone through 4/25/2023 placed in  Bea CHEATHAM from ID standpoint for discharge once cleared by Ortho and antibiotics arranged    [facility] ID clinic 2 weeks-OK to see SAMAN WHITESIDE    Prognosis for limb salvage guarded  [facility]

## 2023-03-18 NOTE — DISCHARGE SUMMARY
Discharge Summary    CHIEF COMPLAINT ON ADMISSION  Chief Complaint   Patient presents with    Wound Check     Pt had ankle surgery to  ankle 5 weeks ago. Now presents with tenderness, swelling, pus, poor healing to surgical wound       Reason for Admission  Wound Check     Admission Date  3/12/2023    CODE STATUS  Full Code    HPI & HOSPITAL COURSE  This is a 47-year-old male with a past medical history significant for hypertension, pseudogout, cardiomyopathy with a EF of 40% was initially admitted from 2/4 to 2/9, he had surgery with Dr. Burleson on 2/4, orthopedic recommended toe-touch weightbearing on the operative extremity.    He presented to the ER on 3/12 with a left ankle infection.  He had a stated out after 16 days; noted to have purulent drainage from the lateral side of the left ankle.  Blood cultures x2 no growth to date, patient underwent I&D; secondary closure of 8 cm wound dehiscence on 3/14.  Per Ortho patient to be full weightbearing on the operative extremity.  Intraoperative culture Proteus and MRSA, patient was initially stable respiratory findings.  ID was consulted, ID recommended continue IV daptomycin plus Rocephin 2GM till 4/25/2023 ..  He will be receiving IV antibiotics at the infusion center while the patient remains on IV antibiotics, need CBC, CMP, ESR, CRP, CPK every week.  Patient will follow-up with renal and ID as an outpatient.    Patient does have uncontrolled high blood pressure, he is currently limited milligram p.o. daily, Coreg confirmed on p.o. twice daily, losartan 100 mg p.o. daily, Lasix 20 mg p.o. twice daily, Aldactone 50 mg p.o. daily, Imdur 60 mg p.o. daily  hydralazine 100 mg p.o. 3 times daily along with as needed clonidine.  Patient should follow-up with primary care physician/hypertension specialist for the management of uncontrolled hypertension.  I ordered CT of the renal arteries, he is renal arteries are patent.    Patient is noted to be very adamant about  "going home.  He is noted to be agitated, not interested in having any further conversation with myself and case Management went to the bedside and updated the plan of care.    ID has given the recommendation on 3/18/2022 in regards to the IV antibiotics.    Therefore, he is discharged in fair and stable condition to home with close outpatient follow-up.    The patient met 2-midnight criteria for an inpatient stay at the time of discharge.    Discharge Date  3/18/2023    FOLLOW UP ITEMS POST DISCHARGE  Juan Davalos M.D.  Follow up with [facility] as an op     DISCHARGE DIAGNOSES  Principal Problem:    Postoperative wound infection POA: Yes  Active Problems:    Hypertensive urgency POA: Yes      Overview: Hx of hypertensive urgency and hx HFrEF 07/2020 ( 30%, improved 2023)      - current discharged on       amlodipine 10mg,       carvedilol 12.5mg BID,       lisinopril 20mg BID,       imdur 30mg daily,       spironolactone 25mg daily       Furosemide 20mg BID            Previously taking ibuprofen 800mg for headache             02/13/2023      - discussed sleep study- patient would like to defer until financial      - discussed secondary workup for HTN however patient would like to defer       at this time    Chronic systolic CHF (congestive heart failure) (HCC) POA: Yes      Overview: Chronic systolic CHF, etiology unknown, first noted 07/2022 presented at       ED with SOB, PND and noted to have hypertensive emergency. Initial       echocardiogram showed LVEF 30%.             Last Echo 02/2023 :       \"Contrast offered but pt refused.      Technically difficult due to body habtus, positioning and pt left leg       in boot from previous surgery but adequate study for interpretation.       Moderately reduced left ventricular systolic function.      The left ventricular ejection fraction is visually estimated to be 40%.      Moderate concentric left ventricular hypertrophy.      Enlarged left ventricular chamber " "size.      Grade I diastolic dysfunction.      Normal right ventricular size and systolic function.      No significant valvular stenosis or regurgitation seen.      Unable to estimate right sided intracardiac pressures.\"    Dependence on nicotine from cigarettes POA: Yes    Pseudogout POA: Yes  Resolved Problems:    * No resolved hospital problems. *      FOLLOW UP  Future Appointments   Date Time Provider Department Center   3/19/2023  4:30 PM [facility] [place]   3/20/2023  6:00 PM [facility] [place]   3/21/2023  1:45 PM [facility]   3/21/2023  2:15 PM Mor Desai P.A.-C. [facility]   3/21/2023  6:15 PM [facility] [place]   3/22/2023  6:15 PM [facility] [place]   3/23/2023  5:00 PM [facility] [place]   3/24/2023  6:15 PM [facility] [place]   3/25/2023  4:00 PM [facility] [place]   3/26/2023  6:30 PM [facility] [place]   3/27/2023  6:15 PM [facility] [place]   3/28/2023  6:15 PM [facility] [place]   3/29/2023  6:00 PM [facility] [place]   3/30/2023  6:15 PM [facility] [place]   3/31/2023  6:15 PM [facility]     No follow-up provider specified.    MEDICATIONS ON DISCHARGE     Medication List        START taking these medications        Instructions   acetaminophen 500 MG Tabs  Commonly known as: TYLENOL   Take 2 Tablets by mouth every 8 hours as needed for Mild Pain for up to 7 days.  Dose: 1,000 mg     carvedilol 25 MG Tabs  Commonly known as: COREG   Take 1 Tablet by mouth 2 times a day with meals.  Dose: 25 mg     cloNIDine 0.1 MG Tabs  Commonly known as: CATAPRES   Take 1 Tablet by mouth 2 times a day as needed (for sbp> 160) for up to 30 days.  Dose: 0.1 mg     hydrALAZINE 100 MG tablet  Commonly known as: APRESOLINE   Take 1 Tablet by mouth every 8 hours.  Dose: " 100 mg     losartan 100 MG Tabs  Commonly known as: COZAAR   Take 1 Tablet by mouth every evening.  Dose: 100 mg     oxyCODONE immediate-release 5 MG Tabs  Commonly known as: ROXICODONE   Take 1-2 Tablets by mouth every 8 hours as needed for Severe Pain for up to 5 days.  Dose: 5-10 mg     polyethylene glycol/lytes 17 g Pack  Commonly known as: MIRALAX   Mix and drink 1 Packet by mouth 1 time a day as needed (if sennosides and docusate ineffective after 24 hours).  Dose: 17 g     senna-docusate 8.6-50 MG Tabs  Commonly known as: PERICOLACE or SENOKOT S   Take 2 Tablets by mouth 2 times a day.  Dose: 2 Tablet            CHANGE how you take these medications        Instructions   isosorbide mononitrate SR 60 MG Tb24  Start taking on: March 19, 2023  What changed:   medication strength  how much to take  Commonly known as: IMDUR   Take 1 Tablet by mouth every day.  Dose: 60 mg     spironolactone 50 MG Tabs  Start taking on: March 19, 2023  What changed:   medication strength  how much to take  Commonly known as: ALDACTONE   Take 1 Tablet by mouth every day.  Dose: 50 mg            CONTINUE taking these medications        Instructions   amLODIPine 10 MG Tabs  Start taking on: March 19, 2023  Commonly known as: NORVASC   Take 1 Tablet by mouth every day for 30 days.  Dose: 10 mg     atorvastatin 10 MG Tabs  Commonly known as: LIPITOR   Take 1 Tablet by mouth every evening for 30 days.  Dose: 10 mg     furosemide 20 MG Tabs  Commonly known as: LASIX   Take 1 Tablet by mouth 2 times a day for 30 days.  Dose: 20 mg              Allergies  No Known Allergies    DIET  Orders Placed This Encounter   Procedures    Diet Order Diet: Regular     Standing Status:   Standing     Number of Occurrences:   1     Order Specific Question:   Diet:     Answer:   Regular [1]       ACTIVITY  As tolerated.  Weight bearing as tolerated    CONSULTAIONS  Ortho  ID      PROCEDURES  Irrigation and debridement and secondary closure of 8 cm wound  dehiscence  LABORATORY  Lab Results   Component Value Date    SODIUM 140 03/18/2023    POTASSIUM 3.6 03/18/2023    CHLORIDE 104 03/18/2023    CO2 28 03/18/2023    GLUCOSE 155 (H) 03/18/2023    BUN 24 (H) 03/18/2023    CREATININE 1.11 03/18/2023        Lab Results   Component Value Date    WBC 7.3 03/17/2023    HEMOGLOBIN 15.1 03/17/2023    HEMATOCRIT 45.9 03/17/2023    PLATELETCT 226 03/17/2023        Total time of the discharge process exceeds 37 minutes.

## 2023-03-18 NOTE — DISCHARGE PLANNING
CM requested into room to help with discharge to infusion clinic for daily antibiotics. CM collaborated with Hunter, RN, and ID nurse for information as CM had not received orders.   While CM was in room with MD, RN, and pt, pt was very vocal, condescending and name calling. CM did leave the conversation to work on IV infusion as pt was not interested in having any further discussion with CM.  CM phoned [facility] as CM was told the orders were in the [place] confirmed orders, however it was for Daptomycin and Ceftriaxone--pt was receiving Ceftriaxone and Zyvox in the hospital. CELY had to reach out to Dr. Fields for clarification. Dr. Fields is wanting pt to be on Daptomycin and Ceftriaxone through 4/25/23 at the Infusion Clinic, Dapto was dc'd in the hospital due to a shortage. Infusion Clinic made aware of this and scheduled pt for 16:30 tomorrow.  CM went into pt's room to provide this information. He was much calmer and asked appropriate questions. CM went over that pt is to be at [facility] at 16:30 tomorrow and if his time needs to be changed he can work that out with the infusion center.   No further needs identified.

## 2023-03-19 ENCOUNTER — OUTPATIENT INFUSION SERVICES (OUTPATIENT)
Dept: ONCOLOGY | Facility: MEDICAL CENTER | Age: 48
End: 2023-03-19
Attending: INTERNAL MEDICINE
Payer: COMMERCIAL

## 2023-03-19 VITALS
BODY MASS INDEX: 36.45 KG/M2 | OXYGEN SATURATION: 92 % | TEMPERATURE: 98 F | WEIGHT: 315 LBS | SYSTOLIC BLOOD PRESSURE: 155 MMHG | HEART RATE: 90 BPM | RESPIRATION RATE: 17 BRPM | DIASTOLIC BLOOD PRESSURE: 82 MMHG | HEIGHT: 78 IN

## 2023-03-19 VITALS
HEIGHT: 78 IN | BODY MASS INDEX: 36.45 KG/M2 | HEART RATE: 111 BPM | WEIGHT: 315 LBS | TEMPERATURE: 96.8 F | RESPIRATION RATE: 18 BRPM | OXYGEN SATURATION: 98 % | DIASTOLIC BLOOD PRESSURE: 119 MMHG | SYSTOLIC BLOOD PRESSURE: 155 MMHG

## 2023-03-19 DIAGNOSIS — T81.49XA POSTOPERATIVE WOUND INFECTION: ICD-10-CM

## 2023-03-19 DIAGNOSIS — S82.892H TYPE I OR II OPEN FRACTURE OF LEFT ANKLE WITH DELAYED HEALING, SUBSEQUENT ENCOUNTER: ICD-10-CM

## 2023-03-19 LAB
ALBUMIN SERPL BCP-MCNC: 3.8 G/DL (ref 3.2–4.9)
ALBUMIN/GLOB SERPL: 1.2 G/DL
ALP SERPL-CCNC: 71 U/L (ref 30–99)
ALT SERPL-CCNC: 17 U/L (ref 2–50)
ANION GAP SERPL CALC-SCNC: 14 MMOL/L (ref 7–16)
AST SERPL-CCNC: 14 U/L (ref 12–45)
BACTERIA SPEC ANAEROBE CULT: ABNORMAL
BILIRUB SERPL-MCNC: 0.3 MG/DL (ref 0.1–1.5)
BUN SERPL-MCNC: 30 MG/DL (ref 8–22)
CALCIUM ALBUM COR SERPL-MCNC: 9.3 MG/DL (ref 8.5–10.5)
CALCIUM SERPL-MCNC: 9.1 MG/DL (ref 8.5–10.5)
CHLORIDE SERPL-SCNC: 107 MMOL/L (ref 96–112)
CO2 SERPL-SCNC: 20 MMOL/L (ref 20–33)
CREAT SERPL-MCNC: 1.07 MG/DL (ref 0.5–1.4)
GFR SERPLBLD CREATININE-BSD FMLA CKD-EPI: 86 ML/MIN/1.73 M 2
GLOBULIN SER CALC-MCNC: 3.2 G/DL (ref 1.9–3.5)
GLUCOSE SERPL-MCNC: 126 MG/DL (ref 65–99)
POTASSIUM SERPL-SCNC: 4.1 MMOL/L (ref 3.6–5.5)
PROT SERPL-MCNC: 7 G/DL (ref 6–8.2)
SIGNIFICANT IND 70042: ABNORMAL
SIGNIFICANT IND 70042: ABNORMAL
SITE SITE: ABNORMAL
SITE SITE: ABNORMAL
SODIUM SERPL-SCNC: 141 MMOL/L (ref 135–145)
SOURCE SOURCE: ABNORMAL
SOURCE SOURCE: ABNORMAL
TROPONIN T SERPL-MCNC: 14 NG/L (ref 6–19)

## 2023-03-19 PROCEDURE — 700111 HCHG RX REV CODE 636 W/ 250 OVERRIDE (IP): Performed by: INTERNAL MEDICINE

## 2023-03-19 PROCEDURE — 96368 THER/DIAG CONCURRENT INF: CPT

## 2023-03-19 PROCEDURE — 700105 HCHG RX REV CODE 258: Performed by: INTERNAL MEDICINE

## 2023-03-19 PROCEDURE — 96365 THER/PROPH/DIAG IV INF INIT: CPT

## 2023-03-19 RX ORDER — 0.9 % SODIUM CHLORIDE 0.9 %
10 VIAL (ML) INJECTION PRN
Status: CANCELLED | OUTPATIENT
Start: 2023-03-20

## 2023-03-19 RX ORDER — 0.9 % SODIUM CHLORIDE 0.9 %
3 VIAL (ML) INJECTION PRN
Status: CANCELLED | OUTPATIENT
Start: 2023-03-20

## 2023-03-19 RX ORDER — HEPARIN SODIUM (PORCINE) LOCK FLUSH IV SOLN 100 UNIT/ML 100 UNIT/ML
500 SOLUTION INTRAVENOUS PRN
Status: CANCELLED | OUTPATIENT
Start: 2023-03-20

## 2023-03-19 RX ORDER — 0.9 % SODIUM CHLORIDE 0.9 %
VIAL (ML) INJECTION PRN
Status: CANCELLED | OUTPATIENT
Start: 2023-03-20

## 2023-03-19 RX ADMIN — DAPTOMYCIN 1000 MG: 500 INJECTION, POWDER, LYOPHILIZED, FOR SOLUTION INTRAVENOUS at 17:39

## 2023-03-19 RX ADMIN — CEFTRIAXONE SODIUM 2 G: 2 INJECTION, POWDER, FOR SOLUTION INTRAMUSCULAR; INTRAVENOUS at 17:33

## 2023-03-19 ASSESSMENT — FIBROSIS 4 INDEX: FIB4 SCORE: 0.72

## 2023-03-19 NOTE — ED PROVIDER NOTES
"ED Provider Note    Scribed for Donny Koo by Terrance Heard. 3/18/2023  11:16 PM    Primary care provider: Juan Davalos M.D.  Means of arrival: Walk-In  History obtained from: Patient  History limited by: None    CHIEF COMPLAINT  Chief Complaint   Patient presents with    Hypertension     Pt with pressure to head and dizziness that came on suddenly, felt like he was going to pass out. Pt took BP at home and it was reading high    Chest Pressure     Sudden onset of chest pressure    Shortness of Breath     EXTERNAL RECORDS REVIEWED  Patient had a recent hospital admission for six days due to postoperative wound infection from 3/12-18 this month. Patient had an ankle surgery and presented with infection of the left ankle, the surgical sight     HPI/ROS  LIMITATION TO HISTORY   Select: None  OUTSIDE HISTORIAN(S):  None    HPI  Gage Garcia is a 47 y.o. male who presents to the Emergency Department lightheadedness onset this afternoon. He reports that he was discharged from the hospital only yesterday. He was admitted for an infection of his left ankle and is currently on antibiotics for it. He was kept in the hospital for high blood pressure but left to return to work. He had blood pressure medication reliant on blood pressure values and when he check his blood pressure it was out of the range for the at-home monitor so only took the \"as needed\" medications. He reports that he developed a numbness in his right foot which prompted him to stand up and walk around but that is when the episode of lightheadedness struck. He called EMS and returned to the hospital. He denies any nausea, vomiting, diarrhea, chest pain or pressure. He endorses a history of hypertension for over a year    REVIEW OF SYSTEMS  As above, all other systems reviewed and are negative.   See HPI for further details.     PAST MEDICAL HISTORY   has a past medical history of Hypertension, Obesity, Smoker, and Strep pharyngitis.    SURGICAL " HISTORY   has a past surgical history that includes orif, ankle (Left, 2/4/2023) and irrigation & debridement general (Left, 3/14/2023).    SOCIAL HISTORY  Social History     Tobacco Use    Smoking status: Former     Packs/day: 1.50     Years: 22.00     Pack years: 33.00     Types: Cigarettes     Start date: 3/11/2023    Smokeless tobacco: Never   Vaping Use    Vaping Use: Never used   Substance Use Topics    Alcohol use: Not Currently     Comment: occ    Drug use: Not Currently     Types: Inhaled, Marijuana     Comment: thc none since 3/11/23      Social History     Substance and Sexual Activity   Drug Use Not Currently    Types: Inhaled, Marijuana    Comment: thc none since 3/11/23     FAMILY HISTORY  Family History   Problem Relation Age of Onset    Heart Disease Mother     Diabetes Mother     Ovarian Cancer Neg Hx     Tubal Cancer Neg Hx     Breast Cancer Neg Hx     Colorectal Cancer Neg Hx     Peritoneal Cancer Neg Hx      CURRENT MEDICATIONS  Home Medications       Reviewed by Vibha Carranza R.N. (Registered Nurse) on 03/18/23 at 2253  Med List Status: Not Addressed     Medication Last Dose Status   acetaminophen (TYLENOL) 500 MG Tab  Active   amLODIPine (NORVASC) 10 MG Tab  Active   atorvastatin (LIPITOR) 10 MG Tab  Active   carvedilol (COREG) 25 MG Tab  Active   cloNIDine (CATAPRES) 0.1 MG Tab  Active   furosemide (LASIX) 20 MG Tab  Active   hydrALAZINE (APRESOLINE) 100 MG tablet  Active   isosorbide mononitrate SR (IMDUR) 60 MG TABLET SR 24 HR  Active   losartan (COZAAR) 100 MG Tab  Active   oxyCODONE immediate-release (ROXICODONE) 5 MG Tab  Active   polyethylene glycol/lytes (MIRALAX) 17 g Pack  Active   senna-docusate (PERICOLACE OR SENOKOT S) 8.6-50 MG Tab  Active   spironolactone (ALDACTONE) 50 MG Tab  Active                  ALLERGIES  No Known Allergies    PHYSICAL EXAM    VITAL SIGNS:   Vitals:    03/18/23 2248 03/18/23 2252 03/19/23 0021 03/19/23 0025   BP:   (!) 157/90 (!) 155/82   Pulse:   89 90  "  Resp:   (!) 24 17   Temp:       TempSrc:       SpO2:   94% 92%   Weight: (!) 170 kg (375 lb) (!) 170 kg (375 lb)     Height: 2.083 m (6' 10\")        Vitals: My interpretation: hypertensive, tachycardic, afebrile, not hypoxic    Reinterpretation of vitals: Improved blood pressure, not tachycardic, not hypoxic    Cardiac Monitor Interpretation: The cardiac monitor revealed normal Sinus Rhythm as interpreted by me. The cardiac monitor was ordered secondary to the patient's history of hypertension and to monitor for dysrhythmia and/or tachycardia.    PE:   Constitutional: Well developed, Well nourished, No acute distress, Non-toxic appearance.   HENT: Normocephalic, Atraumatic, Bilateral external ears normal, Oropharynx is clear mucous membranes are moist. No oral exudates or nasal discharge.   Eyes: Pupils are equal round and reactive, EOMI, Conjunctiva normal, No discharge.   Neck: Normal range of motion, No tenderness, Supple, No stridor. No meningismus.  Lymphatic: No lymphadenopathy noted.   Cardiovascular: Regular rate and rhythm without murmur rub or gallop.  Thorax & Lungs: Clear breath sounds bilaterally without wheezes, rhonchi or rales. There is no chest wall tenderness.   Abdomen: Soft non-tender non-distended. There is no rebound or guarding. No organomegaly is appreciated. Bowel sounds are normal.  Skin: Normal without rash.   Back: No CVA or spinal tenderness.   Extremities: Intact distal pulses, No edema, No tenderness, No cyanosis, No clubbing. Capillary refill is less than 2 seconds.  Musculoskeletal: Good range of motion in all major joints. No tenderness to palpation or major deformities noted.   Neurologic: Alert & oriented x 3, Normal motor function, Normal sensory function, No focal deficits noted. Reflexes are normal.    DIAGNOSTIC STUDIES / PROCEDURES    LABS  Results for orders placed or performed during the hospital encounter of 03/18/23   CBC with Differential   Result Value Ref Range    " WBC 9.5 4.8 - 10.8 K/uL    RBC 5.47 4.70 - 6.10 M/uL    Hemoglobin 14.8 14.0 - 18.0 g/dL    Hematocrit 44.3 42.0 - 52.0 %    MCV 81.0 (L) 81.4 - 97.8 fL    MCH 27.1 27.0 - 33.0 pg    MCHC 33.4 (L) 33.7 - 35.3 g/dL    RDW 41.3 35.9 - 50.0 fL    Platelet Count 222 164 - 446 K/uL    MPV 9.9 9.0 - 12.9 fL    Neutrophils-Polys 67.60 44.00 - 72.00 %    Lymphocytes 18.90 (L) 22.00 - 41.00 %    Monocytes 9.60 0.00 - 13.40 %    Eosinophils 2.80 0.00 - 6.90 %    Basophils 0.60 0.00 - 1.80 %    Immature Granulocytes 0.50 0.00 - 0.90 %    Nucleated RBC 0.00 /100 WBC    Neutrophils (Absolute) 6.38 1.82 - 7.42 K/uL    Lymphs (Absolute) 1.79 1.00 - 4.80 K/uL    Monos (Absolute) 0.91 (H) 0.00 - 0.85 K/uL    Eos (Absolute) 0.26 0.00 - 0.51 K/uL    Baso (Absolute) 0.06 0.00 - 0.12 K/uL    Immature Granulocytes (abs) 0.05 0.00 - 0.11 K/uL    NRBC (Absolute) 0.00 K/uL   Complete Metabolic Panel (CMP)   Result Value Ref Range    Sodium 141 135 - 145 mmol/L    Potassium 4.1 3.6 - 5.5 mmol/L    Chloride 107 96 - 112 mmol/L    Co2 20 20 - 33 mmol/L    Anion Gap 14.0 7.0 - 16.0    Glucose 126 (H) 65 - 99 mg/dL    Bun 30 (H) 8 - 22 mg/dL    Creatinine 1.07 0.50 - 1.40 mg/dL    Calcium 9.1 8.5 - 10.5 mg/dL    AST(SGOT) 14 12 - 45 U/L    ALT(SGPT) 17 2 - 50 U/L    Alkaline Phosphatase 71 30 - 99 U/L    Total Bilirubin 0.3 0.1 - 1.5 mg/dL    Albumin 3.8 3.2 - 4.9 g/dL    Total Protein 7.0 6.0 - 8.2 g/dL    Globulin 3.2 1.9 - 3.5 g/dL    A-G Ratio 1.2 g/dL   Troponins NOW   Result Value Ref Range    Troponin T 14 6 - 19 ng/L   CORRECTED CALCIUM   Result Value Ref Range    Correct Calcium 9.3 8.5 - 10.5 mg/dL   ESTIMATED GFR   Result Value Ref Range    GFR (CKD-EPI) 86 >60 mL/min/1.73 m 2   EKG   Result Value Ref Range    Report       [facility] Emergency Dept.    Test Date:  2023-03-18  Pt Name:    GEETHA BLANCO                  Department: ER  MRN:        [id number]                      Room:  Gender:     Male                          Technician: [id number]  :        1975                   Requested By:ER TRIAGE PROTOCOL  Order #:    [id number]                    Reading MD: Donny Koo    Measurements  Intervals                                Axis  Rate:       101                          P:          59  NH:         191                          QRS:        8  QRSD:       101                          T:          82  QT:         366  QTc:        475    Interpretive Statements  Sinus tachycardia  Probable left atrial enlargement  ST elev, probable normal early repol pattern  Compared to ECG 2023 06:18:29  ST (T wave) deviation now present  Electronically Signed On 3- 23:49:00 PDT by Donny Koo     All labs reviewed by me. Labs were compared to prior labs if they were available. Significant for no leukocytosis, no anemia, normal electrolytes, normal renal function, normal liver enzymes, normal bilirubin, negative troponin    RADIOLOGY  I have independently interpreted the diagnostic imaging associated with this visit and am waiting the final reading from the radiologist.   My preliminary interpretation is a follows: DX-Chest shows borderline cardiomegaly, no obvious focal infiltrates, no bony abnormalities  Radiologist interpretation is as follows:  DX-CHEST-PORTABLE (1 VIEW)   Final Result      1.  No acute cardiac or pulmonary abnormalities are identified.        COURSE & MEDICAL DECISION MAKING  Nursing notes, VS, PMSFHx, labs, imaging, EKG reviewed in chart.    Heart Score: Low    Ddx: Hypertensive emergency, hypertensive disease, MI, PE, NSTEMI    MDM: 11:16 PM Gage Garcia is a 47 y.o. male who presented with episode this evening of hypertension and feeling lightheaded as if he may pass out.  Patient is a longstanding history of hypertensive disease, was just recently admitted for wound infection complicated by hypertension.  He was discharged yesterday morning.  This evening he was supposed take his blood  pressure medications but his blood pressure cuff was simply saying his blood pressure was elevated he did not know which ones to take.  He had episode where he felt lightheaded like he might pass out and came to the emergency [place].  He adamantly denies any chest pain, chest pressure, shortness of breath on my evaluation.  He does have hypertension but otherwise unremarkable vital signs.  His EKG is without ischemic change, troponin negative, chest x-ray unremarkable on my independent interpretation.  He underwent laboratory testing with CBC, CMP, troponin which were all at baseline and negative.  We discussed getting a second troponin but this time patient would like to be discharged and follow-up with his PCP as he has to work in the morning.  Discharged with strict return precautions and close outpatient follow-up patient verbalized understanding plan.    ADDITIONAL PROBLEM LIST AND DISPOSITION    I have discussed management of the patient with the following physicians and WILL's: None    Discussion of management with other QHP or appropriate source(s): None     Escalation of care considered, and ultimately not performed:after discussion with the patient / family, they have elected to decline an escalation in care    Decision tools and prescription drugs considered including, but not limited to: HEART Score low .    FINAL IMPRESSION  1. Hypertension, unspecified type Acute   2. Near syncope Acute      Terrance STERN (Golden), am scribing for, and in the presence of, Donny Koo.    Electronically signed by: Terrance Heard (Golden), 3/18/2023    Donny STERN personally performed the services described in this documentation, as scribed by Terrance Heard in my presence, and it is both accurate and complete.    The note accurately reflects work and decisions made by me.  Donny Koo  3/19/2023  12:47 AM

## 2023-03-19 NOTE — ED TRIAGE NOTES
Chief Complaint   Patient presents with    Hypertension     Pt with pressure to head and dizziness that came on suddenly, felt like he was going to pass out. Pt took BP at home and it was reading high    Chest Pressure     Sudden onset of chest pressure    Shortness of Breath        Pt ambulatory to triage for above complaint.       Pt is alert/oriented and follows commands. Pt speaking in full sentences and responds appropriately to questions. No acute distress noted in triage and respirations are even and unlabored.      Pt placed in lobby and educated on triage process. Pt encouraged to alert staff for any changes in condition

## 2023-03-19 NOTE — DISCHARGE INSTRUCTIONS
I want you to continue taking your blood pressure medication as directed.  I want you to follow-up closely with your PCP for further evaluation and treatment.  If you have any concerns or worsening symptoms, please return to the emergency department for further evaluation and treatment.  Thank you for coming in today.

## 2023-03-19 NOTE — PROGRESS NOTES
Discussed medications and discharge instructions, as well as following up appointments and PICC care. Provided pt with stroke information and emphasized BP control. Pt verbalized understanding.

## 2023-03-19 NOTE — ED NOTES
Discharge home. Discharge summary provided to patient and verbalized understanding.   Patient vitally stable upon discharge.  Discharge instructions provided on take home meds and follow up.  IV line removed and no sign of phlebitis seen.  Patient ambulating steady.

## 2023-03-20 ENCOUNTER — HOSPITAL ENCOUNTER (EMERGENCY)
Facility: MEDICAL CENTER | Age: 48
End: 2023-03-20
Payer: COMMERCIAL

## 2023-03-20 ENCOUNTER — OUTPATIENT INFUSION SERVICES (OUTPATIENT)
Dept: ONCOLOGY | Facility: MEDICAL CENTER | Age: 48
End: 2023-03-20
Attending: INTERNAL MEDICINE
Payer: COMMERCIAL

## 2023-03-20 VITALS
BODY MASS INDEX: 36.45 KG/M2 | DIASTOLIC BLOOD PRESSURE: 89 MMHG | OXYGEN SATURATION: 95 % | SYSTOLIC BLOOD PRESSURE: 142 MMHG | RESPIRATION RATE: 18 BRPM | HEIGHT: 78 IN | WEIGHT: 315 LBS | TEMPERATURE: 98.1 F | HEART RATE: 94 BPM

## 2023-03-20 VITALS
HEART RATE: 97 BPM | RESPIRATION RATE: 20 BRPM | SYSTOLIC BLOOD PRESSURE: 147 MMHG | OXYGEN SATURATION: 97 % | TEMPERATURE: 96.6 F | DIASTOLIC BLOOD PRESSURE: 98 MMHG

## 2023-03-20 DIAGNOSIS — T81.49XA POSTOPERATIVE WOUND INFECTION: ICD-10-CM

## 2023-03-20 DIAGNOSIS — S82.892H TYPE I OR II OPEN FRACTURE OF LEFT ANKLE WITH DELAYED HEALING, SUBSEQUENT ENCOUNTER: ICD-10-CM

## 2023-03-20 LAB
ALBUMIN SERPL BCP-MCNC: 4 G/DL (ref 3.2–4.9)
ALBUMIN/GLOB SERPL: 1.1 G/DL
ALP SERPL-CCNC: 64 U/L (ref 30–99)
ALT SERPL-CCNC: 20 U/L (ref 2–50)
ANION GAP SERPL CALC-SCNC: 13 MMOL/L (ref 7–16)
AST SERPL-CCNC: 21 U/L (ref 12–45)
BASOPHILS # BLD AUTO: 0.5 % (ref 0–1.8)
BASOPHILS # BLD: 0.05 K/UL (ref 0–0.12)
BILIRUB SERPL-MCNC: 0.5 MG/DL (ref 0.1–1.5)
BUN SERPL-MCNC: 32 MG/DL (ref 8–22)
CALCIUM ALBUM COR SERPL-MCNC: 9.4 MG/DL (ref 8.5–10.5)
CALCIUM SERPL-MCNC: 9.4 MG/DL (ref 8.5–10.5)
CHLORIDE SERPL-SCNC: 102 MMOL/L (ref 96–112)
CK SERPL-CCNC: 448 U/L (ref 0–154)
CO2 SERPL-SCNC: 22 MMOL/L (ref 20–33)
CREAT SERPL-MCNC: 1.28 MG/DL (ref 0.5–1.4)
CRP SERPL HS-MCNC: 1.28 MG/DL (ref 0–0.75)
EOSINOPHIL # BLD AUTO: 0.23 K/UL (ref 0–0.51)
EOSINOPHIL NFR BLD: 2.3 % (ref 0–6.9)
ERYTHROCYTE [DISTWIDTH] IN BLOOD BY AUTOMATED COUNT: 42.4 FL (ref 35.9–50)
ERYTHROCYTE [SEDIMENTATION RATE] IN BLOOD BY WESTERGREN METHOD: 2 MM/HOUR (ref 0–20)
GFR SERPLBLD CREATININE-BSD FMLA CKD-EPI: 69 ML/MIN/1.73 M 2
GLOBULIN SER CALC-MCNC: 3.6 G/DL (ref 1.9–3.5)
GLUCOSE SERPL-MCNC: 102 MG/DL (ref 65–99)
HCT VFR BLD AUTO: 45.9 % (ref 42–52)
HGB BLD-MCNC: 15.4 G/DL (ref 14–18)
IMM GRANULOCYTES # BLD AUTO: 0.05 K/UL (ref 0–0.11)
IMM GRANULOCYTES NFR BLD AUTO: 0.5 % (ref 0–0.9)
LYMPHOCYTES # BLD AUTO: 2.27 K/UL (ref 1–4.8)
LYMPHOCYTES NFR BLD: 22.2 % (ref 22–41)
MCH RBC QN AUTO: 27.1 PG (ref 27–33)
MCHC RBC AUTO-ENTMCNC: 33.6 G/DL (ref 33.7–35.3)
MCV RBC AUTO: 80.8 FL (ref 81.4–97.8)
MONOCYTES # BLD AUTO: 0.96 K/UL (ref 0–0.85)
MONOCYTES NFR BLD AUTO: 9.4 % (ref 0–13.4)
NEUTROPHILS # BLD AUTO: 6.66 K/UL (ref 1.82–7.42)
NEUTROPHILS NFR BLD: 65.1 % (ref 44–72)
NRBC # BLD AUTO: 0 K/UL
NRBC BLD-RTO: 0 /100 WBC
OUTPT INFUS CBC COMMENT OICOM: ABNORMAL
PLATELET # BLD AUTO: 241 K/UL (ref 164–446)
PMV BLD AUTO: 9.8 FL (ref 9–12.9)
POTASSIUM SERPL-SCNC: 3.7 MMOL/L (ref 3.6–5.5)
PROT SERPL-MCNC: 7.6 G/DL (ref 6–8.2)
RBC # BLD AUTO: 5.68 M/UL (ref 4.7–6.1)
SODIUM SERPL-SCNC: 137 MMOL/L (ref 135–145)
WBC # BLD AUTO: 10.2 K/UL (ref 4.8–10.8)

## 2023-03-20 PROCEDURE — 82550 ASSAY OF CK (CPK): CPT

## 2023-03-20 PROCEDURE — 86140 C-REACTIVE PROTEIN: CPT

## 2023-03-20 PROCEDURE — 85025 COMPLETE CBC W/AUTO DIFF WBC: CPT

## 2023-03-20 PROCEDURE — 700111 HCHG RX REV CODE 636 W/ 250 OVERRIDE (IP): Performed by: INTERNAL MEDICINE

## 2023-03-20 PROCEDURE — 96365 THER/PROPH/DIAG IV INF INIT: CPT

## 2023-03-20 PROCEDURE — 700105 HCHG RX REV CODE 258: Performed by: INTERNAL MEDICINE

## 2023-03-20 PROCEDURE — 80053 COMPREHEN METABOLIC PANEL: CPT

## 2023-03-20 PROCEDURE — 302449 STATCHG TRIAGE ONLY (STATISTIC)

## 2023-03-20 PROCEDURE — 85652 RBC SED RATE AUTOMATED: CPT

## 2023-03-20 RX ORDER — 0.9 % SODIUM CHLORIDE 0.9 %
3 VIAL (ML) INJECTION PRN
Status: CANCELLED | OUTPATIENT
Start: 2023-03-21

## 2023-03-20 RX ORDER — 0.9 % SODIUM CHLORIDE 0.9 %
10 VIAL (ML) INJECTION PRN
Status: CANCELLED | OUTPATIENT
Start: 2023-03-21

## 2023-03-20 RX ORDER — 0.9 % SODIUM CHLORIDE 0.9 %
VIAL (ML) INJECTION PRN
Status: CANCELLED | OUTPATIENT
Start: 2023-03-21

## 2023-03-20 RX ORDER — HEPARIN SODIUM (PORCINE) LOCK FLUSH IV SOLN 100 UNIT/ML 100 UNIT/ML
500 SOLUTION INTRAVENOUS PRN
Status: CANCELLED | OUTPATIENT
Start: 2023-03-21

## 2023-03-20 RX ADMIN — DAPTOMYCIN 1000 MG: 500 INJECTION, POWDER, LYOPHILIZED, FOR SOLUTION INTRAVENOUS at 14:16

## 2023-03-20 RX ADMIN — CEFTRIAXONE SODIUM 2 G: 2 INJECTION, POWDER, FOR SOLUTION INTRAMUSCULAR; INTRAVENOUS at 14:16

## 2023-03-20 ASSESSMENT — FIBROSIS 4 INDEX: FIB4 SCORE: 0.72

## 2023-03-20 NOTE — PROGRESS NOTES
Pt presents to [facility] for daily ceftriaxone and daptomycin. R PICC line in place; brisk blood return observed and pt tolerated well. Labs drawn per orders. Ceftriaxone and daptomycin infused with no s/s of adverse reactions. Brisk blood return observed from PICC line before flushed and wrapped in gauze for tomorrow. Next appointment confirmed and education provided. Pt discharged to self care with all personal belongings and in NAD.

## 2023-03-20 NOTE — PROGRESS NOTES
Pt arrived late for first ABT.  Called Pt and he stated he overslept but that he was on his way.  Told to Pt to still come in for his therapy.  Pt arrived ambulatory with no assistance. Pt with left upper arm PICC single lumen.  PICC flushed with brisk blood return.  Dapto and Rocephin infused at the same time as they are compatible.  Pt with no adverse effects to treatment. Pt oriented to the infusion center.  Pt states other than the infection in his ankle he has no other acute infections.  Pt does state he has been nauseous.  Educated him to call his MD and see if they will give him anti-nausea medication.  PICC line flushed; green cap applied; wrapped in gauze and protective sleeve placed.  Pt left in no apparent distress.  Pt will be calling the Scheduling team to see if he can get earlier appointments as he has to be at work at 3pm each day. Told him I will email them giving them a heads up on his needs.

## 2023-03-20 NOTE — ED NOTES
Pt to desk requesting to leave. Tech explained the risks of leaving w/o being triaged or being seen by a provider. Patient understood. AMA/LWBS paperwork completed.

## 2023-03-21 ENCOUNTER — OUTPATIENT INFUSION SERVICES (OUTPATIENT)
Dept: ONCOLOGY | Facility: MEDICAL CENTER | Age: 48
End: 2023-03-21
Attending: INTERNAL MEDICINE
Payer: COMMERCIAL

## 2023-03-21 VITALS
TEMPERATURE: 97.8 F | RESPIRATION RATE: 18 BRPM | HEART RATE: 97 BPM | HEIGHT: 78 IN | SYSTOLIC BLOOD PRESSURE: 139 MMHG | DIASTOLIC BLOOD PRESSURE: 103 MMHG | WEIGHT: 315 LBS | BODY MASS INDEX: 36.45 KG/M2 | OXYGEN SATURATION: 97 %

## 2023-03-21 DIAGNOSIS — T81.49XA POSTOPERATIVE WOUND INFECTION: ICD-10-CM

## 2023-03-21 DIAGNOSIS — S82.892H TYPE I OR II OPEN FRACTURE OF LEFT ANKLE WITH DELAYED HEALING, SUBSEQUENT ENCOUNTER: ICD-10-CM

## 2023-03-21 PROCEDURE — 96365 THER/PROPH/DIAG IV INF INIT: CPT

## 2023-03-21 PROCEDURE — 700105 HCHG RX REV CODE 258: Performed by: INTERNAL MEDICINE

## 2023-03-21 PROCEDURE — 96367 TX/PROPH/DG ADDL SEQ IV INF: CPT

## 2023-03-21 PROCEDURE — 96374 THER/PROPH/DIAG INJ IV PUSH: CPT

## 2023-03-21 PROCEDURE — 700111 HCHG RX REV CODE 636 W/ 250 OVERRIDE (IP): Performed by: INTERNAL MEDICINE

## 2023-03-21 RX ORDER — 0.9 % SODIUM CHLORIDE 0.9 %
3 VIAL (ML) INJECTION PRN
Status: CANCELLED | OUTPATIENT
Start: 2023-03-22

## 2023-03-21 RX ORDER — 0.9 % SODIUM CHLORIDE 0.9 %
VIAL (ML) INJECTION PRN
Status: CANCELLED | OUTPATIENT
Start: 2023-03-22

## 2023-03-21 RX ORDER — 0.9 % SODIUM CHLORIDE 0.9 %
10 VIAL (ML) INJECTION PRN
Status: CANCELLED | OUTPATIENT
Start: 2023-03-22

## 2023-03-21 RX ORDER — HEPARIN SODIUM (PORCINE) LOCK FLUSH IV SOLN 100 UNIT/ML 100 UNIT/ML
500 SOLUTION INTRAVENOUS PRN
Status: CANCELLED | OUTPATIENT
Start: 2023-03-22

## 2023-03-21 RX ADMIN — DAPTOMYCIN 1000 MG: 500 INJECTION, POWDER, LYOPHILIZED, FOR SOLUTION INTRAVENOUS at 14:14

## 2023-03-21 RX ADMIN — CEFTRIAXONE SODIUM 2 G: 2 INJECTION, POWDER, FOR SOLUTION INTRAMUSCULAR; INTRAVENOUS at 14:24

## 2023-03-21 ASSESSMENT — FIBROSIS 4 INDEX: FIB4 SCORE: 0.92

## 2023-03-21 NOTE — PROGRESS NOTES
Gage to infusion for daily ceftriaxone and daptomycin. Patient reports tolerating treatment well with no adverse effects. Reports he received a phone call earlier today telling him to stop taking one of his medications while on IV ABX but could not remember which medication it was. RN reviewed with pharmacy and confirmed that patient should not be taking atorvastatin while on daptomycin, educated patient and patient verbalized understanding. PICC flushed with NS with positive blood and connector cap changed. Dapto and Rocephin infused as ordered, patient tolerated well with no adverse effects. PICC flushed post infusion with positive blood. Patient left in stable condition, knows when to return for appt tomorrow.

## 2023-03-22 ENCOUNTER — OUTPATIENT INFUSION SERVICES (OUTPATIENT)
Dept: ONCOLOGY | Facility: MEDICAL CENTER | Age: 48
End: 2023-03-22
Attending: INTERNAL MEDICINE
Payer: COMMERCIAL

## 2023-03-22 VITALS
SYSTOLIC BLOOD PRESSURE: 126 MMHG | OXYGEN SATURATION: 96 % | RESPIRATION RATE: 18 BRPM | DIASTOLIC BLOOD PRESSURE: 85 MMHG | TEMPERATURE: 97.4 F | HEART RATE: 108 BPM

## 2023-03-22 DIAGNOSIS — T81.49XA POSTOPERATIVE WOUND INFECTION: ICD-10-CM

## 2023-03-22 DIAGNOSIS — S82.892H TYPE I OR II OPEN FRACTURE OF LEFT ANKLE WITH DELAYED HEALING, SUBSEQUENT ENCOUNTER: ICD-10-CM

## 2023-03-22 PROCEDURE — 96365 THER/PROPH/DIAG IV INF INIT: CPT

## 2023-03-22 PROCEDURE — 700111 HCHG RX REV CODE 636 W/ 250 OVERRIDE (IP): Performed by: INTERNAL MEDICINE

## 2023-03-22 PROCEDURE — 96368 THER/DIAG CONCURRENT INF: CPT

## 2023-03-22 PROCEDURE — 700105 HCHG RX REV CODE 258: Performed by: INTERNAL MEDICINE

## 2023-03-22 RX ORDER — 0.9 % SODIUM CHLORIDE 0.9 %
VIAL (ML) INJECTION PRN
Status: CANCELLED | OUTPATIENT
Start: 2023-03-23

## 2023-03-22 RX ORDER — 0.9 % SODIUM CHLORIDE 0.9 %
3 VIAL (ML) INJECTION PRN
Status: CANCELLED | OUTPATIENT
Start: 2023-03-23

## 2023-03-22 RX ORDER — 0.9 % SODIUM CHLORIDE 0.9 %
10 VIAL (ML) INJECTION PRN
Status: CANCELLED | OUTPATIENT
Start: 2023-03-23

## 2023-03-22 RX ORDER — HEPARIN SODIUM (PORCINE) LOCK FLUSH IV SOLN 100 UNIT/ML 100 UNIT/ML
500 SOLUTION INTRAVENOUS PRN
Status: CANCELLED | OUTPATIENT
Start: 2023-03-23

## 2023-03-22 RX ADMIN — DAPTOMYCIN 1000 MG: 500 INJECTION, POWDER, LYOPHILIZED, FOR SOLUTION INTRAVENOUS at 14:06

## 2023-03-22 RX ADMIN — CEFTRIAXONE SODIUM 2 G: 2 INJECTION, POWDER, FOR SOLUTION INTRAMUSCULAR; INTRAVENOUS at 14:05

## 2023-03-22 NOTE — PROGRESS NOTES
Gage returns to IS for daily antibiotic therapy.  Gage denies any acute changes over night, reports increased fatigue.  PICC intact to RUE, flushes easily with brisk blood return.  PICC dressing slightly lifting, dressing changed in sterile fashion per protocol.  Daptomycin and Rocephin infused concurrently, Gage tolerated well.  PICC line flushed with NS, + blood return again observed.  PICC wrapped in gauze and protective sleeve.  Gage discharged in NAD, next appointment confirmed.

## 2023-03-23 ENCOUNTER — OFFICE VISIT (OUTPATIENT)
Dept: MEDICAL GROUP | Facility: MEDICAL CENTER | Age: 48
End: 2023-03-23
Payer: COMMERCIAL

## 2023-03-23 ENCOUNTER — OUTPATIENT INFUSION SERVICES (OUTPATIENT)
Dept: ONCOLOGY | Facility: MEDICAL CENTER | Age: 48
End: 2023-03-23
Attending: INTERNAL MEDICINE
Payer: COMMERCIAL

## 2023-03-23 VITALS
SYSTOLIC BLOOD PRESSURE: 138 MMHG | WEIGHT: 315 LBS | BODY MASS INDEX: 36.45 KG/M2 | HEART RATE: 96 BPM | TEMPERATURE: 97.2 F | HEIGHT: 78 IN | OXYGEN SATURATION: 93 % | DIASTOLIC BLOOD PRESSURE: 90 MMHG

## 2023-03-23 VITALS
WEIGHT: 315 LBS | BODY MASS INDEX: 36.45 KG/M2 | OXYGEN SATURATION: 94 % | SYSTOLIC BLOOD PRESSURE: 144 MMHG | HEART RATE: 96 BPM | TEMPERATURE: 97.2 F | DIASTOLIC BLOOD PRESSURE: 93 MMHG | RESPIRATION RATE: 20 BRPM | HEIGHT: 78 IN

## 2023-03-23 DIAGNOSIS — T81.49XA POSTOPERATIVE WOUND INFECTION: ICD-10-CM

## 2023-03-23 DIAGNOSIS — I10 PRIMARY HYPERTENSION: ICD-10-CM

## 2023-03-23 DIAGNOSIS — L85.3 DRY SKIN: ICD-10-CM

## 2023-03-23 DIAGNOSIS — S82.892H TYPE I OR II OPEN FRACTURE OF LEFT ANKLE WITH DELAYED HEALING, SUBSEQUENT ENCOUNTER: ICD-10-CM

## 2023-03-23 DIAGNOSIS — R11.0 NAUSEA: ICD-10-CM

## 2023-03-23 LAB — CK SERPL-CCNC: 222 U/L (ref 0–154)

## 2023-03-23 PROCEDURE — 96365 THER/PROPH/DIAG IV INF INIT: CPT

## 2023-03-23 PROCEDURE — 96368 THER/DIAG CONCURRENT INF: CPT

## 2023-03-23 PROCEDURE — 700111 HCHG RX REV CODE 636 W/ 250 OVERRIDE (IP): Performed by: INTERNAL MEDICINE

## 2023-03-23 PROCEDURE — 82550 ASSAY OF CK (CPK): CPT

## 2023-03-23 PROCEDURE — 700105 HCHG RX REV CODE 258: Performed by: INTERNAL MEDICINE

## 2023-03-23 PROCEDURE — 99214 OFFICE O/P EST MOD 30 MIN: CPT | Performed by: STUDENT IN AN ORGANIZED HEALTH CARE EDUCATION/TRAINING PROGRAM

## 2023-03-23 RX ORDER — 0.9 % SODIUM CHLORIDE 0.9 %
VIAL (ML) INJECTION PRN
Status: CANCELLED | OUTPATIENT
Start: 2023-03-24

## 2023-03-23 RX ORDER — HEPARIN SODIUM (PORCINE) LOCK FLUSH IV SOLN 100 UNIT/ML 100 UNIT/ML
500 SOLUTION INTRAVENOUS PRN
Status: CANCELLED | OUTPATIENT
Start: 2023-03-24

## 2023-03-23 RX ORDER — 0.9 % SODIUM CHLORIDE 0.9 %
10 VIAL (ML) INJECTION PRN
Status: CANCELLED | OUTPATIENT
Start: 2023-03-24

## 2023-03-23 RX ORDER — ONDANSETRON 4 MG/1
4 TABLET, ORALLY DISINTEGRATING ORAL EVERY 6 HOURS PRN
Qty: 30 TABLET | Refills: 2 | Status: SHIPPED | OUTPATIENT
Start: 2023-03-23 | End: 2023-04-22

## 2023-03-23 RX ORDER — 0.9 % SODIUM CHLORIDE 0.9 %
3 VIAL (ML) INJECTION PRN
Status: CANCELLED | OUTPATIENT
Start: 2023-03-24

## 2023-03-23 RX ADMIN — CEFTRIAXONE SODIUM 2 G: 2 INJECTION, POWDER, FOR SOLUTION INTRAMUSCULAR; INTRAVENOUS at 14:13

## 2023-03-23 RX ADMIN — DAPTOMYCIN 1000 MG: 500 INJECTION, POWDER, LYOPHILIZED, FOR SOLUTION INTRAVENOUS at 14:13

## 2023-03-23 ASSESSMENT — ENCOUNTER SYMPTOMS
WHEEZING: 0
PALPITATIONS: 0
CHILLS: 0
FEVER: 0
SHORTNESS OF BREATH: 0

## 2023-03-23 ASSESSMENT — FIBROSIS 4 INDEX
FIB4 SCORE: 0.92
FIB4 SCORE: 0.92

## 2023-03-23 NOTE — PROGRESS NOTES
Mr Garcia is here today for daily antibiotic infusion. He has no new concerns to report.    PICC line to his right upper arm, flushed well with good blood return, labs drawn per orders.    Anitbiotics were administered without adverse effects. Line was flushed after infusion, tolerated well.    Mr Garcia was discharged in stable condition and will return tomorrow.

## 2023-03-23 NOTE — PROGRESS NOTES
Subjective:     CC: post hospital follow up     HPI:   Gage presents today with    47-year-old with past medical history of hypertension, pseudogout, cardiomyopathy with EF of 40, status post ORIF left lateral malleolus and internal fixation of left syndesmosis on 2/4 located by wound infection 3/14/2023  Antibiotics infusion daily    Since last visit patient presented to the ED on 3/12/2023 for wound check.  Initial date of surgery 2/4.  Found to have infected wound underwent I&D on 3/14, culture, Proteus and MRSA.  Patient will be on IV daptomycin and Rocephin EOT 4/25/2023.  CT a renal artery was done with showing patent renal arteries    Follow-up with 3/22/23  WB Status: Discussed compliance with weightbearing in his cam boot.  Recommended offloading with crutches or cane as needed.    He reports he is following with orthopedic weekly  He is receiving daily infusions.   He is experience some nausea    Amlodipine 10mg  Carvedilol 25mg BID  Losartan 100mg  furosemide 20mg bid  spironolactone 25mg  isosorbide monoitrate 60mg daily  hydralazine 100mg TID  Clonidine 0.1 has not required.   Report home -130s/ 80-90s  Quit smoking 16 days ago     Problem   Hypertension    He reports blood pressure issue first noted around 07/2022 he presented to [facility] with shortness of breath and elevated blood pressure.  Subsequent work-up at [facility] showed heart failure reduced ejection fraction LVH LVEF of 30%.  He reported his blood pressure somewhat controlled until 2/2023 when he had an ankle fracture limiting his exercise ability.  He reports he has gained significant amount of weight compared to 6 months ago.    Hx of hypertensive urgency and hx HFrEF 07/2022 LVEF 30%  Echocardiogram 2/2023 shows LVEF 40%, LVH -Deferred sleep study due to financial constraints  -CT abdomen with normal patent renal arteries  - Echocardiogram reported normal aortic root    Current medication:   amlodipine 10mg,   carvedilol 12.5mg  "BID,   Losartan 100mg daily   imdur 60mg daily,   spironolactone 25mg daily   Furosemide 20mg BID  Clonidine 0.1 PRN           Health Maintenance:     ROS:  Review of Systems   Constitutional:  Negative for chills and fever.   Respiratory:  Negative for shortness of breath and wheezing.    Cardiovascular:  Negative for chest pain and palpitations.     Objective:     Exam:  BP (!) 138/90 (BP Location: Left arm)   Pulse 96   Temp 36.2 °C (97.2 °F) (Temporal)   Ht 2.083 m (6' 10\")   Wt (!) 175 kg (386 lb 9.6 oz)   SpO2 93%   BMI 40.42 kg/m²  Body mass index is 40.42 kg/m².    Physical Exam  Constitutional:       Appearance: Normal appearance.   Cardiovascular:      Rate and Rhythm: Normal rate and regular rhythm.   Pulmonary:      Effort: Pulmonary effort is normal.      Breath sounds: Normal breath sounds.   Musculoskeletal:      Cervical back: Normal range of motion and neck supple.   Lymphadenopathy:      Cervical: No cervical adenopathy.   Neurological:      Mental Status: He is alert.           Labs:     Assessment & Plan:     47 y.o. male with the following -     1. Primary hypertension  Chronic, poorly controlled  Patient currently on 6 different medications for blood pressure as noted in HPI taking without adverse effect.  He reports his home blood pressure between 120 and 130s.  Blood pressure today initially at 150s repeat blood pressure 138/90.  - He denies chest pain, shortness of breath, dyspnea on exertion  -Echocardiogram without aortic root changes, CTA abdomen with patent renal arteries  Plan  -Continue current medication  Recommend diet and exercise  Recommended sleep study however patient would like to defer at this time  - Referral to Vascular Medicine    2. Dry skin  Chronic, stable  Patient requested referral to dermatology for dry skin.  He reports general pruritus and dry skin and has hyperpigmentation from abrasion due to scratching.  Discussed conservative management/moisturizer " therapy.  Plan  Patient requests referral to dermatology      3. Postoperative wound infection  Patient presents today for post hospital visit was recently seen in the hospital as noted in HPI  He has weekly follow-up with orthopedic, currently receiving IV daptomycin and ceftriaxone  And will be getting weekly labs CBC CMP CK  Plan  We will monitor    4. Nausea  Chronic, stable  He reports he has associated nausea with antibiotics infusion and required Zofran in the hospital.  Plan  - ondansetron (ZOFRAN ODT) 4 MG TABLET DISPERSIBLE; Take 1 Tablet by mouth every 6 hours as needed for Nausea/Vomiting for up to 30 days.  Dispense: 30 Tablet; Refill: 2                Return in about 3 months (around 6/23/2023) for Hypertension + BP LOG.    Please note that this dictation was created using voice recognition software. I have made every reasonable attempt to correct obvious errors, but I expect that there are errors of grammar and possibly content that I did not discover before finalizing the note.

## 2023-03-24 ENCOUNTER — OUTPATIENT INFUSION SERVICES (OUTPATIENT)
Dept: ONCOLOGY | Facility: MEDICAL CENTER | Age: 48
End: 2023-03-24
Attending: INTERNAL MEDICINE
Payer: COMMERCIAL

## 2023-03-24 ENCOUNTER — TELEPHONE (OUTPATIENT)
Dept: INFECTIOUS DISEASES | Facility: MEDICAL CENTER | Age: 48
End: 2023-03-24
Payer: COMMERCIAL

## 2023-03-24 VITALS
RESPIRATION RATE: 20 BRPM | DIASTOLIC BLOOD PRESSURE: 94 MMHG | TEMPERATURE: 97.5 F | OXYGEN SATURATION: 94 % | SYSTOLIC BLOOD PRESSURE: 152 MMHG | HEART RATE: 94 BPM

## 2023-03-24 DIAGNOSIS — S82.892H TYPE I OR II OPEN FRACTURE OF LEFT ANKLE WITH DELAYED HEALING, SUBSEQUENT ENCOUNTER: ICD-10-CM

## 2023-03-24 DIAGNOSIS — T81.49XA POSTOPERATIVE WOUND INFECTION: ICD-10-CM

## 2023-03-24 PROCEDURE — 96365 THER/PROPH/DIAG IV INF INIT: CPT

## 2023-03-24 PROCEDURE — 700111 HCHG RX REV CODE 636 W/ 250 OVERRIDE (IP): Performed by: INTERNAL MEDICINE

## 2023-03-24 PROCEDURE — 700105 HCHG RX REV CODE 258: Performed by: INTERNAL MEDICINE

## 2023-03-24 PROCEDURE — 96368 THER/DIAG CONCURRENT INF: CPT

## 2023-03-24 RX ORDER — HEPARIN SODIUM (PORCINE) LOCK FLUSH IV SOLN 100 UNIT/ML 100 UNIT/ML
500 SOLUTION INTRAVENOUS PRN
Status: CANCELLED | OUTPATIENT
Start: 2023-03-25

## 2023-03-24 RX ORDER — 0.9 % SODIUM CHLORIDE 0.9 %
10 VIAL (ML) INJECTION PRN
Status: CANCELLED | OUTPATIENT
Start: 2023-03-25

## 2023-03-24 RX ORDER — 0.9 % SODIUM CHLORIDE 0.9 %
3 VIAL (ML) INJECTION PRN
Status: CANCELLED | OUTPATIENT
Start: 2023-03-25

## 2023-03-24 RX ORDER — 0.9 % SODIUM CHLORIDE 0.9 %
VIAL (ML) INJECTION PRN
Status: CANCELLED | OUTPATIENT
Start: 2023-03-25

## 2023-03-24 RX ADMIN — CEFTRIAXONE SODIUM 2 G: 2 INJECTION, POWDER, FOR SOLUTION INTRAMUSCULAR; INTRAVENOUS at 14:11

## 2023-03-24 RX ADMIN — DAPTOMYCIN 1000 MG: 500 INJECTION, POWDER, LYOPHILIZED, FOR SOLUTION INTRAVENOUS at 14:11

## 2023-03-24 NOTE — TELEPHONE ENCOUNTER
Called pt to schedule [facility] appt with APRN for infected ankle after ORIF EOT 4/25/23  No answer no voicemail set up

## 2023-03-24 NOTE — PROGRESS NOTES
Pt returns to [facility] for Rocephin/Daptomycin for left ankle infection.  Pt denies pain today.  RUE PICC flushed with NS and brisk blood return observed.  Antibiotics infused without adverse reaction.  PICC flushed with NS and line secured with sleeve.  Confirmed tomorrow's appt time with pt.  Pt dc home to self care.

## 2023-03-25 ENCOUNTER — OUTPATIENT INFUSION SERVICES (OUTPATIENT)
Dept: ONCOLOGY | Facility: MEDICAL CENTER | Age: 48
End: 2023-03-25
Attending: INTERNAL MEDICINE
Payer: COMMERCIAL

## 2023-03-25 VITALS
DIASTOLIC BLOOD PRESSURE: 81 MMHG | RESPIRATION RATE: 18 BRPM | HEART RATE: 94 BPM | SYSTOLIC BLOOD PRESSURE: 144 MMHG | TEMPERATURE: 97 F | OXYGEN SATURATION: 95 %

## 2023-03-25 DIAGNOSIS — S82.892H TYPE I OR II OPEN FRACTURE OF LEFT ANKLE WITH DELAYED HEALING, SUBSEQUENT ENCOUNTER: ICD-10-CM

## 2023-03-25 DIAGNOSIS — T81.49XA POSTOPERATIVE WOUND INFECTION: ICD-10-CM

## 2023-03-25 PROCEDURE — 96368 THER/DIAG CONCURRENT INF: CPT

## 2023-03-25 PROCEDURE — 700105 HCHG RX REV CODE 258: Performed by: INTERNAL MEDICINE

## 2023-03-25 PROCEDURE — 96365 THER/PROPH/DIAG IV INF INIT: CPT

## 2023-03-25 PROCEDURE — 700111 HCHG RX REV CODE 636 W/ 250 OVERRIDE (IP): Performed by: INTERNAL MEDICINE

## 2023-03-25 RX ORDER — 0.9 % SODIUM CHLORIDE 0.9 %
10 VIAL (ML) INJECTION PRN
Status: CANCELLED | OUTPATIENT
Start: 2023-03-26

## 2023-03-25 RX ORDER — HEPARIN SODIUM (PORCINE) LOCK FLUSH IV SOLN 100 UNIT/ML 100 UNIT/ML
500 SOLUTION INTRAVENOUS PRN
Status: CANCELLED | OUTPATIENT
Start: 2023-03-26

## 2023-03-25 RX ORDER — 0.9 % SODIUM CHLORIDE 0.9 %
VIAL (ML) INJECTION PRN
Status: CANCELLED | OUTPATIENT
Start: 2023-03-26

## 2023-03-25 RX ORDER — 0.9 % SODIUM CHLORIDE 0.9 %
3 VIAL (ML) INJECTION PRN
Status: CANCELLED | OUTPATIENT
Start: 2023-03-26

## 2023-03-25 RX ADMIN — CEFTRIAXONE SODIUM 2 G: 2 INJECTION, POWDER, FOR SOLUTION INTRAMUSCULAR; INTRAVENOUS at 14:04

## 2023-03-25 RX ADMIN — DAPTOMYCIN 1000 MG: 500 INJECTION, POWDER, LYOPHILIZED, FOR SOLUTION INTRAVENOUS at 14:04

## 2023-03-25 NOTE — PROGRESS NOTES
Pt arrived ambulatory to [facility] for daily antibiotic infusion for left ankle post operative infection. POC discussed with pt and he agrees with plan. Pt with call light in reach for safety. Pt verbalized understanding to call for needs/assist.     SHELLY single lumen PICC with brisk blood return. Pt medicated per MAR. Pt tolerated treatment without s/s adverse reaction. PICC with brisk blood return, flushed with NS, wrapped in sleeve. Pt discharged to self care, NAD. Pt to return tomorrow.

## 2023-03-26 ENCOUNTER — OUTPATIENT INFUSION SERVICES (OUTPATIENT)
Dept: ONCOLOGY | Facility: MEDICAL CENTER | Age: 48
End: 2023-03-26
Attending: INTERNAL MEDICINE
Payer: COMMERCIAL

## 2023-03-26 VITALS
OXYGEN SATURATION: 94 % | SYSTOLIC BLOOD PRESSURE: 136 MMHG | TEMPERATURE: 98.2 F | HEART RATE: 84 BPM | DIASTOLIC BLOOD PRESSURE: 88 MMHG | RESPIRATION RATE: 18 BRPM

## 2023-03-26 DIAGNOSIS — S82.892H TYPE I OR II OPEN FRACTURE OF LEFT ANKLE WITH DELAYED HEALING, SUBSEQUENT ENCOUNTER: ICD-10-CM

## 2023-03-26 DIAGNOSIS — T81.49XA POSTOPERATIVE WOUND INFECTION: ICD-10-CM

## 2023-03-26 PROCEDURE — 700105 HCHG RX REV CODE 258: Performed by: INTERNAL MEDICINE

## 2023-03-26 PROCEDURE — 96368 THER/DIAG CONCURRENT INF: CPT

## 2023-03-26 PROCEDURE — 99211 OFF/OP EST MAY X REQ PHY/QHP: CPT

## 2023-03-26 PROCEDURE — 96365 THER/PROPH/DIAG IV INF INIT: CPT

## 2023-03-26 PROCEDURE — 700111 HCHG RX REV CODE 636 W/ 250 OVERRIDE (IP): Performed by: INTERNAL MEDICINE

## 2023-03-26 RX ORDER — 0.9 % SODIUM CHLORIDE 0.9 %
VIAL (ML) INJECTION PRN
Status: CANCELLED | OUTPATIENT
Start: 2023-03-27

## 2023-03-26 RX ORDER — 0.9 % SODIUM CHLORIDE 0.9 %
3 VIAL (ML) INJECTION PRN
Status: CANCELLED | OUTPATIENT
Start: 2023-03-27

## 2023-03-26 RX ORDER — 0.9 % SODIUM CHLORIDE 0.9 %
10 VIAL (ML) INJECTION PRN
Status: CANCELLED | OUTPATIENT
Start: 2023-03-27

## 2023-03-26 RX ORDER — HEPARIN SODIUM (PORCINE) LOCK FLUSH IV SOLN 100 UNIT/ML 100 UNIT/ML
500 SOLUTION INTRAVENOUS PRN
Status: CANCELLED | OUTPATIENT
Start: 2023-03-27

## 2023-03-26 RX ADMIN — DAPTOMYCIN 1000 MG: 500 INJECTION, POWDER, LYOPHILIZED, FOR SOLUTION INTRAVENOUS at 14:07

## 2023-03-26 RX ADMIN — CEFTRIAXONE SODIUM 2 G: 2 INJECTION, POWDER, FOR SOLUTION INTRAMUSCULAR; INTRAVENOUS at 14:15

## 2023-03-26 NOTE — PROGRESS NOTES
Pt arrived ambulatory and stable for daily antibiotic therapy. Denies pain or acte distress, no changes in assessment since yesterday. PICC line dressing changed per protocol, sides lifting up.  Insertion site clean dry and intact    Daptomycin and Rocephin infused concurrently, Gage tolerated well.  PICC line flushed with NS, + blood return again observed.  PICC capped.  Pt left clinic stable and ambulatory

## 2023-03-27 ENCOUNTER — OUTPATIENT INFUSION SERVICES (OUTPATIENT)
Dept: ONCOLOGY | Facility: MEDICAL CENTER | Age: 48
End: 2023-03-27
Attending: INTERNAL MEDICINE
Payer: COMMERCIAL

## 2023-03-27 VITALS
WEIGHT: 315 LBS | OXYGEN SATURATION: 96 % | HEART RATE: 89 BPM | BODY MASS INDEX: 36.45 KG/M2 | RESPIRATION RATE: 18 BRPM | DIASTOLIC BLOOD PRESSURE: 88 MMHG | TEMPERATURE: 97.9 F | SYSTOLIC BLOOD PRESSURE: 127 MMHG | HEIGHT: 78 IN

## 2023-03-27 DIAGNOSIS — T81.49XA POSTOPERATIVE WOUND INFECTION: ICD-10-CM

## 2023-03-27 DIAGNOSIS — S82.892H TYPE I OR II OPEN FRACTURE OF LEFT ANKLE WITH DELAYED HEALING, SUBSEQUENT ENCOUNTER: ICD-10-CM

## 2023-03-27 LAB
ALBUMIN SERPL BCP-MCNC: 3.9 G/DL (ref 3.2–4.9)
ALBUMIN/GLOB SERPL: 1.1 G/DL
ALP SERPL-CCNC: 62 U/L (ref 30–99)
ALT SERPL-CCNC: 24 U/L (ref 2–50)
ANION GAP SERPL CALC-SCNC: 10 MMOL/L (ref 7–16)
AST SERPL-CCNC: 17 U/L (ref 12–45)
BASOPHILS # BLD AUTO: 0.7 % (ref 0–1.8)
BASOPHILS # BLD: 0.06 K/UL (ref 0–0.12)
BILIRUB SERPL-MCNC: 0.4 MG/DL (ref 0.1–1.5)
BUN SERPL-MCNC: 17 MG/DL (ref 8–22)
CALCIUM ALBUM COR SERPL-MCNC: 9.1 MG/DL (ref 8.5–10.5)
CALCIUM SERPL-MCNC: 9 MG/DL (ref 8.5–10.5)
CHLORIDE SERPL-SCNC: 107 MMOL/L (ref 96–112)
CK SERPL-CCNC: 332 U/L (ref 0–154)
CO2 SERPL-SCNC: 23 MMOL/L (ref 20–33)
CREAT SERPL-MCNC: 0.97 MG/DL (ref 0.5–1.4)
EOSINOPHIL # BLD AUTO: 0.31 K/UL (ref 0–0.51)
EOSINOPHIL NFR BLD: 3.5 % (ref 0–6.9)
ERYTHROCYTE [DISTWIDTH] IN BLOOD BY AUTOMATED COUNT: 42.8 FL (ref 35.9–50)
GFR SERPLBLD CREATININE-BSD FMLA CKD-EPI: 97 ML/MIN/1.73 M 2
GLOBULIN SER CALC-MCNC: 3.5 G/DL (ref 1.9–3.5)
GLUCOSE SERPL-MCNC: 108 MG/DL (ref 65–99)
HCT VFR BLD AUTO: 46.6 % (ref 42–52)
HGB BLD-MCNC: 15.3 G/DL (ref 14–18)
IMM GRANULOCYTES # BLD AUTO: 0.07 K/UL (ref 0–0.11)
IMM GRANULOCYTES NFR BLD AUTO: 0.8 % (ref 0–0.9)
LYMPHOCYTES # BLD AUTO: 1.75 K/UL (ref 1–4.8)
LYMPHOCYTES NFR BLD: 19.8 % (ref 22–41)
MCH RBC QN AUTO: 27 PG (ref 27–33)
MCHC RBC AUTO-ENTMCNC: 32.8 G/DL (ref 33.7–35.3)
MCV RBC AUTO: 82.3 FL (ref 81.4–97.8)
MONOCYTES # BLD AUTO: 0.67 K/UL (ref 0–0.85)
MONOCYTES NFR BLD AUTO: 7.6 % (ref 0–13.4)
NEUTROPHILS # BLD AUTO: 5.96 K/UL (ref 1.82–7.42)
NEUTROPHILS NFR BLD: 67.6 % (ref 44–72)
NRBC # BLD AUTO: 0 K/UL
NRBC BLD-RTO: 0 /100 WBC
OUTPT INFUS CBC COMMENT OICOM: ABNORMAL
PLATELET # BLD AUTO: 239 K/UL (ref 164–446)
PMV BLD AUTO: 9.6 FL (ref 9–12.9)
POTASSIUM SERPL-SCNC: 4.1 MMOL/L (ref 3.6–5.5)
PROT SERPL-MCNC: 7.4 G/DL (ref 6–8.2)
RBC # BLD AUTO: 5.66 M/UL (ref 4.7–6.1)
SODIUM SERPL-SCNC: 140 MMOL/L (ref 135–145)
WBC # BLD AUTO: 8.8 K/UL (ref 4.8–10.8)

## 2023-03-27 PROCEDURE — 82550 ASSAY OF CK (CPK): CPT

## 2023-03-27 PROCEDURE — 96368 THER/DIAG CONCURRENT INF: CPT

## 2023-03-27 PROCEDURE — 700105 HCHG RX REV CODE 258: Performed by: INTERNAL MEDICINE

## 2023-03-27 PROCEDURE — 85025 COMPLETE CBC W/AUTO DIFF WBC: CPT

## 2023-03-27 PROCEDURE — 80053 COMPREHEN METABOLIC PANEL: CPT

## 2023-03-27 PROCEDURE — 700111 HCHG RX REV CODE 636 W/ 250 OVERRIDE (IP): Performed by: INTERNAL MEDICINE

## 2023-03-27 PROCEDURE — 96365 THER/PROPH/DIAG IV INF INIT: CPT

## 2023-03-27 RX ORDER — 0.9 % SODIUM CHLORIDE 0.9 %
3 VIAL (ML) INJECTION PRN
Status: CANCELLED | OUTPATIENT
Start: 2023-03-28

## 2023-03-27 RX ORDER — 0.9 % SODIUM CHLORIDE 0.9 %
VIAL (ML) INJECTION PRN
Status: CANCELLED | OUTPATIENT
Start: 2023-03-28

## 2023-03-27 RX ORDER — 0.9 % SODIUM CHLORIDE 0.9 %
10 VIAL (ML) INJECTION PRN
Status: CANCELLED | OUTPATIENT
Start: 2023-03-28

## 2023-03-27 RX ORDER — HEPARIN SODIUM (PORCINE) LOCK FLUSH IV SOLN 100 UNIT/ML 100 UNIT/ML
500 SOLUTION INTRAVENOUS PRN
Status: CANCELLED | OUTPATIENT
Start: 2023-03-28

## 2023-03-27 RX ADMIN — CEFTRIAXONE SODIUM 2 G: 2 INJECTION, POWDER, FOR SOLUTION INTRAMUSCULAR; INTRAVENOUS at 14:22

## 2023-03-27 RX ADMIN — DAPTOMYCIN 1000 MG: 500 INJECTION, POWDER, LYOPHILIZED, FOR SOLUTION INTRAVENOUS at 14:22

## 2023-03-27 ASSESSMENT — FIBROSIS 4 INDEX: FIB4 SCORE: 0.92

## 2023-03-27 NOTE — PROGRESS NOTES
[facility] daily ceftriaxone and daptomycin. R PICC line in place; brisk blood return observed and pt tolerated well. Labs drawn per orders. Ceftriaxone and daptomycin infused with no s/s of adverse reactions. Brisk blood return observed from PICC line before flushed and wrapped in gauze for tomorrow. Next appointment confirmed and education provided. Pt discharged to self care with all personal belongings and in NAD.

## 2023-03-28 ENCOUNTER — OUTPATIENT INFUSION SERVICES (OUTPATIENT)
Dept: ONCOLOGY | Facility: MEDICAL CENTER | Age: 48
End: 2023-03-28
Attending: INTERNAL MEDICINE
Payer: COMMERCIAL

## 2023-03-28 ENCOUNTER — TELEPHONE (OUTPATIENT)
Dept: INFECTIOUS DISEASES | Facility: MEDICAL CENTER | Age: 48
End: 2023-03-28
Payer: COMMERCIAL

## 2023-03-28 ENCOUNTER — DOCUMENTATION (OUTPATIENT)
Dept: INFECTIOUS DISEASES | Facility: MEDICAL CENTER | Age: 48
End: 2023-03-28
Payer: COMMERCIAL

## 2023-03-28 VITALS
HEART RATE: 92 BPM | OXYGEN SATURATION: 95 % | SYSTOLIC BLOOD PRESSURE: 136 MMHG | TEMPERATURE: 97.4 F | DIASTOLIC BLOOD PRESSURE: 77 MMHG | RESPIRATION RATE: 18 BRPM

## 2023-03-28 DIAGNOSIS — T81.49XA POSTOPERATIVE WOUND INFECTION: ICD-10-CM

## 2023-03-28 PROCEDURE — 96365 THER/PROPH/DIAG IV INF INIT: CPT

## 2023-03-28 PROCEDURE — 96368 THER/DIAG CONCURRENT INF: CPT

## 2023-03-28 PROCEDURE — 700111 HCHG RX REV CODE 636 W/ 250 OVERRIDE (IP): Performed by: INTERNAL MEDICINE

## 2023-03-28 PROCEDURE — 700105 HCHG RX REV CODE 258: Performed by: INTERNAL MEDICINE

## 2023-03-28 RX ORDER — HEPARIN SODIUM (PORCINE) LOCK FLUSH IV SOLN 100 UNIT/ML 100 UNIT/ML
500 SOLUTION INTRAVENOUS PRN
Status: CANCELLED | OUTPATIENT
Start: 2023-03-29

## 2023-03-28 RX ORDER — 0.9 % SODIUM CHLORIDE 0.9 %
10 VIAL (ML) INJECTION PRN
Status: CANCELLED | OUTPATIENT
Start: 2023-03-28

## 2023-03-28 RX ORDER — 0.9 % SODIUM CHLORIDE 0.9 %
VIAL (ML) INJECTION PRN
Status: CANCELLED | OUTPATIENT
Start: 2023-03-29

## 2023-03-28 RX ORDER — 0.9 % SODIUM CHLORIDE 0.9 %
3 VIAL (ML) INJECTION PRN
Status: CANCELLED | OUTPATIENT
Start: 2023-03-29

## 2023-03-28 RX ORDER — 0.9 % SODIUM CHLORIDE 0.9 %
10 VIAL (ML) INJECTION PRN
Status: CANCELLED | OUTPATIENT
Start: 2023-03-29

## 2023-03-28 RX ORDER — 0.9 % SODIUM CHLORIDE 0.9 %
3 VIAL (ML) INJECTION PRN
Status: CANCELLED | OUTPATIENT
Start: 2023-03-28

## 2023-03-28 RX ORDER — HEPARIN SODIUM (PORCINE) LOCK FLUSH IV SOLN 100 UNIT/ML 100 UNIT/ML
500 SOLUTION INTRAVENOUS PRN
Status: CANCELLED | OUTPATIENT
Start: 2023-03-28

## 2023-03-28 RX ORDER — 0.9 % SODIUM CHLORIDE 0.9 %
VIAL (ML) INJECTION PRN
Status: CANCELLED | OUTPATIENT
Start: 2023-03-28

## 2023-03-28 RX ADMIN — DAPTOMYCIN 1000 MG: 500 INJECTION, POWDER, LYOPHILIZED, FOR SOLUTION INTRAVENOUS at 15:16

## 2023-03-28 RX ADMIN — CEFTRIAXONE SODIUM 2 G: 2 INJECTION, POWDER, FOR SOLUTION INTRAMUSCULAR; INTRAVENOUS at 15:05

## 2023-03-28 NOTE — PROGRESS NOTES
I called and spoke with the patient regarding the elevated CPK.  He continues to deny any new weakness or myalgias.  He does have some generalized fatigue.  I reviewed Dr. Fields's notes and per note the patient's and date of antibiotics is 4/25/2023.  I was notified that the outpatient infusion center only has the patient scheduled through 3/31 for antibiotics so I am unsure why the discrepancy.  I will place orders to extend the antibiotic course through 5/25/2023 which is a 6-week course.  However, given that he has persistently elevated CPK and after review of the final culture results I will change antibiotics from daptomycin and ceftriaxone to continuous infusion Zosyn alone.  I notified the patient of the anticipated change.  The patient needs to continue with his 2:00 daily appointments. Orders placed in [facility].    The patient states he has an appointment with surgery tomorrow and will be seen in ID clinic on 3/31.     Gail Santos MD

## 2023-03-28 NOTE — PROGRESS NOTES
Spoke with pharmacy at the outpatient infusion center and orders placed by pharmacist to transition the patient from daptomycin and ceftriaxone to continuous infusion Zosyn thru 4/25/23.  Attempt will be made to continue his 2:00 appointments and if there is any concern with the patient caring a pump for antibiotics, notify MICHAEL Santos MD

## 2023-03-28 NOTE — TELEPHONE ENCOUNTER
Received call from infusion center reporting critical labs results CPK at 332. Paged Dr Santos-Kidney function is good. I spoke with patient he reports no Muscle cramps, no weakness, no pain. I asked if he can come in to see our APRN today. Patient stated that he works nights he is unable to come in only on fridays because he gets paid then. Patient stated that he will be ending abx this Friday 3/31 however per Dr Fields notes infusions are to continue until 4/25. Patient said he was not aware of this. I scheduled him for follow up this Friday 31st. Patient was very irritated about this and says he is tired of dealing with this.

## 2023-03-29 ENCOUNTER — OUTPATIENT INFUSION SERVICES (OUTPATIENT)
Dept: ONCOLOGY | Facility: MEDICAL CENTER | Age: 48
End: 2023-03-29
Attending: INTERNAL MEDICINE
Payer: COMMERCIAL

## 2023-03-29 VITALS
TEMPERATURE: 97 F | SYSTOLIC BLOOD PRESSURE: 144 MMHG | HEART RATE: 87 BPM | OXYGEN SATURATION: 96 % | DIASTOLIC BLOOD PRESSURE: 91 MMHG | RESPIRATION RATE: 18 BRPM

## 2023-03-29 DIAGNOSIS — T81.49XA POSTOPERATIVE WOUND INFECTION: ICD-10-CM

## 2023-03-29 PROCEDURE — 96368 THER/DIAG CONCURRENT INF: CPT

## 2023-03-29 PROCEDURE — 700105 HCHG RX REV CODE 258: Performed by: INTERNAL MEDICINE

## 2023-03-29 PROCEDURE — 700111 HCHG RX REV CODE 636 W/ 250 OVERRIDE (IP): Performed by: INTERNAL MEDICINE

## 2023-03-29 PROCEDURE — 96365 THER/PROPH/DIAG IV INF INIT: CPT

## 2023-03-29 RX ORDER — 0.9 % SODIUM CHLORIDE 0.9 %
10 VIAL (ML) INJECTION PRN
Status: CANCELLED | OUTPATIENT
Start: 2023-03-30

## 2023-03-29 RX ORDER — 0.9 % SODIUM CHLORIDE 0.9 %
3 VIAL (ML) INJECTION PRN
Status: CANCELLED | OUTPATIENT
Start: 2023-03-30

## 2023-03-29 RX ORDER — 0.9 % SODIUM CHLORIDE 0.9 %
VIAL (ML) INJECTION PRN
Status: CANCELLED | OUTPATIENT
Start: 2023-03-30

## 2023-03-29 RX ORDER — HEPARIN SODIUM (PORCINE) LOCK FLUSH IV SOLN 100 UNIT/ML 100 UNIT/ML
500 SOLUTION INTRAVENOUS PRN
Status: CANCELLED | OUTPATIENT
Start: 2023-03-30

## 2023-03-29 RX ADMIN — CEFTRIAXONE SODIUM 2 G: 2 INJECTION, POWDER, FOR SOLUTION INTRAMUSCULAR; INTRAVENOUS at 14:21

## 2023-03-29 RX ADMIN — DAPTOMYCIN 1000 MG: 500 INJECTION, POWDER, LYOPHILIZED, FOR SOLUTION INTRAVENOUS at 14:21

## 2023-03-29 NOTE — PROGRESS NOTES
returns for IVABX for Postoperative wound infection of L ankle. Rocephin / Daptomycin infused as ordered. Gage tolerated well and without incident. R U arm SL PICC line in good condition and has positive blood return. PICC line flushed with saline, clave and dressing changed per protocol. Gage DC'd home in good condition and in NAD. Returns daily. Appointment confirm for next treatment.

## 2023-03-29 NOTE — PROGRESS NOTES
Gage is here for IV antibiotics. CPK = 332 on 3/27/23. Infectious Disease notified regarding elevated CPK. Orders changed to continuous Zosyn infusion. Updated Gage on the plan of care. Gage states that he is unable to have a continuous pump while working. Infectious Disease notified. Orders changed to continue Dapto and Rocephin instead. Plan is to draw CPK on Thursday 3/30/23. Gage denies muscle weakness or pain at this time. Right single lumen PICC in place. Brisk blood return noted. Dapto and Rocephin given per MAR concurrently. PICC flushed with normal saline. PICC line secured with mesh sleeve. Next appointment scheduled. Additional appointments scheduled to end of treatment date, 4/25/23. Next appointment scheduled. Discharged to self care; no apparent distress noted.

## 2023-03-30 ENCOUNTER — OUTPATIENT INFUSION SERVICES (OUTPATIENT)
Dept: ONCOLOGY | Facility: MEDICAL CENTER | Age: 48
End: 2023-03-30
Attending: INTERNAL MEDICINE
Payer: COMMERCIAL

## 2023-03-30 VITALS
SYSTOLIC BLOOD PRESSURE: 134 MMHG | RESPIRATION RATE: 18 BRPM | DIASTOLIC BLOOD PRESSURE: 79 MMHG | TEMPERATURE: 97.3 F | HEART RATE: 89 BPM | OXYGEN SATURATION: 96 %

## 2023-03-30 DIAGNOSIS — T81.49XA POSTOPERATIVE WOUND INFECTION: ICD-10-CM

## 2023-03-30 LAB — CK SERPL-CCNC: 617 U/L (ref 0–154)

## 2023-03-30 PROCEDURE — 700105 HCHG RX REV CODE 258: Performed by: INTERNAL MEDICINE

## 2023-03-30 PROCEDURE — 700111 HCHG RX REV CODE 636 W/ 250 OVERRIDE (IP): Performed by: INTERNAL MEDICINE

## 2023-03-30 PROCEDURE — 96365 THER/PROPH/DIAG IV INF INIT: CPT

## 2023-03-30 PROCEDURE — 82550 ASSAY OF CK (CPK): CPT

## 2023-03-30 PROCEDURE — 96368 THER/DIAG CONCURRENT INF: CPT

## 2023-03-30 RX ORDER — 0.9 % SODIUM CHLORIDE 0.9 %
10 VIAL (ML) INJECTION PRN
Status: CANCELLED | OUTPATIENT
Start: 2023-03-31

## 2023-03-30 RX ORDER — 0.9 % SODIUM CHLORIDE 0.9 %
3 VIAL (ML) INJECTION PRN
Status: CANCELLED | OUTPATIENT
Start: 2023-03-31

## 2023-03-30 RX ORDER — HEPARIN SODIUM (PORCINE) LOCK FLUSH IV SOLN 100 UNIT/ML 100 UNIT/ML
500 SOLUTION INTRAVENOUS PRN
Status: CANCELLED | OUTPATIENT
Start: 2023-03-31

## 2023-03-30 RX ORDER — 0.9 % SODIUM CHLORIDE 0.9 %
VIAL (ML) INJECTION PRN
Status: CANCELLED | OUTPATIENT
Start: 2023-03-31

## 2023-03-30 RX ADMIN — DAPTOMYCIN 1000 MG: 500 INJECTION, POWDER, LYOPHILIZED, FOR SOLUTION INTRAVENOUS at 14:13

## 2023-03-30 RX ADMIN — CEFTRIAXONE SODIUM 2 G: 2 INJECTION, POWDER, FOR SOLUTION INTRAMUSCULAR; INTRAVENOUS at 14:13

## 2023-03-30 NOTE — PROGRESS NOTES
returns for IVABX for Postoperative wound infection of L ankle. Pt reported he got his staples removed.  Rocephin / Daptomycin infused as ordered. Gage tolerated well and without incident. R U arm SL PICC line in good condition and has positive blood return. PICC line flushed with saline.  Green cap applied; gauze wrapped and protective sleeve applied. Gage DC'd home in good condition and in no apparent distress. Returns daily. Appointment confirm for next treatment.

## 2023-03-30 NOTE — PROGRESS NOTES
Sent VOLT to Dr Santos concerning the Pts CPK results at 617.  MD ordered to ask about s/s each visit and as long as the lab stays below 1000 and no s/s; the treatment can continue.  MD ordered CPK lab twice a week.    Discussed I will put in a nursing communication in the treatment plan to that effect.

## 2023-03-31 ENCOUNTER — OUTPATIENT INFUSION SERVICES (OUTPATIENT)
Dept: ONCOLOGY | Facility: MEDICAL CENTER | Age: 48
End: 2023-03-31
Attending: INTERNAL MEDICINE
Payer: COMMERCIAL

## 2023-03-31 ENCOUNTER — DOCUMENTATION (OUTPATIENT)
Dept: INFECTIOUS DISEASES | Facility: MEDICAL CENTER | Age: 48
End: 2023-03-31
Payer: COMMERCIAL

## 2023-03-31 ENCOUNTER — OFFICE VISIT (OUTPATIENT)
Dept: INFECTIOUS DISEASES | Facility: MEDICAL CENTER | Age: 48
End: 2023-03-31
Attending: NURSE PRACTITIONER
Payer: COMMERCIAL

## 2023-03-31 VITALS
RESPIRATION RATE: 16 BRPM | HEART RATE: 89 BPM | DIASTOLIC BLOOD PRESSURE: 85 MMHG | TEMPERATURE: 96.8 F | SYSTOLIC BLOOD PRESSURE: 120 MMHG | OXYGEN SATURATION: 96 %

## 2023-03-31 VITALS
DIASTOLIC BLOOD PRESSURE: 80 MMHG | HEIGHT: 78 IN | WEIGHT: 315 LBS | SYSTOLIC BLOOD PRESSURE: 130 MMHG | BODY MASS INDEX: 36.45 KG/M2 | OXYGEN SATURATION: 96 % | HEART RATE: 88 BPM | TEMPERATURE: 96 F | RESPIRATION RATE: 16 BRPM

## 2023-03-31 DIAGNOSIS — Z96.662: ICD-10-CM

## 2023-03-31 DIAGNOSIS — T84.59XA: ICD-10-CM

## 2023-03-31 DIAGNOSIS — T81.49XA POSTOPERATIVE WOUND INFECTION: ICD-10-CM

## 2023-03-31 DIAGNOSIS — A49.9 POLYMICROBIAL BACTERIAL INFECTION: ICD-10-CM

## 2023-03-31 PROCEDURE — 96374 THER/PROPH/DIAG INJ IV PUSH: CPT

## 2023-03-31 PROCEDURE — 99211 OFF/OP EST MAY X REQ PHY/QHP: CPT | Performed by: NURSE PRACTITIONER

## 2023-03-31 PROCEDURE — 96365 THER/PROPH/DIAG IV INF INIT: CPT

## 2023-03-31 PROCEDURE — 96368 THER/DIAG CONCURRENT INF: CPT

## 2023-03-31 PROCEDURE — 700105 HCHG RX REV CODE 258: Performed by: INTERNAL MEDICINE

## 2023-03-31 PROCEDURE — 700111 HCHG RX REV CODE 636 W/ 250 OVERRIDE (IP): Performed by: INTERNAL MEDICINE

## 2023-03-31 PROCEDURE — 99214 OFFICE O/P EST MOD 30 MIN: CPT | Performed by: NURSE PRACTITIONER

## 2023-03-31 PROCEDURE — 96375 TX/PRO/DX INJ NEW DRUG ADDON: CPT

## 2023-03-31 RX ORDER — HEPARIN SODIUM (PORCINE) LOCK FLUSH IV SOLN 100 UNIT/ML 100 UNIT/ML
500 SOLUTION INTRAVENOUS PRN
Status: CANCELLED | OUTPATIENT
Start: 2023-04-01

## 2023-03-31 RX ORDER — 0.9 % SODIUM CHLORIDE 0.9 %
3 VIAL (ML) INJECTION PRN
Status: CANCELLED | OUTPATIENT
Start: 2023-04-01

## 2023-03-31 RX ORDER — 0.9 % SODIUM CHLORIDE 0.9 %
10 VIAL (ML) INJECTION PRN
Status: CANCELLED | OUTPATIENT
Start: 2023-04-01

## 2023-03-31 RX ORDER — 0.9 % SODIUM CHLORIDE 0.9 %
VIAL (ML) INJECTION PRN
Status: CANCELLED | OUTPATIENT
Start: 2023-04-01

## 2023-03-31 RX ADMIN — CEFTRIAXONE SODIUM 2 G: 2 INJECTION, POWDER, FOR SOLUTION INTRAMUSCULAR; INTRAVENOUS at 14:13

## 2023-03-31 RX ADMIN — DAPTOMYCIN 1000 MG: 500 INJECTION, POWDER, LYOPHILIZED, FOR SOLUTION INTRAVENOUS at 14:16

## 2023-03-31 ASSESSMENT — ENCOUNTER SYMPTOMS
BRUISES/BLEEDS EASILY: 0
ABDOMINAL PAIN: 0
VOMITING: 0
DIARRHEA: 0
WEIGHT LOSS: 0
TINGLING: 1
BLURRED VISION: 0
HEADACHES: 0
COUGH: 0
MYALGIAS: 0
FOCAL WEAKNESS: 1
DOUBLE VISION: 0
NERVOUS/ANXIOUS: 0
WHEEZING: 0
SPUTUM PRODUCTION: 0
NAUSEA: 0
BACK PAIN: 0
CHILLS: 0
NECK PAIN: 0
FEVER: 0
PALPITATIONS: 0
DIZZINESS: 0
SHORTNESS OF BREATH: 0

## 2023-03-31 ASSESSMENT — FIBROSIS 4 INDEX: FIB4 SCORE: 0.68

## 2023-03-31 NOTE — PROGRESS NOTES
INFECTIOUS  DISEASE  OUTPATIENT CLINIC  NOTE   Subjective   Primary care provider: Juan Davalos M.D..     Reason for Follow Up:   Follow-up for   1. Postoperative wound infection        2. 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 Polymicrobial bacterial infection            HPI: Patient previously seen and treated by ID team as inpatient during hospital admission.   Gage Garcia is a 47 y.o. male admitted 3/12/2023 for drainage and swelling left ankle  47-year-old male with a past medical history significant for chronic systolic congestive heart failure, hypertension, obesity transferred from [facility] facility with a complaint of left ankle pain. S/p ORIF left lateral malleolus and internal fixation left syndesmosis on 2/4/2023 with subsequent I&D on 3/14/2023 by Dr. Persaud.  Post op Wound infection.  OR Cultures + E faecalis (ampicillin Sensitive) , Klebsiella oxytoca , Proteus, Bianca magna.  Plan at discharge was 6 weeks of IV  daptomycin +ceftriaxone end date 4/25/2023.      3/28/23-elevated CPK, attempted to change patient to Zosyn patient unable to manage pump at work.  We will continue with IV daptomycin and ceftriaxone until original end date with frequent monitoring of CPK levels.     03/31/23-CPK remains elevated at 617.  CPK levels to be checked twice weekly. Today Patient reports feeling well. Denies drainage, pungent odor, redness.  He still has some present swelling and mild discomfort in his left ankle when ambulating.  Denies feeling generally ill, fevers/chills, headache, n/v/d.  Tolerating IV antibiotics with no complaints of side effects. Denies bleeding, blistering, burning, coldness, discoloration of the skin, feeling of pressure, hives, infection, inflammation, itching, lumps, numbness, pain, rash, redness, soreness, stinging, swelling, tenderness, tingling or muscle aches.     Current Antimicrobials: daptomycin +ceftriaxone end date 4/25/2023.    Previous Antimicrobials: Unasyn    Other Current Medications:  Home Medications    Medication Sig Taking? Last Dose Authorizing Provider   ondansetron (ZOFRAN ODT) 4 MG TABLET DISPERSIBLE Take 1 Tablet by mouth every 6 hours as needed for Nausea/Vomiting for up to 30 days. Yes Taking Juan Davalos M.D.   amLODIPine (NORVASC) 10 MG Tab Take 1 Tablet by mouth every day for 30 days. Yes Taking Cari Charlton M.D.   atorvastatin (LIPITOR) 10 MG Tab Take 1 Tablet by mouth every evening for 30 days. Yes Taking Cari Charlton M.D.   spironolactone (ALDACTONE) 50 MG Tab Take 1 Tablet by mouth every day. Yes Taking Cari Charlton M.D.   isosorbide mononitrate SR (IMDUR) 60 MG TABLET SR 24 HR Take 1 Tablet by mouth every day. Yes Taking Cari Charlton M.D.   furosemide (LASIX) 20 MG Tab Take 1 Tablet by mouth 2 times a day for 30 days. Yes Taking Cari Charlton M.D.   carvedilol (COREG) 25 MG Tab Take 1 Tablet by mouth 2 times a day with meals. Yes Taking Cari Charlton M.D.   senna-docusate (PERICOLACE OR SENOKOT S) 8.6-50 MG Tab Take 2 Tablets by mouth 2 times a day. Yes Taking Cari Charlton M.D.   polyethylene glycol/lytes (MIRALAX) 17 g Pack Mix and drink 1 Packet by mouth 1 time a day as needed (if sennosides and docusate ineffective after 24 hours). Yes Taking Cari Charlton M.D.   losartan (COZAAR) 100 MG Tab Take 1 Tablet by mouth every evening. Yes Taking Cari Charlton M.D.   hydrALAZINE (APRESOLINE) 100 MG tablet Take 1 Tablet by mouth every 8 hours. Yes Taking Cari Charlton M.D.   cloNIDine (CATAPRES) 0.1 MG Tab Take 1 Tablet by mouth 2 times a day as needed (for sbp> 160) for up to 30 days. Yes Taking Cari Charlton M.D.        PMH:  Past Medical History:   Diagnosis Date    Hypertension     Obesity     Smoker     Strep pharyngitis      Past Surgical History:   Procedure Laterality Date    IRRIGATION & DEBRIDEMENT GENERAL Left 3/14/2023    Procedure: IRRIGATION AND DEBRIDEMENT, LEFT ANKLE;  Surgeon: Jorge  KINGA Persaud M.D.;  Location: SURGERY [facility];  Service: Orthopedics    ORIF, ANKLE Left 2023    Procedure: ORIF, ANKLE;  Surgeon: Jorge Persaud M.D.;  Location: SURGERY [facility];  Service: Orthopedics     Family History   Problem Relation Age of Onset    Heart Disease Mother     Diabetes Mother     Ovarian Cancer Neg Hx     Tubal Cancer Neg Hx     Breast Cancer Neg Hx     Colorectal Cancer Neg Hx     Peritoneal Cancer Neg Hx      Social History     Socioeconomic History    Marital status: Single     Spouse name: Not on file    Number of children: Not on file    Years of education: Not on file    Highest education level: Not on file   Occupational History    Not on file   Tobacco Use    Smoking status: Former     Packs/day: 1.50     Years: 22.00     Pack years: 33.00     Types: Cigarettes     Quit date: 3/8/2023     Years since quittin.0    Smokeless tobacco: Never   Vaping Use    Vaping Use: Never used   Substance and Sexual Activity    Alcohol use: Not Currently     Comment: occ    Drug use: Not Currently     Types: Inhaled, Marijuana     Comment: thc none since 3/11/23    Sexual activity: Yes   Other Topics Concern    Not on file   Social History Narrative    Not on file     Social Determinants of Health     Financial Resource Strain: Not on file   Food Insecurity: Not on file   Transportation Needs: Not on file   Physical Activity: Not on file   Stress: Not on file   Social Connections: Not on file   Intimate Partner Violence: Not on file   Housing Stability: Not on file           Allergies/Intolerances:  No Known Allergies    ROS:   Review of Systems   Constitutional:  Positive for malaise/fatigue. Negative for chills, fever and weight loss.        Weight gain   HENT:  Negative for congestion and hearing loss.    Eyes:  Negative for blurred vision and double vision.   Respiratory:  Negative for cough, sputum production, shortness of breath and wheezing.    Cardiovascular:  Negative for chest  "pain and palpitations.   Gastrointestinal:  Negative for abdominal pain, diarrhea, nausea and vomiting.   Genitourinary:  Negative for dysuria.   Musculoskeletal:  Negative for back pain, joint pain, myalgias and neck pain.   Skin:  Negative for itching and rash.   Neurological:  Positive for tingling (Present since surgery) and focal weakness (Left ankle). Negative for dizziness and headaches.   Endo/Heme/Allergies:  Does not bruise/bleed easily.   Psychiatric/Behavioral:  The patient is not nervous/anxious.     ROS was reviewed and were negative except as above.    Objective    Most Recent Vital Signs:  Blood Pressure 130/80 (BP Location: Left arm, Patient Position: Sitting, BP Cuff Size: Large adult)   Pulse 88   Temperature (Abnormal) 35.6 °C (96 °F) (Temporal)   Respiration 16   Height 2.083 m (6' 10\")   Weight (Abnormal) 175 kg (385 lb 9.4 oz)   Oxygen Saturation 96%   Body Mass Index 40.32 kg/m²     Physical Exam:  Physical Exam  Vitals reviewed.   Constitutional:       Appearance: Normal appearance.   HENT:      Head: Normocephalic and atraumatic.      Nose: Nose normal.      Mouth/Throat:      Mouth: Mucous membranes are moist.   Eyes:      Pupils: Pupils are equal, round, and reactive to light.   Cardiovascular:      Rate and Rhythm: Normal rate and regular rhythm.   Pulmonary:      Effort: Pulmonary effort is normal.      Breath sounds: Normal breath sounds.   Abdominal:      Palpations: Abdomen is soft.   Musculoskeletal:         General: Tenderness present.      Cervical back: Normal range of motion and neck supple.      Comments: Left anterior ankle wound anterior with scab. No redness, swelling, drainage or warmth    Left exterior ankle wound macerated wound edges, no drainage or warmth.  Minimal swelling.    Skin:     General: Skin is warm and dry.      Capillary Refill: Capillary refill takes less than 2 seconds.      Coloration: Skin is not jaundiced.      Findings: No erythema or rash. "   Neurological:      Mental Status: He is alert and oriented to person, place, and time.      Motor: No weakness.   Psychiatric:         Mood and Affect: Mood normal.         Behavior: Behavior normal.        Pertinent Lab/Imaging Results:  [unfilled]  @CMP@  WBC   Date/Time Value Ref Range Status   03/27/2023 02:25 PM 8.8 4.8 - 10.8 K/uL Final     RBC   Date/Time Value Ref Range Status   03/27/2023 02:25 PM 5.66 4.70 - 6.10 M/uL Final     Hemoglobin   Date/Time Value Ref Range Status   03/27/2023 02:25 PM 15.3 14.0 - 18.0 g/dL Final     Hematocrit   Date/Time Value Ref Range Status   03/27/2023 02:25 PM 46.6 42.0 - 52.0 % Final     MCV   Date/Time Value Ref Range Status   03/27/2023 02:25 PM 82.3 81.4 - 97.8 fL Final     MCH   Date/Time Value Ref Range Status   03/27/2023 02:25 PM 27.0 27.0 - 33.0 pg Final     MCHC   Date/Time Value Ref Range Status   03/27/2023 02:25 PM 32.8 (L) 33.7 - 35.3 g/dL Final     MPV   Date/Time Value Ref Range Status   03/27/2023 02:25 PM 9.6 9.0 - 12.9 fL Final      Sodium   Date/Time Value Ref Range Status   03/27/2023 02:25  135 - 145 mmol/L Final     Potassium   Date/Time Value Ref Range Status   03/27/2023 02:25 PM 4.1 3.6 - 5.5 mmol/L Final     Chloride   Date/Time Value Ref Range Status   03/27/2023 02:25  96 - 112 mmol/L Final     Co2   Date/Time Value Ref Range Status   03/27/2023 02:25 PM 23 20 - 33 mmol/L Final     Glucose   Date/Time Value Ref Range Status   03/27/2023 02:25  (H) 65 - 99 mg/dL Final     Bun   Date/Time Value Ref Range Status   03/27/2023 02:25 PM 17 8 - 22 mg/dL Final     Creatinine   Date/Time Value Ref Range Status   03/27/2023 02:25 PM 0.97 0.50 - 1.40 mg/dL Final     Bun-Creatinine Ratio   Date/Time Value Ref Range Status   07/05/2022 09:20 PM 20.8  Final     Alkaline Phosphatase   Date/Time Value Ref Range Status   03/27/2023 02:25 PM 62 30 - 99 U/L Final     AST(SGOT)   Date/Time Value Ref Range Status   03/27/2023 02:25 PM 17 12 - 45  U/L Final     ALT(SGPT)   Date/Time Value Ref Range Status   03/27/2023 02:25 PM 24 2 - 50 U/L Final     Total Bilirubin   Date/Time Value Ref Range Status   03/27/2023 02:25 PM 0.4 0.1 - 1.5 mg/dL Final      CPK Total   Date/Time Value Ref Range Status   03/30/2023 02:10  (H) 0 - 154 U/L Final          No results found for: BLOODCULTU, BLDCULT, BCHOLD    No results found for: BLOODCULTU, BLDCULT, BCHOLD       CULTURE TISSUE W/ GRM STAIN  Order: [id number]  Status: Final result     Visible to patient: Yes (not seen)     Next appt: Today at 02:00 PM in Oncology (RN 3)     Specimen Information: Tissue   0 Result Notes      Component 2 wk ago   Significant Indicator POS Positive (POS)    Source TISS    Site Left Ankle    Culture Result - Abnormal     Gram Stain Result No organisms seen.    Culture Result  Abnormal   Proteus mirabilis   Rare growth     Resulting Agency M        Susceptibility     Proteus mirabilis     MURPHY     Ampicillin <=8 mcg/mL Sensitive     Ampicillin/sulbactam <=4/2 mcg/mL Sensitive     Cefazolin <=2 mcg/mL Sensitive     Cefepime <=2 mcg/mL Sensitive     Ceftriaxone <=1 mcg/mL Sensitive     Cefuroxime <=4 mcg/mL Sensitive     Ciprofloxacin <=0.25 mcg/mL Sensitive     Ertapenem <=0.5 mcg/mL Sensitive     Gentamicin <=2 mcg/mL Sensitive     Minocycline >8 mcg/mL Resistant     Moxifloxacin <=2 mcg/mL Sensitive     Pip/Tazobactam <=8 mcg/mL Sensitive     Tobramycin <=2 mcg/mL Sensitive     Trimeth/Sulfa <=0.5/9.5 m... Sensitive              Narrative          CULTURE WOUND W/ GRAM STAIN  Order: [id number]  Status: Final result     Visible to patient: Yes (not seen)     Next appt: Today at 02:00 PM in Oncology (RN 3)     Specimen Information: Wound   0 Result Notes      Component 2 wk ago   Significant Indicator POS Positive (POS)    Source WND    Site Left Ankle    Culture Result - Abnormal     Gram Stain Result Few WBCs.   No organisms seen.    Culture Result  Abnormal   Klebsiella oxytoca   Rare  growth     Culture Result  Abnormal   Enterococcus faecalis   Rare growth     Resulting Agency M        Susceptibility     Klebsiella oxytoca Enterococcus faecalis     MURPHY MURPHY     Ampicillin   <=2 mcg/mL Sensitive     Ampicillin/sulbactam 8/4 mcg/mL Sensitive       Cefazolin 16 mcg/mL Resistant       Cefepime <=2 mcg/mL Sensitive       Ceftriaxone <=1 mcg/mL Sensitive       Cefuroxime <=4 mcg/mL Sensitive       Ciprofloxacin <=0.25 mcg/mL Sensitive       Daptomycin   1 mcg/mL Sensitive     Ertapenem <=0.5 mcg/mL Sensitive       Gent Synergy   <=500 mcg/mL Sensitive     Gentamicin <=2 mcg/mL Sensitive       Minocycline <=4 mcg/mL Sensitive       Moxifloxacin <=2 mcg/mL Sensitive       Penicillin   2 mcg/mL Sensitive     Pip/Tazobactam <=8 mcg/mL Sensitive       Tigecycline <=2 mcg/mL Sensitive       Tobramycin <=2 mcg/mL Sensitive       Trimeth/Sulfa <=0.5/9.5 m... Sensitive       Vancomycin   1 mcg/mL Sensitive                Narrative            Impression/Assessment      1. Postoperative wound infection        2. 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 Polymicrobial bacterial infection          Gage Garcia is a 47 y.o. male admitted 3/12/2023 for drainage and swelling left ankle  47-year-old male with a past medical history significant for chronic systolic congestive heart failure, hypertension, [facility]. S/p ORIF left lateral malleolus and internal fixation left syndesmosis on 2/4/2023 with subsequent I&D on 3/14/2023 by Dr. Persaud.  Post op Wound infection.  OR Cultures + E faecalis (ampicillin Sensitive) , Klebsiella oxytoca , Proteus, Bianca magna.  Plan at discharge was 6 weeks of IV  daptomycin +ceftriaxone end date 4/25/2023.  Then transition to oral augmentin for 6 months.     PLAN:   - IV daptomycin +ceftriaxone end date 4/25/2023, transition to 6 months of p.o. Augmentin 875/125 mg twice daily  -  CPK labs biweekly.  CBC/CMP weekly  - Stop Lipitor as this could be increasing his CPK   - Continue routine follow-up with orthopedic surgery  - Recommend follow-up with wound care clinic.  Patient states he has pending referral which she will call back on Monday to schedule an appointment with the wound care clinic.       Return visit: 2 week. Follow up with primary care physician for chronic medical problems      I have performed a physical exam,  updated ROS and plan today. I have reviewed previous images, labs, and provider notes.      JIMMIE Abraham.    All Patients should seek medical re-evaluation or report to the ER for new, increasing or worsening symptoms. In some circumstances medical conditions can change from the initial evaluation and may require emergent medical re-evaluation. This includes but is not limited to chest pain, shortness of breath, atypical abdominal pain, atypical headache, ALOC, fever >101, low blood pressure, high respiratory rate (above 30), low oxygen saturation (below 90%), acute delirium, abnormal bleeding, inability to tolerate any intake, weakness on one side of the body, any worsened or concerning conditions.    Please note that this dictation was created using voice recognition software. I have worked with technical experts from [employer] [facility] to optimize the interface.  I have made every reasonable attempt to correct obvious errors, but there may be errors of grammar and possibly content that I did not discover before finalizing the note.

## 2023-03-31 NOTE — PROGRESS NOTES
Notified by infusion center that the patient CPK remains elevated now at 617.  Per nurse at infusion center he continued to deny any new symptoms such as weakness or myalgias.  We will continue with daptomycin for now with careful monitoring.  Plan will be to check CPK twice weekly.

## 2023-03-31 NOTE — PROGRESS NOTES
Gage arrived ambulatory to [facility] for daily antibiotic infusions. POC discussed with pt and he agrees with plan.    Pt denies muscle pain, no muscle weakness/cramps, no dark urine, no balance issues or numbness/tingling. Pt medicated per MAR. Pt tolerated infusion without s/s adverse reaction. PICC clave changed, PICC with brisk blood return, flushed with NS. Pt discharged to self care, NAD. Pt to return tomorrow.

## 2023-04-01 ENCOUNTER — OUTPATIENT INFUSION SERVICES (OUTPATIENT)
Dept: ONCOLOGY | Facility: MEDICAL CENTER | Age: 48
End: 2023-04-01
Attending: INTERNAL MEDICINE
Payer: COMMERCIAL

## 2023-04-01 VITALS
RESPIRATION RATE: 18 BRPM | DIASTOLIC BLOOD PRESSURE: 79 MMHG | SYSTOLIC BLOOD PRESSURE: 121 MMHG | HEART RATE: 91 BPM | OXYGEN SATURATION: 97 % | TEMPERATURE: 96.8 F

## 2023-04-01 DIAGNOSIS — T81.49XA POSTOPERATIVE WOUND INFECTION: ICD-10-CM

## 2023-04-01 PROCEDURE — 700105 HCHG RX REV CODE 258: Performed by: INTERNAL MEDICINE

## 2023-04-01 PROCEDURE — 96368 THER/DIAG CONCURRENT INF: CPT

## 2023-04-01 PROCEDURE — 96365 THER/PROPH/DIAG IV INF INIT: CPT

## 2023-04-01 PROCEDURE — 700111 HCHG RX REV CODE 636 W/ 250 OVERRIDE (IP): Performed by: INTERNAL MEDICINE

## 2023-04-01 RX ORDER — 0.9 % SODIUM CHLORIDE 0.9 %
3 VIAL (ML) INJECTION PRN
Status: CANCELLED | OUTPATIENT
Start: 2023-04-02

## 2023-04-01 RX ORDER — 0.9 % SODIUM CHLORIDE 0.9 %
VIAL (ML) INJECTION PRN
Status: CANCELLED | OUTPATIENT
Start: 2023-04-02

## 2023-04-01 RX ORDER — 0.9 % SODIUM CHLORIDE 0.9 %
10 VIAL (ML) INJECTION PRN
Status: CANCELLED | OUTPATIENT
Start: 2023-04-02

## 2023-04-01 RX ORDER — HEPARIN SODIUM (PORCINE) LOCK FLUSH IV SOLN 100 UNIT/ML 100 UNIT/ML
500 SOLUTION INTRAVENOUS PRN
Status: CANCELLED | OUTPATIENT
Start: 2023-04-02

## 2023-04-01 RX ADMIN — DAPTOMYCIN 1000 MG: 500 INJECTION, POWDER, LYOPHILIZED, FOR SOLUTION INTRAVENOUS at 14:07

## 2023-04-01 RX ADMIN — CEFTRIAXONE SODIUM 2 G: 2 INJECTION, POWDER, FOR SOLUTION INTRAMUSCULAR; INTRAVENOUS at 14:07

## 2023-04-01 NOTE — PROGRESS NOTES
Pt arrived ambulatory and stable for iv antibiotics, picc line flushing well with positive blood return, dressing intact, patient denies pain or acute distress, states  no changes in assessment.     Pt denies muscle pain, no muscle weakness/cramps, no dark urine, no balance issues or numbness/tingling.    Antibiotics administered as ordered, pt tolerated them well. PICC saline locked, positive for blood return.  Pt left clinic ambulatory and stable will return tomorrow.

## 2023-04-02 ENCOUNTER — OUTPATIENT INFUSION SERVICES (OUTPATIENT)
Dept: ONCOLOGY | Facility: MEDICAL CENTER | Age: 48
End: 2023-04-02
Attending: INTERNAL MEDICINE
Payer: COMMERCIAL

## 2023-04-02 VITALS
DIASTOLIC BLOOD PRESSURE: 84 MMHG | SYSTOLIC BLOOD PRESSURE: 125 MMHG | OXYGEN SATURATION: 98 % | HEART RATE: 86 BPM | TEMPERATURE: 98 F | RESPIRATION RATE: 18 BRPM

## 2023-04-02 DIAGNOSIS — T81.49XA POSTOPERATIVE WOUND INFECTION: ICD-10-CM

## 2023-04-02 PROCEDURE — 96368 THER/DIAG CONCURRENT INF: CPT

## 2023-04-02 PROCEDURE — 96365 THER/PROPH/DIAG IV INF INIT: CPT

## 2023-04-02 PROCEDURE — 700105 HCHG RX REV CODE 258: Performed by: INTERNAL MEDICINE

## 2023-04-02 PROCEDURE — 700111 HCHG RX REV CODE 636 W/ 250 OVERRIDE (IP): Performed by: INTERNAL MEDICINE

## 2023-04-02 RX ORDER — 0.9 % SODIUM CHLORIDE 0.9 %
10 VIAL (ML) INJECTION PRN
Status: CANCELLED | OUTPATIENT
Start: 2023-04-03

## 2023-04-02 RX ORDER — 0.9 % SODIUM CHLORIDE 0.9 %
3 VIAL (ML) INJECTION PRN
Status: CANCELLED | OUTPATIENT
Start: 2023-04-03

## 2023-04-02 RX ORDER — HEPARIN SODIUM (PORCINE) LOCK FLUSH IV SOLN 100 UNIT/ML 100 UNIT/ML
500 SOLUTION INTRAVENOUS PRN
Status: CANCELLED | OUTPATIENT
Start: 2023-04-03

## 2023-04-02 RX ORDER — 0.9 % SODIUM CHLORIDE 0.9 %
VIAL (ML) INJECTION PRN
Status: CANCELLED | OUTPATIENT
Start: 2023-04-03

## 2023-04-02 RX ADMIN — CEFTRIAXONE SODIUM 2 G: 2 INJECTION, POWDER, FOR SOLUTION INTRAMUSCULAR; INTRAVENOUS at 14:10

## 2023-04-02 RX ADMIN — DAPTOMYCIN 1000 MG: 500 INJECTION, POWDER, LYOPHILIZED, FOR SOLUTION INTRAVENOUS at 14:10

## 2023-04-02 NOTE — PROGRESS NOTES
Pt arrived ambulatory to [facility] for daily antibiotic infusion. POC discussed with pt and he agrees with plan. Pt denies muscle pain, no weakness/cramps, no dark urine, no balance issues , no numbness/tingling.     SHELLY single lumen PICC with brisk blood return. Pt medicated per MAR. Pt tolerated infusions without s/s adverse reaction. PICC flushed with NS. Pt discharged to self care, NAD. Pt to return tomorrow.

## 2023-04-03 ENCOUNTER — OUTPATIENT INFUSION SERVICES (OUTPATIENT)
Dept: ONCOLOGY | Facility: MEDICAL CENTER | Age: 48
End: 2023-04-03
Attending: INTERNAL MEDICINE
Payer: COMMERCIAL

## 2023-04-03 VITALS
HEART RATE: 86 BPM | HEIGHT: 78 IN | SYSTOLIC BLOOD PRESSURE: 115 MMHG | WEIGHT: 315 LBS | DIASTOLIC BLOOD PRESSURE: 81 MMHG | BODY MASS INDEX: 36.45 KG/M2 | OXYGEN SATURATION: 95 % | RESPIRATION RATE: 18 BRPM

## 2023-04-03 DIAGNOSIS — T81.49XA POSTOPERATIVE WOUND INFECTION: ICD-10-CM

## 2023-04-03 LAB
ALBUMIN SERPL BCP-MCNC: 4.1 G/DL (ref 3.2–4.9)
ALBUMIN/GLOB SERPL: 1.1 G/DL
ALP SERPL-CCNC: 65 U/L (ref 30–99)
ALT SERPL-CCNC: 24 U/L (ref 2–50)
ANION GAP SERPL CALC-SCNC: 13 MMOL/L (ref 7–16)
AST SERPL-CCNC: 22 U/L (ref 12–45)
BASOPHILS # BLD AUTO: 0.6 % (ref 0–1.8)
BASOPHILS # BLD: 0.05 K/UL (ref 0–0.12)
BILIRUB SERPL-MCNC: 0.5 MG/DL (ref 0.1–1.5)
BUN SERPL-MCNC: 22 MG/DL (ref 8–22)
CALCIUM ALBUM COR SERPL-MCNC: 9.1 MG/DL (ref 8.5–10.5)
CALCIUM SERPL-MCNC: 9.2 MG/DL (ref 8.5–10.5)
CHLORIDE SERPL-SCNC: 104 MMOL/L (ref 96–112)
CK SERPL-CCNC: 368 U/L (ref 0–154)
CO2 SERPL-SCNC: 21 MMOL/L (ref 20–33)
CREAT SERPL-MCNC: 1.15 MG/DL (ref 0.5–1.4)
CRP SERPL HS-MCNC: 2.32 MG/DL (ref 0–0.75)
EOSINOPHIL # BLD AUTO: 0.38 K/UL (ref 0–0.51)
EOSINOPHIL NFR BLD: 4.4 % (ref 0–6.9)
ERYTHROCYTE [DISTWIDTH] IN BLOOD BY AUTOMATED COUNT: 42.8 FL (ref 35.9–50)
ERYTHROCYTE [SEDIMENTATION RATE] IN BLOOD BY WESTERGREN METHOD: 6 MM/HOUR (ref 0–20)
GFR SERPLBLD CREATININE-BSD FMLA CKD-EPI: 79 ML/MIN/1.73 M 2
GLOBULIN SER CALC-MCNC: 3.6 G/DL (ref 1.9–3.5)
GLUCOSE SERPL-MCNC: 109 MG/DL (ref 65–99)
HCT VFR BLD AUTO: 45.8 % (ref 42–52)
HGB BLD-MCNC: 15.3 G/DL (ref 14–18)
IMM GRANULOCYTES # BLD AUTO: 0.04 K/UL (ref 0–0.11)
IMM GRANULOCYTES NFR BLD AUTO: 0.5 % (ref 0–0.9)
LYMPHOCYTES # BLD AUTO: 1.85 K/UL (ref 1–4.8)
LYMPHOCYTES NFR BLD: 21.3 % (ref 22–41)
MCH RBC QN AUTO: 27.3 PG (ref 27–33)
MCHC RBC AUTO-ENTMCNC: 33.4 G/DL (ref 33.7–35.3)
MCV RBC AUTO: 81.8 FL (ref 81.4–97.8)
MONOCYTES # BLD AUTO: 0.69 K/UL (ref 0–0.85)
MONOCYTES NFR BLD AUTO: 7.9 % (ref 0–13.4)
NEUTROPHILS # BLD AUTO: 5.67 K/UL (ref 1.82–7.42)
NEUTROPHILS NFR BLD: 65.3 % (ref 44–72)
NRBC # BLD AUTO: 0 K/UL
NRBC BLD-RTO: 0 /100 WBC
OUTPT INFUS CBC COMMENT OICOM: ABNORMAL
PLATELET # BLD AUTO: 243 K/UL (ref 164–446)
PMV BLD AUTO: 9.8 FL (ref 9–12.9)
POTASSIUM SERPL-SCNC: 4.2 MMOL/L (ref 3.6–5.5)
PROT SERPL-MCNC: 7.7 G/DL (ref 6–8.2)
RBC # BLD AUTO: 5.6 M/UL (ref 4.7–6.1)
SODIUM SERPL-SCNC: 138 MMOL/L (ref 135–145)
WBC # BLD AUTO: 8.7 K/UL (ref 4.8–10.8)

## 2023-04-03 PROCEDURE — 80053 COMPREHEN METABOLIC PANEL: CPT

## 2023-04-03 PROCEDURE — 85652 RBC SED RATE AUTOMATED: CPT

## 2023-04-03 PROCEDURE — 700111 HCHG RX REV CODE 636 W/ 250 OVERRIDE (IP): Performed by: INTERNAL MEDICINE

## 2023-04-03 PROCEDURE — 82550 ASSAY OF CK (CPK): CPT

## 2023-04-03 PROCEDURE — 96365 THER/PROPH/DIAG IV INF INIT: CPT

## 2023-04-03 PROCEDURE — 85025 COMPLETE CBC W/AUTO DIFF WBC: CPT

## 2023-04-03 PROCEDURE — 700105 HCHG RX REV CODE 258: Performed by: INTERNAL MEDICINE

## 2023-04-03 PROCEDURE — 96368 THER/DIAG CONCURRENT INF: CPT

## 2023-04-03 PROCEDURE — 86140 C-REACTIVE PROTEIN: CPT

## 2023-04-03 RX ORDER — 0.9 % SODIUM CHLORIDE 0.9 %
VIAL (ML) INJECTION PRN
Status: CANCELLED | OUTPATIENT
Start: 2023-04-04

## 2023-04-03 RX ORDER — HEPARIN SODIUM (PORCINE) LOCK FLUSH IV SOLN 100 UNIT/ML 100 UNIT/ML
500 SOLUTION INTRAVENOUS PRN
Status: CANCELLED | OUTPATIENT
Start: 2023-04-04

## 2023-04-03 RX ORDER — 0.9 % SODIUM CHLORIDE 0.9 %
10 VIAL (ML) INJECTION PRN
Status: CANCELLED | OUTPATIENT
Start: 2023-04-04

## 2023-04-03 RX ORDER — 0.9 % SODIUM CHLORIDE 0.9 %
3 VIAL (ML) INJECTION PRN
Status: CANCELLED | OUTPATIENT
Start: 2023-04-04

## 2023-04-03 RX ADMIN — DAPTOMYCIN 1000 MG: 500 INJECTION, POWDER, LYOPHILIZED, FOR SOLUTION INTRAVENOUS at 14:09

## 2023-04-03 RX ADMIN — CEFTRIAXONE SODIUM 2 G: 2 INJECTION, POWDER, FOR SOLUTION INTRAMUSCULAR; INTRAVENOUS at 14:08

## 2023-04-03 ASSESSMENT — FIBROSIS 4 INDEX: FIB4 SCORE: 0.68

## 2023-04-03 NOTE — PROGRESS NOTES
Gage returns to [facility] for daily antibiotic therapy.  Gage denies any acute changes over night, reports moderate fatigue.  PICC intact to RUE, flushes easily with brisk blood return.   Daptomycin and Rocephin infused concurrently, Gage tolerated well.  PICC line flushed with NS, + blood return again observed.  PICC wrapped in gauze and protective sleeve.  Gage discharged in NAD, next appointment confirmed.

## 2023-04-04 ENCOUNTER — OUTPATIENT INFUSION SERVICES (OUTPATIENT)
Dept: ONCOLOGY | Facility: MEDICAL CENTER | Age: 48
End: 2023-04-04
Attending: INTERNAL MEDICINE
Payer: COMMERCIAL

## 2023-04-04 VITALS
SYSTOLIC BLOOD PRESSURE: 114 MMHG | DIASTOLIC BLOOD PRESSURE: 75 MMHG | TEMPERATURE: 98.3 F | OXYGEN SATURATION: 95 % | RESPIRATION RATE: 16 BRPM | HEART RATE: 87 BPM

## 2023-04-04 DIAGNOSIS — T81.49XA POSTOPERATIVE WOUND INFECTION: ICD-10-CM

## 2023-04-04 PROCEDURE — 96368 THER/DIAG CONCURRENT INF: CPT

## 2023-04-04 PROCEDURE — 96365 THER/PROPH/DIAG IV INF INIT: CPT

## 2023-04-04 PROCEDURE — 700105 HCHG RX REV CODE 258: Performed by: INTERNAL MEDICINE

## 2023-04-04 PROCEDURE — 700111 HCHG RX REV CODE 636 W/ 250 OVERRIDE (IP): Performed by: INTERNAL MEDICINE

## 2023-04-04 RX ORDER — 0.9 % SODIUM CHLORIDE 0.9 %
10 VIAL (ML) INJECTION PRN
Status: CANCELLED | OUTPATIENT
Start: 2023-04-05

## 2023-04-04 RX ORDER — 0.9 % SODIUM CHLORIDE 0.9 %
VIAL (ML) INJECTION PRN
Status: CANCELLED | OUTPATIENT
Start: 2023-04-05

## 2023-04-04 RX ORDER — 0.9 % SODIUM CHLORIDE 0.9 %
3 VIAL (ML) INJECTION PRN
Status: CANCELLED | OUTPATIENT
Start: 2023-04-05

## 2023-04-04 RX ORDER — HEPARIN SODIUM (PORCINE) LOCK FLUSH IV SOLN 100 UNIT/ML 100 UNIT/ML
500 SOLUTION INTRAVENOUS PRN
Status: CANCELLED | OUTPATIENT
Start: 2023-04-05

## 2023-04-04 RX ADMIN — DAPTOMYCIN 1000 MG: 500 INJECTION, POWDER, LYOPHILIZED, FOR SOLUTION INTRAVENOUS at 14:20

## 2023-04-04 RX ADMIN — CEFTRIAXONE SODIUM 2 G: 2 INJECTION, POWDER, FOR SOLUTION INTRAMUSCULAR; INTRAVENOUS at 14:13

## 2023-04-04 NOTE — PROGRESS NOTES
Gage to infusion for daily Rocephin and daptomycin. Reports tolerating treatment well thus far aside from fatigue, denies muscle aches or cramping, CPK improved from last week. PICC flushed with NS with positive blood and connector cap changed. Rocephin and dapto infused concurrently, patient tolerated well with no adverse effects. PICC flushed with NS post infusion with positive blood. Patient left in stable condition, knows when to return for treatment tomorrow.

## 2023-04-05 ENCOUNTER — OUTPATIENT INFUSION SERVICES (OUTPATIENT)
Dept: ONCOLOGY | Facility: MEDICAL CENTER | Age: 48
End: 2023-04-05
Attending: INTERNAL MEDICINE
Payer: COMMERCIAL

## 2023-04-05 VITALS
DIASTOLIC BLOOD PRESSURE: 78 MMHG | OXYGEN SATURATION: 97 % | TEMPERATURE: 96.9 F | SYSTOLIC BLOOD PRESSURE: 143 MMHG | HEART RATE: 87 BPM | RESPIRATION RATE: 18 BRPM

## 2023-04-05 DIAGNOSIS — T81.49XA POSTOPERATIVE WOUND INFECTION: ICD-10-CM

## 2023-04-05 PROCEDURE — 700111 HCHG RX REV CODE 636 W/ 250 OVERRIDE (IP): Performed by: INTERNAL MEDICINE

## 2023-04-05 PROCEDURE — 96368 THER/DIAG CONCURRENT INF: CPT

## 2023-04-05 PROCEDURE — 96365 THER/PROPH/DIAG IV INF INIT: CPT

## 2023-04-05 PROCEDURE — 700105 HCHG RX REV CODE 258: Performed by: INTERNAL MEDICINE

## 2023-04-05 RX ORDER — 0.9 % SODIUM CHLORIDE 0.9 %
3 VIAL (ML) INJECTION PRN
Status: CANCELLED | OUTPATIENT
Start: 2023-04-06

## 2023-04-05 RX ORDER — 0.9 % SODIUM CHLORIDE 0.9 %
VIAL (ML) INJECTION PRN
Status: CANCELLED | OUTPATIENT
Start: 2023-04-06

## 2023-04-05 RX ORDER — 0.9 % SODIUM CHLORIDE 0.9 %
10 VIAL (ML) INJECTION PRN
Status: CANCELLED | OUTPATIENT
Start: 2023-04-06

## 2023-04-05 RX ORDER — HEPARIN SODIUM (PORCINE) LOCK FLUSH IV SOLN 100 UNIT/ML 100 UNIT/ML
500 SOLUTION INTRAVENOUS PRN
Status: CANCELLED | OUTPATIENT
Start: 2023-04-06

## 2023-04-05 RX ADMIN — CEFTRIAXONE SODIUM 2 G: 2 INJECTION, POWDER, FOR SOLUTION INTRAMUSCULAR; INTRAVENOUS at 14:21

## 2023-04-05 RX ADMIN — DAPTOMYCIN 1000 MG: 500 INJECTION, POWDER, LYOPHILIZED, FOR SOLUTION INTRAVENOUS at 14:20

## 2023-04-06 ENCOUNTER — OUTPATIENT INFUSION SERVICES (OUTPATIENT)
Dept: ONCOLOGY | Facility: MEDICAL CENTER | Age: 48
End: 2023-04-06
Attending: INTERNAL MEDICINE
Payer: COMMERCIAL

## 2023-04-06 VITALS
DIASTOLIC BLOOD PRESSURE: 73 MMHG | OXYGEN SATURATION: 98 % | RESPIRATION RATE: 18 BRPM | SYSTOLIC BLOOD PRESSURE: 120 MMHG | HEART RATE: 84 BPM | TEMPERATURE: 97.4 F

## 2023-04-06 DIAGNOSIS — T81.49XA POSTOPERATIVE WOUND INFECTION: ICD-10-CM

## 2023-04-06 LAB — CK SERPL-CCNC: 326 U/L (ref 0–154)

## 2023-04-06 PROCEDURE — 96368 THER/DIAG CONCURRENT INF: CPT

## 2023-04-06 PROCEDURE — 96365 THER/PROPH/DIAG IV INF INIT: CPT

## 2023-04-06 PROCEDURE — 700105 HCHG RX REV CODE 258: Performed by: INTERNAL MEDICINE

## 2023-04-06 PROCEDURE — 82550 ASSAY OF CK (CPK): CPT

## 2023-04-06 PROCEDURE — 700111 HCHG RX REV CODE 636 W/ 250 OVERRIDE (IP): Performed by: INTERNAL MEDICINE

## 2023-04-06 RX ORDER — 0.9 % SODIUM CHLORIDE 0.9 %
3 VIAL (ML) INJECTION PRN
Status: CANCELLED | OUTPATIENT
Start: 2023-04-07

## 2023-04-06 RX ORDER — 0.9 % SODIUM CHLORIDE 0.9 %
10 VIAL (ML) INJECTION PRN
Status: CANCELLED | OUTPATIENT
Start: 2023-04-07

## 2023-04-06 RX ORDER — 0.9 % SODIUM CHLORIDE 0.9 %
VIAL (ML) INJECTION PRN
Status: CANCELLED | OUTPATIENT
Start: 2023-04-07

## 2023-04-06 RX ORDER — HEPARIN SODIUM (PORCINE) LOCK FLUSH IV SOLN 100 UNIT/ML 100 UNIT/ML
500 SOLUTION INTRAVENOUS PRN
Status: CANCELLED | OUTPATIENT
Start: 2023-04-07

## 2023-04-06 RX ADMIN — CEFTRIAXONE SODIUM 2 G: 2 INJECTION, POWDER, FOR SOLUTION INTRAMUSCULAR; INTRAVENOUS at 14:25

## 2023-04-06 RX ADMIN — DAPTOMYCIN 1000 MG: 500 INJECTION, POWDER, LYOPHILIZED, FOR SOLUTION INTRAVENOUS at 14:26

## 2023-04-06 NOTE — PROGRESS NOTES
Mr Garcia is here today for daily antibiotics. He ans no new concerns to report. He denies muscle weakness or pain. PICC line to right upper arm, single lumen, flushed well with good blood return. Labs drawn per orders.    Antibiotics were infused without any adverse effects.    PICC was flushed with NS and sleeve placed over site.      Mr Garcia was discharged in stable condition.

## 2023-04-06 NOTE — PROGRESS NOTES
Gage is here for IV antibiotics. Gage denies muscle weakness or pain at this time. Right single lumen PICC in place. Brisk blood return noted. Dapto and Rocephin given per MAR concurrently. Dressing changed using sterile technique. PICC flushed with normal saline. PICC line secured with mesh sleeve. Next appointment scheduled. Discharged to self care; no apparent distress noted.

## 2023-04-07 ENCOUNTER — OUTPATIENT INFUSION SERVICES (OUTPATIENT)
Dept: ONCOLOGY | Facility: MEDICAL CENTER | Age: 48
End: 2023-04-07
Attending: INTERNAL MEDICINE
Payer: COMMERCIAL

## 2023-04-07 VITALS
DIASTOLIC BLOOD PRESSURE: 79 MMHG | SYSTOLIC BLOOD PRESSURE: 117 MMHG | RESPIRATION RATE: 18 BRPM | OXYGEN SATURATION: 98 % | TEMPERATURE: 98 F | HEART RATE: 84 BPM

## 2023-04-07 DIAGNOSIS — T81.49XA POSTOPERATIVE WOUND INFECTION: ICD-10-CM

## 2023-04-07 PROCEDURE — 700105 HCHG RX REV CODE 258: Performed by: INTERNAL MEDICINE

## 2023-04-07 PROCEDURE — 96368 THER/DIAG CONCURRENT INF: CPT

## 2023-04-07 PROCEDURE — 96365 THER/PROPH/DIAG IV INF INIT: CPT

## 2023-04-07 PROCEDURE — 700111 HCHG RX REV CODE 636 W/ 250 OVERRIDE (IP): Performed by: INTERNAL MEDICINE

## 2023-04-07 PROCEDURE — 96375 TX/PRO/DX INJ NEW DRUG ADDON: CPT

## 2023-04-07 RX ORDER — 0.9 % SODIUM CHLORIDE 0.9 %
10 VIAL (ML) INJECTION PRN
Status: CANCELLED | OUTPATIENT
Start: 2023-04-08

## 2023-04-07 RX ORDER — HEPARIN SODIUM (PORCINE) LOCK FLUSH IV SOLN 100 UNIT/ML 100 UNIT/ML
500 SOLUTION INTRAVENOUS PRN
Status: CANCELLED | OUTPATIENT
Start: 2023-04-08

## 2023-04-07 RX ORDER — 0.9 % SODIUM CHLORIDE 0.9 %
3 VIAL (ML) INJECTION PRN
Status: CANCELLED | OUTPATIENT
Start: 2023-04-08

## 2023-04-07 RX ORDER — 0.9 % SODIUM CHLORIDE 0.9 %
VIAL (ML) INJECTION PRN
Status: CANCELLED | OUTPATIENT
Start: 2023-04-08

## 2023-04-07 RX ADMIN — CEFTRIAXONE SODIUM 2 G: 2 INJECTION, POWDER, FOR SOLUTION INTRAMUSCULAR; INTRAVENOUS at 14:03

## 2023-04-07 RX ADMIN — DAPTOMYCIN 1000 MG: 500 INJECTION, POWDER, LYOPHILIZED, FOR SOLUTION INTRAVENOUS at 14:07

## 2023-04-07 NOTE — PROGRESS NOTES
Patient presents for daily IV antibiotics.  PICC flushes well with brisk blood return. Daptomycin and Rocephin infused as ordered, line flushed clear. PICC flushed per protocol and site secured. Patient returns tomorrow and released in no acute distress.

## 2023-04-08 ENCOUNTER — OUTPATIENT INFUSION SERVICES (OUTPATIENT)
Dept: ONCOLOGY | Facility: MEDICAL CENTER | Age: 48
End: 2023-04-08
Attending: INTERNAL MEDICINE
Payer: COMMERCIAL

## 2023-04-08 VITALS
DIASTOLIC BLOOD PRESSURE: 82 MMHG | RESPIRATION RATE: 16 BRPM | TEMPERATURE: 97.5 F | SYSTOLIC BLOOD PRESSURE: 135 MMHG | OXYGEN SATURATION: 98 % | HEART RATE: 77 BPM

## 2023-04-08 DIAGNOSIS — T81.49XA POSTOPERATIVE WOUND INFECTION: ICD-10-CM

## 2023-04-08 PROCEDURE — 96368 THER/DIAG CONCURRENT INF: CPT

## 2023-04-08 PROCEDURE — 96375 TX/PRO/DX INJ NEW DRUG ADDON: CPT

## 2023-04-08 PROCEDURE — 700111 HCHG RX REV CODE 636 W/ 250 OVERRIDE (IP): Performed by: INTERNAL MEDICINE

## 2023-04-08 PROCEDURE — 96365 THER/PROPH/DIAG IV INF INIT: CPT

## 2023-04-08 PROCEDURE — 700105 HCHG RX REV CODE 258: Performed by: INTERNAL MEDICINE

## 2023-04-08 RX ORDER — 0.9 % SODIUM CHLORIDE 0.9 %
10 VIAL (ML) INJECTION PRN
Status: CANCELLED | OUTPATIENT
Start: 2023-04-09

## 2023-04-08 RX ORDER — 0.9 % SODIUM CHLORIDE 0.9 %
VIAL (ML) INJECTION PRN
Status: CANCELLED | OUTPATIENT
Start: 2023-04-09

## 2023-04-08 RX ORDER — 0.9 % SODIUM CHLORIDE 0.9 %
3 VIAL (ML) INJECTION PRN
Status: CANCELLED | OUTPATIENT
Start: 2023-04-09

## 2023-04-08 RX ORDER — HEPARIN SODIUM (PORCINE) LOCK FLUSH IV SOLN 100 UNIT/ML 100 UNIT/ML
500 SOLUTION INTRAVENOUS PRN
Status: CANCELLED | OUTPATIENT
Start: 2023-04-09

## 2023-04-08 RX ADMIN — DAPTOMYCIN 1000 MG: 500 INJECTION, POWDER, LYOPHILIZED, FOR SOLUTION INTRAVENOUS at 14:04

## 2023-04-08 RX ADMIN — CEFTRIAXONE SODIUM 2 G: 2 INJECTION, POWDER, FOR SOLUTION INTRAMUSCULAR; INTRAVENOUS at 13:59

## 2023-04-09 ENCOUNTER — OUTPATIENT INFUSION SERVICES (OUTPATIENT)
Dept: ONCOLOGY | Facility: MEDICAL CENTER | Age: 48
End: 2023-04-09
Attending: INTERNAL MEDICINE
Payer: COMMERCIAL

## 2023-04-09 VITALS
WEIGHT: 315 LBS | TEMPERATURE: 98 F | HEART RATE: 81 BPM | SYSTOLIC BLOOD PRESSURE: 124 MMHG | BODY MASS INDEX: 40.1 KG/M2 | RESPIRATION RATE: 18 BRPM | OXYGEN SATURATION: 97 % | DIASTOLIC BLOOD PRESSURE: 79 MMHG

## 2023-04-09 DIAGNOSIS — T81.49XA POSTOPERATIVE WOUND INFECTION: ICD-10-CM

## 2023-04-09 PROCEDURE — 700105 HCHG RX REV CODE 258: Performed by: INTERNAL MEDICINE

## 2023-04-09 PROCEDURE — 96368 THER/DIAG CONCURRENT INF: CPT

## 2023-04-09 PROCEDURE — 96365 THER/PROPH/DIAG IV INF INIT: CPT

## 2023-04-09 PROCEDURE — 700111 HCHG RX REV CODE 636 W/ 250 OVERRIDE (IP): Performed by: INTERNAL MEDICINE

## 2023-04-09 RX ORDER — 0.9 % SODIUM CHLORIDE 0.9 %
10 VIAL (ML) INJECTION PRN
Status: CANCELLED | OUTPATIENT
Start: 2023-04-10

## 2023-04-09 RX ORDER — HEPARIN SODIUM (PORCINE) LOCK FLUSH IV SOLN 100 UNIT/ML 100 UNIT/ML
500 SOLUTION INTRAVENOUS PRN
Status: CANCELLED | OUTPATIENT
Start: 2023-04-10

## 2023-04-09 RX ORDER — 0.9 % SODIUM CHLORIDE 0.9 %
VIAL (ML) INJECTION PRN
Status: CANCELLED | OUTPATIENT
Start: 2023-04-10

## 2023-04-09 RX ORDER — 0.9 % SODIUM CHLORIDE 0.9 %
3 VIAL (ML) INJECTION PRN
Status: CANCELLED | OUTPATIENT
Start: 2023-04-10

## 2023-04-09 RX ADMIN — CEFTRIAXONE SODIUM 2 G: 2 INJECTION, POWDER, FOR SOLUTION INTRAMUSCULAR; INTRAVENOUS at 14:03

## 2023-04-09 RX ADMIN — DAPTOMYCIN 1000 MG: 500 INJECTION, POWDER, LYOPHILIZED, FOR SOLUTION INTRAVENOUS at 14:03

## 2023-04-09 ASSESSMENT — FIBROSIS 4 INDEX: FIB4 SCORE: 0.87

## 2023-04-09 NOTE — PROGRESS NOTES
Patient arrived for daily Rocephin/Dapto; report significant fatigue. Denies any pain, s/s infection or muscle cramping or aching.  PICC line assessed, pt reports taking shower and when removed saran wrap, top of tegaderm within dressing peeled off.  PICC dressing changed in sterile field.  PICC flushed with brisk blood return noted. Antibiotics infused without incident.  PICC flushed and pt's home dressing in place.  Returns tomorrow, discharged home in no apparent distress.

## 2023-04-10 ENCOUNTER — OUTPATIENT INFUSION SERVICES (OUTPATIENT)
Dept: ONCOLOGY | Facility: MEDICAL CENTER | Age: 48
End: 2023-04-10
Attending: INTERNAL MEDICINE
Payer: COMMERCIAL

## 2023-04-10 VITALS
SYSTOLIC BLOOD PRESSURE: 132 MMHG | BODY MASS INDEX: 36.45 KG/M2 | DIASTOLIC BLOOD PRESSURE: 82 MMHG | HEART RATE: 84 BPM | WEIGHT: 315 LBS | HEIGHT: 78 IN | RESPIRATION RATE: 18 BRPM | TEMPERATURE: 96.8 F | OXYGEN SATURATION: 94 %

## 2023-04-10 DIAGNOSIS — T81.49XA POSTOPERATIVE WOUND INFECTION: ICD-10-CM

## 2023-04-10 LAB
ALBUMIN SERPL BCP-MCNC: 3.9 G/DL (ref 3.2–4.9)
ALBUMIN/GLOB SERPL: 1.1 G/DL
ALP SERPL-CCNC: 63 U/L (ref 30–99)
ALT SERPL-CCNC: 21 U/L (ref 2–50)
ANION GAP SERPL CALC-SCNC: 11 MMOL/L (ref 7–16)
AST SERPL-CCNC: 18 U/L (ref 12–45)
BASOPHILS # BLD AUTO: 0.9 % (ref 0–1.8)
BASOPHILS # BLD: 0.07 K/UL (ref 0–0.12)
BILIRUB SERPL-MCNC: 0.7 MG/DL (ref 0.1–1.5)
BUN SERPL-MCNC: 15 MG/DL (ref 8–22)
CALCIUM ALBUM COR SERPL-MCNC: 9 MG/DL (ref 8.5–10.5)
CALCIUM SERPL-MCNC: 8.9 MG/DL (ref 8.5–10.5)
CHLORIDE SERPL-SCNC: 107 MMOL/L (ref 96–112)
CK SERPL-CCNC: 267 U/L (ref 0–154)
CO2 SERPL-SCNC: 24 MMOL/L (ref 20–33)
CREAT SERPL-MCNC: 1.02 MG/DL (ref 0.5–1.4)
EOSINOPHIL # BLD AUTO: 0.38 K/UL (ref 0–0.51)
EOSINOPHIL NFR BLD: 5 % (ref 0–6.9)
ERYTHROCYTE [DISTWIDTH] IN BLOOD BY AUTOMATED COUNT: 41.8 FL (ref 35.9–50)
GFR SERPLBLD CREATININE-BSD FMLA CKD-EPI: 91 ML/MIN/1.73 M 2
GLOBULIN SER CALC-MCNC: 3.5 G/DL (ref 1.9–3.5)
GLUCOSE SERPL-MCNC: 105 MG/DL (ref 65–99)
HCT VFR BLD AUTO: 45.7 % (ref 42–52)
HGB BLD-MCNC: 14.9 G/DL (ref 14–18)
IMM GRANULOCYTES # BLD AUTO: 0.05 K/UL (ref 0–0.11)
IMM GRANULOCYTES NFR BLD AUTO: 0.7 % (ref 0–0.9)
LYMPHOCYTES # BLD AUTO: 1.72 K/UL (ref 1–4.8)
LYMPHOCYTES NFR BLD: 22.5 % (ref 22–41)
MCH RBC QN AUTO: 26.4 PG (ref 27–33)
MCHC RBC AUTO-ENTMCNC: 32.6 G/DL (ref 33.7–35.3)
MCV RBC AUTO: 80.9 FL (ref 81.4–97.8)
MONOCYTES # BLD AUTO: 0.63 K/UL (ref 0–0.85)
MONOCYTES NFR BLD AUTO: 8.2 % (ref 0–13.4)
NEUTROPHILS # BLD AUTO: 4.79 K/UL (ref 1.82–7.42)
NEUTROPHILS NFR BLD: 62.7 % (ref 44–72)
NRBC # BLD AUTO: 0 K/UL
NRBC BLD-RTO: 0 /100 WBC
OUTPT INFUS CBC COMMENT OICOM: ABNORMAL
PLATELET # BLD AUTO: 241 K/UL (ref 164–446)
PMV BLD AUTO: 9.7 FL (ref 9–12.9)
POTASSIUM SERPL-SCNC: 3.9 MMOL/L (ref 3.6–5.5)
PROT SERPL-MCNC: 7.4 G/DL (ref 6–8.2)
RBC # BLD AUTO: 5.65 M/UL (ref 4.7–6.1)
SODIUM SERPL-SCNC: 142 MMOL/L (ref 135–145)
WBC # BLD AUTO: 7.6 K/UL (ref 4.8–10.8)

## 2023-04-10 PROCEDURE — 96365 THER/PROPH/DIAG IV INF INIT: CPT

## 2023-04-10 PROCEDURE — 700111 HCHG RX REV CODE 636 W/ 250 OVERRIDE (IP): Performed by: INTERNAL MEDICINE

## 2023-04-10 PROCEDURE — 700105 HCHG RX REV CODE 258: Performed by: INTERNAL MEDICINE

## 2023-04-10 PROCEDURE — 80053 COMPREHEN METABOLIC PANEL: CPT

## 2023-04-10 PROCEDURE — 82550 ASSAY OF CK (CPK): CPT

## 2023-04-10 PROCEDURE — 96368 THER/DIAG CONCURRENT INF: CPT

## 2023-04-10 PROCEDURE — 85025 COMPLETE CBC W/AUTO DIFF WBC: CPT

## 2023-04-10 RX ORDER — 0.9 % SODIUM CHLORIDE 0.9 %
10 VIAL (ML) INJECTION PRN
Status: CANCELLED | OUTPATIENT
Start: 2023-04-11

## 2023-04-10 RX ORDER — HEPARIN SODIUM (PORCINE) LOCK FLUSH IV SOLN 100 UNIT/ML 100 UNIT/ML
500 SOLUTION INTRAVENOUS PRN
Status: CANCELLED | OUTPATIENT
Start: 2023-04-11

## 2023-04-10 RX ORDER — 0.9 % SODIUM CHLORIDE 0.9 %
3 VIAL (ML) INJECTION PRN
Status: CANCELLED | OUTPATIENT
Start: 2023-04-11

## 2023-04-10 RX ORDER — 0.9 % SODIUM CHLORIDE 0.9 %
VIAL (ML) INJECTION PRN
Status: CANCELLED | OUTPATIENT
Start: 2023-04-11

## 2023-04-10 RX ADMIN — DAPTOMYCIN 1000 MG: 500 INJECTION, POWDER, LYOPHILIZED, FOR SOLUTION INTRAVENOUS at 14:17

## 2023-04-10 RX ADMIN — CEFTRIAXONE SODIUM 2 G: 2 INJECTION, POWDER, FOR SOLUTION INTRAMUSCULAR; INTRAVENOUS at 14:17

## 2023-04-10 ASSESSMENT — FIBROSIS 4 INDEX: FIB4 SCORE: 0.87

## 2023-04-10 NOTE — PROGRESS NOTES
Mr Garcia is here today for daily antibiotics.   He has nothing new to report.    PICC line to the right upper arm. Flushed well with good blood return.   Labs drawn today.    Antibiotics infused without adverse effects.   PICC line was flushed after and protective sleeve placed on the arm.    Pt was discharged and will return tomorrow.

## 2023-04-11 ENCOUNTER — OUTPATIENT INFUSION SERVICES (OUTPATIENT)
Dept: ONCOLOGY | Facility: MEDICAL CENTER | Age: 48
End: 2023-04-11
Attending: INTERNAL MEDICINE
Payer: COMMERCIAL

## 2023-04-11 VITALS
DIASTOLIC BLOOD PRESSURE: 83 MMHG | TEMPERATURE: 97 F | HEART RATE: 83 BPM | SYSTOLIC BLOOD PRESSURE: 133 MMHG | RESPIRATION RATE: 16 BRPM | OXYGEN SATURATION: 96 %

## 2023-04-11 DIAGNOSIS — T81.49XA POSTOPERATIVE WOUND INFECTION: ICD-10-CM

## 2023-04-11 LAB
FUNGUS SPEC CULT: NORMAL
FUNGUS SPEC FUNGUS STN: NORMAL
SIGNIFICANT IND 70042: NORMAL
SITE SITE: NORMAL
SOURCE SOURCE: NORMAL

## 2023-04-11 PROCEDURE — 96365 THER/PROPH/DIAG IV INF INIT: CPT

## 2023-04-11 PROCEDURE — 700105 HCHG RX REV CODE 258: Performed by: INTERNAL MEDICINE

## 2023-04-11 PROCEDURE — 700111 HCHG RX REV CODE 636 W/ 250 OVERRIDE (IP): Performed by: INTERNAL MEDICINE

## 2023-04-11 PROCEDURE — 96368 THER/DIAG CONCURRENT INF: CPT

## 2023-04-11 RX ORDER — 0.9 % SODIUM CHLORIDE 0.9 %
10 VIAL (ML) INJECTION PRN
Status: CANCELLED | OUTPATIENT
Start: 2023-04-12

## 2023-04-11 RX ORDER — 0.9 % SODIUM CHLORIDE 0.9 %
VIAL (ML) INJECTION PRN
Status: CANCELLED | OUTPATIENT
Start: 2023-04-12

## 2023-04-11 RX ORDER — HEPARIN SODIUM (PORCINE) LOCK FLUSH IV SOLN 100 UNIT/ML 100 UNIT/ML
500 SOLUTION INTRAVENOUS PRN
Status: CANCELLED | OUTPATIENT
Start: 2023-04-12

## 2023-04-11 RX ORDER — 0.9 % SODIUM CHLORIDE 0.9 %
3 VIAL (ML) INJECTION PRN
Status: CANCELLED | OUTPATIENT
Start: 2023-04-12

## 2023-04-11 RX ADMIN — DAPTOMYCIN 1000 MG: 500 INJECTION, POWDER, LYOPHILIZED, FOR SOLUTION INTRAVENOUS at 14:11

## 2023-04-11 RX ADMIN — CEFTRIAXONE SODIUM 2 G: 2 INJECTION, POWDER, FOR SOLUTION INTRAMUSCULAR; INTRAVENOUS at 14:11

## 2023-04-11 NOTE — PROGRESS NOTES
Pt arrived ambulatory to IS for daily antibiotics.  POC discussed.  Pt denies any new muscle pain or aching, labs from 4/10 reviewed with patient.  RUE PICC line in place, blood return noted, medications infused as ordered without complication.  Line flushed per policy, clave changed.  Return appointment confirmed, pt discharged from IS in NAD under self care.

## 2023-04-12 ENCOUNTER — OUTPATIENT INFUSION SERVICES (OUTPATIENT)
Dept: ONCOLOGY | Facility: MEDICAL CENTER | Age: 48
End: 2023-04-12
Attending: INTERNAL MEDICINE
Payer: COMMERCIAL

## 2023-04-12 VITALS
RESPIRATION RATE: 18 BRPM | SYSTOLIC BLOOD PRESSURE: 129 MMHG | DIASTOLIC BLOOD PRESSURE: 83 MMHG | HEART RATE: 87 BPM | TEMPERATURE: 97.2 F | OXYGEN SATURATION: 92 %

## 2023-04-12 DIAGNOSIS — T81.49XA POSTOPERATIVE WOUND INFECTION: ICD-10-CM

## 2023-04-12 PROCEDURE — 700111 HCHG RX REV CODE 636 W/ 250 OVERRIDE (IP): Performed by: INTERNAL MEDICINE

## 2023-04-12 PROCEDURE — 700105 HCHG RX REV CODE 258: Performed by: INTERNAL MEDICINE

## 2023-04-12 PROCEDURE — 96368 THER/DIAG CONCURRENT INF: CPT

## 2023-04-12 PROCEDURE — 96365 THER/PROPH/DIAG IV INF INIT: CPT

## 2023-04-12 RX ORDER — 0.9 % SODIUM CHLORIDE 0.9 %
10 VIAL (ML) INJECTION PRN
Status: CANCELLED | OUTPATIENT
Start: 2023-04-13

## 2023-04-12 RX ORDER — 0.9 % SODIUM CHLORIDE 0.9 %
3 VIAL (ML) INJECTION PRN
Status: CANCELLED | OUTPATIENT
Start: 2023-04-13

## 2023-04-12 RX ORDER — HEPARIN SODIUM (PORCINE) LOCK FLUSH IV SOLN 100 UNIT/ML 100 UNIT/ML
500 SOLUTION INTRAVENOUS PRN
Status: CANCELLED | OUTPATIENT
Start: 2023-04-13

## 2023-04-12 RX ORDER — 0.9 % SODIUM CHLORIDE 0.9 %
VIAL (ML) INJECTION PRN
Status: CANCELLED | OUTPATIENT
Start: 2023-04-13

## 2023-04-12 RX ADMIN — CEFTRIAXONE SODIUM 2 G: 2 INJECTION, POWDER, FOR SOLUTION INTRAMUSCULAR; INTRAVENOUS at 14:20

## 2023-04-12 RX ADMIN — DAPTOMYCIN 1000 MG: 500 INJECTION, POWDER, LYOPHILIZED, FOR SOLUTION INTRAVENOUS at 14:20

## 2023-04-13 ENCOUNTER — OUTPATIENT INFUSION SERVICES (OUTPATIENT)
Dept: ONCOLOGY | Facility: MEDICAL CENTER | Age: 48
End: 2023-04-13
Attending: INTERNAL MEDICINE
Payer: COMMERCIAL

## 2023-04-13 VITALS
RESPIRATION RATE: 18 BRPM | OXYGEN SATURATION: 95 % | HEART RATE: 85 BPM | SYSTOLIC BLOOD PRESSURE: 129 MMHG | DIASTOLIC BLOOD PRESSURE: 82 MMHG | TEMPERATURE: 97.6 F

## 2023-04-13 DIAGNOSIS — T81.49XA POSTOPERATIVE WOUND INFECTION: ICD-10-CM

## 2023-04-13 LAB — CK SERPL-CCNC: 291 U/L (ref 0–154)

## 2023-04-13 PROCEDURE — 82550 ASSAY OF CK (CPK): CPT

## 2023-04-13 PROCEDURE — 96368 THER/DIAG CONCURRENT INF: CPT

## 2023-04-13 PROCEDURE — 700111 HCHG RX REV CODE 636 W/ 250 OVERRIDE (IP): Performed by: INTERNAL MEDICINE

## 2023-04-13 PROCEDURE — 700105 HCHG RX REV CODE 258: Performed by: INTERNAL MEDICINE

## 2023-04-13 PROCEDURE — 96365 THER/PROPH/DIAG IV INF INIT: CPT

## 2023-04-13 RX ORDER — 0.9 % SODIUM CHLORIDE 0.9 %
10 VIAL (ML) INJECTION PRN
Status: CANCELLED | OUTPATIENT
Start: 2023-04-14

## 2023-04-13 RX ORDER — 0.9 % SODIUM CHLORIDE 0.9 %
VIAL (ML) INJECTION PRN
Status: CANCELLED | OUTPATIENT
Start: 2023-04-14

## 2023-04-13 RX ORDER — HEPARIN SODIUM (PORCINE) LOCK FLUSH IV SOLN 100 UNIT/ML 100 UNIT/ML
500 SOLUTION INTRAVENOUS PRN
Status: CANCELLED | OUTPATIENT
Start: 2023-04-14

## 2023-04-13 RX ORDER — 0.9 % SODIUM CHLORIDE 0.9 %
3 VIAL (ML) INJECTION PRN
Status: CANCELLED | OUTPATIENT
Start: 2023-04-14

## 2023-04-13 RX ADMIN — DAPTOMYCIN 1000 MG: 500 INJECTION, POWDER, LYOPHILIZED, FOR SOLUTION INTRAVENOUS at 14:10

## 2023-04-13 RX ADMIN — CEFTRIAXONE SODIUM 2 G: 2 INJECTION, POWDER, FOR SOLUTION INTRAMUSCULAR; INTRAVENOUS at 14:14

## 2023-04-13 NOTE — PROGRESS NOTES
returns for IVABX for Postoperative wound infection of L ankle. Rocephin / Daptomycin infused as ordered. Gage tolerated well and without incident. R U arm SL PICC line in good condition and has positive blood return. PICC line flushed with saline and dressing changed per protocol. Gage DC'd home in good condition and in NAD. Returns daily. Appointment confirm for next treatment.

## 2023-04-14 ENCOUNTER — OUTPATIENT INFUSION SERVICES (OUTPATIENT)
Dept: ONCOLOGY | Facility: MEDICAL CENTER | Age: 48
End: 2023-04-14
Attending: INTERNAL MEDICINE
Payer: COMMERCIAL

## 2023-04-14 VITALS
SYSTOLIC BLOOD PRESSURE: 143 MMHG | OXYGEN SATURATION: 99 % | DIASTOLIC BLOOD PRESSURE: 86 MMHG | RESPIRATION RATE: 16 BRPM | HEART RATE: 84 BPM | TEMPERATURE: 96.6 F

## 2023-04-14 DIAGNOSIS — T81.49XA POSTOPERATIVE WOUND INFECTION: ICD-10-CM

## 2023-04-14 PROCEDURE — 700111 HCHG RX REV CODE 636 W/ 250 OVERRIDE (IP): Performed by: INTERNAL MEDICINE

## 2023-04-14 PROCEDURE — 96368 THER/DIAG CONCURRENT INF: CPT

## 2023-04-14 PROCEDURE — 96365 THER/PROPH/DIAG IV INF INIT: CPT

## 2023-04-14 PROCEDURE — 700105 HCHG RX REV CODE 258: Performed by: INTERNAL MEDICINE

## 2023-04-14 RX ORDER — 0.9 % SODIUM CHLORIDE 0.9 %
10 VIAL (ML) INJECTION PRN
Status: CANCELLED | OUTPATIENT
Start: 2023-04-15

## 2023-04-14 RX ORDER — 0.9 % SODIUM CHLORIDE 0.9 %
3 VIAL (ML) INJECTION PRN
Status: CANCELLED | OUTPATIENT
Start: 2023-04-15

## 2023-04-14 RX ORDER — 0.9 % SODIUM CHLORIDE 0.9 %
VIAL (ML) INJECTION PRN
Status: CANCELLED | OUTPATIENT
Start: 2023-04-15

## 2023-04-14 RX ORDER — HEPARIN SODIUM (PORCINE) LOCK FLUSH IV SOLN 100 UNIT/ML 100 UNIT/ML
500 SOLUTION INTRAVENOUS PRN
Status: CANCELLED | OUTPATIENT
Start: 2023-04-15

## 2023-04-14 RX ADMIN — CEFTRIAXONE SODIUM 2 G: 2 INJECTION, POWDER, FOR SOLUTION INTRAMUSCULAR; INTRAVENOUS at 14:39

## 2023-04-14 RX ADMIN — DAPTOMYCIN 1000 MG: 500 INJECTION, POWDER, LYOPHILIZED, FOR SOLUTION INTRAVENOUS at 14:38

## 2023-04-14 NOTE — PROGRESS NOTES
Pt presented to infusion center for daily Dapto/Rocephin. Pt reports no significant changes since yesterday. Right arm PICC line in place, flushed and brisk blood return observed. CPK drawn per orders. Anbx infused with no s/s of adverse reaction. PICC line flushed, brisk blood return again observed, wrapped in gauze and protective sleeve. Has next appt, left on foot to self care.

## 2023-04-14 NOTE — PROGRESS NOTES
Mr Garcia is here today for daily antibiotic infusion.    He reports feeling fatigue. Denies muscle pain, weakness, cramps, dark urine, tingling, numbness or any balance problems.     PICC line to his upper right arm, flushed easily with good blood flow.     Antibiotics were given per MAR, and were tolerated well.    PICC line flushed post infusion, connector changed today.

## 2023-04-15 ENCOUNTER — OUTPATIENT INFUSION SERVICES (OUTPATIENT)
Dept: ONCOLOGY | Facility: MEDICAL CENTER | Age: 48
End: 2023-04-15
Attending: INTERNAL MEDICINE
Payer: COMMERCIAL

## 2023-04-15 VITALS
DIASTOLIC BLOOD PRESSURE: 80 MMHG | SYSTOLIC BLOOD PRESSURE: 137 MMHG | RESPIRATION RATE: 18 BRPM | HEART RATE: 80 BPM | OXYGEN SATURATION: 97 % | TEMPERATURE: 97.3 F

## 2023-04-15 DIAGNOSIS — T81.49XA POSTOPERATIVE WOUND INFECTION: ICD-10-CM

## 2023-04-15 PROCEDURE — 700111 HCHG RX REV CODE 636 W/ 250 OVERRIDE (IP): Performed by: INTERNAL MEDICINE

## 2023-04-15 PROCEDURE — 96365 THER/PROPH/DIAG IV INF INIT: CPT

## 2023-04-15 PROCEDURE — 700105 HCHG RX REV CODE 258: Performed by: INTERNAL MEDICINE

## 2023-04-15 RX ORDER — 0.9 % SODIUM CHLORIDE 0.9 %
10 VIAL (ML) INJECTION PRN
Status: CANCELLED | OUTPATIENT
Start: 2023-04-16

## 2023-04-15 RX ORDER — 0.9 % SODIUM CHLORIDE 0.9 %
VIAL (ML) INJECTION PRN
Status: CANCELLED | OUTPATIENT
Start: 2023-04-16

## 2023-04-15 RX ORDER — HEPARIN SODIUM (PORCINE) LOCK FLUSH IV SOLN 100 UNIT/ML 100 UNIT/ML
500 SOLUTION INTRAVENOUS PRN
Status: CANCELLED | OUTPATIENT
Start: 2023-04-16

## 2023-04-15 RX ORDER — 0.9 % SODIUM CHLORIDE 0.9 %
3 VIAL (ML) INJECTION PRN
Status: CANCELLED | OUTPATIENT
Start: 2023-04-16

## 2023-04-15 RX ADMIN — CEFTRIAXONE SODIUM 2 G: 2 INJECTION, POWDER, FOR SOLUTION INTRAMUSCULAR; INTRAVENOUS at 14:08

## 2023-04-15 RX ADMIN — DAPTOMYCIN 1000 MG: 500 INJECTION, POWDER, LYOPHILIZED, FOR SOLUTION INTRAVENOUS at 14:07

## 2023-04-15 NOTE — PROGRESS NOTES
Gage arrives to \[facility]\"" for daily antibiotics for a postoperative wound infection. Patient denies acute health concerns. C/o primarily of fatigue and loose/soft stool. Denies muscle pain, muscle weakness, muscle cramping, dark urine, tingling/numbness, balance issues. RAFI  PICC continues to flush easily and have brisk blood return. Daptomycin and Rocephin infused per MAR without issues. Patient tolerated infusions well. Patient to return tomorrow 4/16. Discharged home to self care in no apparent distress.

## 2023-04-16 ENCOUNTER — OUTPATIENT INFUSION SERVICES (OUTPATIENT)
Dept: ONCOLOGY | Facility: MEDICAL CENTER | Age: 48
End: 2023-04-16
Attending: INTERNAL MEDICINE
Payer: COMMERCIAL

## 2023-04-16 VITALS
HEART RATE: 82 BPM | SYSTOLIC BLOOD PRESSURE: 134 MMHG | RESPIRATION RATE: 18 BRPM | OXYGEN SATURATION: 96 % | TEMPERATURE: 96.6 F | DIASTOLIC BLOOD PRESSURE: 82 MMHG

## 2023-04-16 DIAGNOSIS — T81.49XA POSTOPERATIVE WOUND INFECTION: ICD-10-CM

## 2023-04-16 PROCEDURE — 700105 HCHG RX REV CODE 258: Performed by: INTERNAL MEDICINE

## 2023-04-16 PROCEDURE — 96365 THER/PROPH/DIAG IV INF INIT: CPT

## 2023-04-16 PROCEDURE — 96368 THER/DIAG CONCURRENT INF: CPT

## 2023-04-16 PROCEDURE — 96375 TX/PRO/DX INJ NEW DRUG ADDON: CPT

## 2023-04-16 PROCEDURE — 700111 HCHG RX REV CODE 636 W/ 250 OVERRIDE (IP): Performed by: INTERNAL MEDICINE

## 2023-04-16 RX ORDER — 0.9 % SODIUM CHLORIDE 0.9 %
10 VIAL (ML) INJECTION PRN
Status: CANCELLED | OUTPATIENT
Start: 2023-04-17

## 2023-04-16 RX ORDER — 0.9 % SODIUM CHLORIDE 0.9 %
VIAL (ML) INJECTION PRN
Status: CANCELLED | OUTPATIENT
Start: 2023-04-17

## 2023-04-16 RX ORDER — HEPARIN SODIUM (PORCINE) LOCK FLUSH IV SOLN 100 UNIT/ML 100 UNIT/ML
500 SOLUTION INTRAVENOUS PRN
Status: CANCELLED | OUTPATIENT
Start: 2023-04-17

## 2023-04-16 RX ORDER — 0.9 % SODIUM CHLORIDE 0.9 %
3 VIAL (ML) INJECTION PRN
Status: CANCELLED | OUTPATIENT
Start: 2023-04-17

## 2023-04-16 RX ADMIN — DAPTOMYCIN 1000 MG: 500 INJECTION, POWDER, LYOPHILIZED, FOR SOLUTION INTRAVENOUS at 14:05

## 2023-04-16 RX ADMIN — CEFTRIAXONE SODIUM 2 G: 2 INJECTION, POWDER, FOR SOLUTION INTRAMUSCULAR; INTRAVENOUS at 14:06

## 2023-04-16 NOTE — PROGRESS NOTES
Pt arrived to IS, ambulatory, for Dapto/Rocephin. Pt denies any muscle aching or cramping, reports ongoing fatigue. PICC line flushed per policy, positive blood return noted. Dapto and rocephin infused with no s/sx of adverse reaction. PICC line flushed per policy. Pt left IS with no s/sx of distress. Follow up appointment confirmed.

## 2023-04-17 ENCOUNTER — OUTPATIENT INFUSION SERVICES (OUTPATIENT)
Dept: ONCOLOGY | Facility: MEDICAL CENTER | Age: 48
End: 2023-04-17
Attending: INTERNAL MEDICINE
Payer: COMMERCIAL

## 2023-04-17 VITALS
RESPIRATION RATE: 19 BRPM | HEART RATE: 84 BPM | OXYGEN SATURATION: 99 % | DIASTOLIC BLOOD PRESSURE: 76 MMHG | SYSTOLIC BLOOD PRESSURE: 127 MMHG | TEMPERATURE: 96.9 F

## 2023-04-17 DIAGNOSIS — I50.22 CHRONIC SYSTOLIC CHF (CONGESTIVE HEART FAILURE) (HCC): ICD-10-CM

## 2023-04-17 DIAGNOSIS — T81.49XA POSTOPERATIVE WOUND INFECTION: ICD-10-CM

## 2023-04-17 LAB
ALBUMIN SERPL BCP-MCNC: 3.9 G/DL (ref 3.2–4.9)
ALBUMIN/GLOB SERPL: 1.1 G/DL
ALP SERPL-CCNC: 74 U/L (ref 30–99)
ALT SERPL-CCNC: 22 U/L (ref 2–50)
ANION GAP SERPL CALC-SCNC: 12 MMOL/L (ref 7–16)
AST SERPL-CCNC: 20 U/L (ref 12–45)
BASOPHILS # BLD AUTO: 0.6 % (ref 0–1.8)
BASOPHILS # BLD: 0.06 K/UL (ref 0–0.12)
BILIRUB SERPL-MCNC: 0.2 MG/DL (ref 0.1–1.5)
BUN SERPL-MCNC: 22 MG/DL (ref 8–22)
CALCIUM ALBUM COR SERPL-MCNC: 9.1 MG/DL (ref 8.5–10.5)
CALCIUM SERPL-MCNC: 9 MG/DL (ref 8.5–10.5)
CHLORIDE SERPL-SCNC: 106 MMOL/L (ref 96–112)
CK SERPL-CCNC: 242 U/L (ref 0–154)
CO2 SERPL-SCNC: 22 MMOL/L (ref 20–33)
CREAT SERPL-MCNC: 1.06 MG/DL (ref 0.5–1.4)
CRP SERPL HS-MCNC: 1.08 MG/DL (ref 0–0.75)
EOSINOPHIL # BLD AUTO: 0.4 K/UL (ref 0–0.51)
EOSINOPHIL NFR BLD: 4.1 % (ref 0–6.9)
ERYTHROCYTE [DISTWIDTH] IN BLOOD BY AUTOMATED COUNT: 43.7 FL (ref 35.9–50)
ERYTHROCYTE [SEDIMENTATION RATE] IN BLOOD BY WESTERGREN METHOD: 9 MM/HOUR (ref 0–20)
GFR SERPLBLD CREATININE-BSD FMLA CKD-EPI: 87 ML/MIN/1.73 M 2
GLOBULIN SER CALC-MCNC: 3.4 G/DL (ref 1.9–3.5)
GLUCOSE SERPL-MCNC: 114 MG/DL (ref 65–99)
HCT VFR BLD AUTO: 48 % (ref 42–52)
HGB BLD-MCNC: 15.5 G/DL (ref 14–18)
IMM GRANULOCYTES # BLD AUTO: 0.06 K/UL (ref 0–0.11)
IMM GRANULOCYTES NFR BLD AUTO: 0.6 % (ref 0–0.9)
LYMPHOCYTES # BLD AUTO: 1.95 K/UL (ref 1–4.8)
LYMPHOCYTES NFR BLD: 19.9 % (ref 22–41)
MCH RBC QN AUTO: 26.6 PG (ref 27–33)
MCHC RBC AUTO-ENTMCNC: 32.3 G/DL (ref 33.7–35.3)
MCV RBC AUTO: 82.5 FL (ref 81.4–97.8)
MONOCYTES # BLD AUTO: 0.85 K/UL (ref 0–0.85)
MONOCYTES NFR BLD AUTO: 8.7 % (ref 0–13.4)
NEUTROPHILS # BLD AUTO: 6.49 K/UL (ref 1.82–7.42)
NEUTROPHILS NFR BLD: 66.1 % (ref 44–72)
NRBC # BLD AUTO: 0 K/UL
NRBC BLD-RTO: 0 /100 WBC
OUTPT INFUS CBC COMMENT OICOM: ABNORMAL
PLATELET # BLD AUTO: 220 K/UL (ref 164–446)
PMV BLD AUTO: 10.1 FL (ref 9–12.9)
POTASSIUM SERPL-SCNC: 4 MMOL/L (ref 3.6–5.5)
PROT SERPL-MCNC: 7.3 G/DL (ref 6–8.2)
RBC # BLD AUTO: 5.82 M/UL (ref 4.7–6.1)
SODIUM SERPL-SCNC: 140 MMOL/L (ref 135–145)
WBC # BLD AUTO: 9.8 K/UL (ref 4.8–10.8)

## 2023-04-17 PROCEDURE — 700111 HCHG RX REV CODE 636 W/ 250 OVERRIDE (IP): Performed by: INTERNAL MEDICINE

## 2023-04-17 PROCEDURE — 82550 ASSAY OF CK (CPK): CPT

## 2023-04-17 PROCEDURE — 85652 RBC SED RATE AUTOMATED: CPT

## 2023-04-17 PROCEDURE — 85025 COMPLETE CBC W/AUTO DIFF WBC: CPT

## 2023-04-17 PROCEDURE — 96368 THER/DIAG CONCURRENT INF: CPT

## 2023-04-17 PROCEDURE — 96365 THER/PROPH/DIAG IV INF INIT: CPT

## 2023-04-17 PROCEDURE — 700105 HCHG RX REV CODE 258: Performed by: INTERNAL MEDICINE

## 2023-04-17 PROCEDURE — 86140 C-REACTIVE PROTEIN: CPT

## 2023-04-17 PROCEDURE — 80053 COMPREHEN METABOLIC PANEL: CPT

## 2023-04-17 RX ORDER — HYDRALAZINE HYDROCHLORIDE 100 MG/1
100 TABLET, FILM COATED ORAL EVERY 8 HOURS
Qty: 90 TABLET | Refills: 0 | Status: SHIPPED | OUTPATIENT
Start: 2023-04-17 | End: 2023-05-17

## 2023-04-17 RX ORDER — CARVEDILOL 25 MG/1
25 TABLET ORAL 2 TIMES DAILY WITH MEALS
Qty: 60 TABLET | Refills: 0 | Status: SHIPPED | OUTPATIENT
Start: 2023-04-17 | End: 2023-05-17

## 2023-04-17 RX ORDER — SPIRONOLACTONE 50 MG/1
50 TABLET, FILM COATED ORAL DAILY
Qty: 30 TABLET | Refills: 3 | Status: SHIPPED | OUTPATIENT
Start: 2023-04-17 | End: 2023-08-18

## 2023-04-17 RX ORDER — ISOSORBIDE MONONITRATE 60 MG/1
60 TABLET, EXTENDED RELEASE ORAL DAILY
Qty: 30 TABLET | Refills: 0 | Status: SHIPPED | OUTPATIENT
Start: 2023-04-17 | End: 2023-05-17

## 2023-04-17 RX ORDER — LOSARTAN POTASSIUM 100 MG/1
100 TABLET ORAL EVERY EVENING
Qty: 30 TABLET | Refills: 0 | Status: SHIPPED | OUTPATIENT
Start: 2023-04-17 | End: 2023-05-17

## 2023-04-17 RX ORDER — 0.9 % SODIUM CHLORIDE 0.9 %
3 VIAL (ML) INJECTION PRN
Status: CANCELLED | OUTPATIENT
Start: 2023-04-18

## 2023-04-17 RX ORDER — FUROSEMIDE 20 MG/1
20 TABLET ORAL 2 TIMES DAILY
Qty: 60 TABLET | Refills: 3 | Status: SHIPPED | OUTPATIENT
Start: 2023-04-17 | End: 2023-05-17

## 2023-04-17 RX ORDER — HEPARIN SODIUM (PORCINE) LOCK FLUSH IV SOLN 100 UNIT/ML 100 UNIT/ML
500 SOLUTION INTRAVENOUS PRN
Status: CANCELLED | OUTPATIENT
Start: 2023-04-18

## 2023-04-17 RX ORDER — 0.9 % SODIUM CHLORIDE 0.9 %
VIAL (ML) INJECTION PRN
Status: CANCELLED | OUTPATIENT
Start: 2023-04-18

## 2023-04-17 RX ORDER — AMLODIPINE BESYLATE 10 MG/1
10 TABLET ORAL DAILY
Qty: 30 TABLET | Refills: 0 | Status: SHIPPED | OUTPATIENT
Start: 2023-04-17 | End: 2023-05-17

## 2023-04-17 RX ORDER — 0.9 % SODIUM CHLORIDE 0.9 %
10 VIAL (ML) INJECTION PRN
Status: CANCELLED | OUTPATIENT
Start: 2023-04-18

## 2023-04-17 RX ADMIN — DAPTOMYCIN 1000 MG: 500 INJECTION, POWDER, LYOPHILIZED, FOR SOLUTION INTRAVENOUS at 14:20

## 2023-04-17 RX ADMIN — CEFTRIAXONE SODIUM 2 G: 2 INJECTION, POWDER, FOR SOLUTION INTRAMUSCULAR; INTRAVENOUS at 14:20

## 2023-04-17 NOTE — PROGRESS NOTES
Pt arrived ambulatory and stable for iv antibiotics, picc line flushing well with positive blood return, dressing intact, patient denies pain or acute distress, states  no changes in assessment.     Pt denies muscle pain, no muscle weakness/cramps, no dark urine, no balance issues or numbness/tingling.    PICC line flushing well with positive blood return, labs drawn     Antibiotics administered as ordered, pt tolerated them well. PICC saline locked, positive for blood return.  Pt left clinic ambulatory and stable will return tomorrow.

## 2023-04-18 ENCOUNTER — OUTPATIENT INFUSION SERVICES (OUTPATIENT)
Dept: ONCOLOGY | Facility: MEDICAL CENTER | Age: 48
End: 2023-04-18
Attending: INTERNAL MEDICINE
Payer: COMMERCIAL

## 2023-04-18 VITALS
RESPIRATION RATE: 18 BRPM | DIASTOLIC BLOOD PRESSURE: 110 MMHG | SYSTOLIC BLOOD PRESSURE: 169 MMHG | HEART RATE: 93 BPM | TEMPERATURE: 97.5 F | OXYGEN SATURATION: 97 %

## 2023-04-18 DIAGNOSIS — T81.49XA POSTOPERATIVE WOUND INFECTION: ICD-10-CM

## 2023-04-18 PROCEDURE — 700105 HCHG RX REV CODE 258: Performed by: INTERNAL MEDICINE

## 2023-04-18 PROCEDURE — 700111 HCHG RX REV CODE 636 W/ 250 OVERRIDE (IP): Performed by: INTERNAL MEDICINE

## 2023-04-18 RX ORDER — HEPARIN SODIUM (PORCINE) LOCK FLUSH IV SOLN 100 UNIT/ML 100 UNIT/ML
500 SOLUTION INTRAVENOUS PRN
Status: CANCELLED | OUTPATIENT
Start: 2023-04-19

## 2023-04-18 RX ORDER — 0.9 % SODIUM CHLORIDE 0.9 %
10 VIAL (ML) INJECTION PRN
Status: CANCELLED | OUTPATIENT
Start: 2023-04-19

## 2023-04-18 RX ORDER — 0.9 % SODIUM CHLORIDE 0.9 %
VIAL (ML) INJECTION PRN
Status: CANCELLED | OUTPATIENT
Start: 2023-04-19

## 2023-04-18 RX ORDER — 0.9 % SODIUM CHLORIDE 0.9 %
3 VIAL (ML) INJECTION PRN
Status: CANCELLED | OUTPATIENT
Start: 2023-04-19

## 2023-04-18 RX ADMIN — CEFTRIAXONE SODIUM 2 G: 2 INJECTION, POWDER, FOR SOLUTION INTRAMUSCULAR; INTRAVENOUS at 14:13

## 2023-04-18 RX ADMIN — DAPTOMYCIN 1000 MG: 500 INJECTION, POWDER, LYOPHILIZED, FOR SOLUTION INTRAVENOUS at 14:15

## 2023-04-18 NOTE — PROGRESS NOTES
Patient presents for daily IV antibiotics. Patient educated on importance of taking HTN medication.Patient states he ran out of his medication and is picking up his prescription this evening and will take it tonight. Patient educated on s/s of stroke and verbalizes understanding. PICC flushes well with brisk blood retrun. Rocephin and Daptomycin infused as ordered, line flushed clear. PICC flushed per protocol and site secured. Patient returns tomorrow and released in no acute distress

## 2023-04-19 ENCOUNTER — OUTPATIENT INFUSION SERVICES (OUTPATIENT)
Dept: ONCOLOGY | Facility: MEDICAL CENTER | Age: 48
End: 2023-04-19
Attending: INTERNAL MEDICINE
Payer: COMMERCIAL

## 2023-04-19 VITALS
TEMPERATURE: 97.1 F | SYSTOLIC BLOOD PRESSURE: 131 MMHG | RESPIRATION RATE: 18 BRPM | OXYGEN SATURATION: 96 % | DIASTOLIC BLOOD PRESSURE: 76 MMHG | HEART RATE: 82 BPM

## 2023-04-19 DIAGNOSIS — T81.49XA POSTOPERATIVE WOUND INFECTION: ICD-10-CM

## 2023-04-19 PROCEDURE — 700111 HCHG RX REV CODE 636 W/ 250 OVERRIDE (IP): Performed by: INTERNAL MEDICINE

## 2023-04-19 PROCEDURE — 96368 THER/DIAG CONCURRENT INF: CPT

## 2023-04-19 PROCEDURE — 96365 THER/PROPH/DIAG IV INF INIT: CPT

## 2023-04-19 PROCEDURE — 700105 HCHG RX REV CODE 258: Performed by: INTERNAL MEDICINE

## 2023-04-19 RX ORDER — 0.9 % SODIUM CHLORIDE 0.9 %
10 VIAL (ML) INJECTION PRN
Status: CANCELLED | OUTPATIENT
Start: 2023-04-20

## 2023-04-19 RX ORDER — 0.9 % SODIUM CHLORIDE 0.9 %
VIAL (ML) INJECTION PRN
Status: CANCELLED | OUTPATIENT
Start: 2023-04-20

## 2023-04-19 RX ORDER — 0.9 % SODIUM CHLORIDE 0.9 %
3 VIAL (ML) INJECTION PRN
Status: CANCELLED | OUTPATIENT
Start: 2023-04-20

## 2023-04-19 RX ORDER — HEPARIN SODIUM (PORCINE) LOCK FLUSH IV SOLN 100 UNIT/ML 100 UNIT/ML
500 SOLUTION INTRAVENOUS PRN
Status: CANCELLED | OUTPATIENT
Start: 2023-04-20

## 2023-04-19 RX ADMIN — CEFTRIAXONE SODIUM 2 G: 2 INJECTION, POWDER, FOR SOLUTION INTRAMUSCULAR; INTRAVENOUS at 14:15

## 2023-04-19 RX ADMIN — DAPTOMYCIN 1000 MG: 500 INJECTION, POWDER, LYOPHILIZED, FOR SOLUTION INTRAVENOUS at 14:13

## 2023-04-19 NOTE — PROGRESS NOTES
Pt to IS for daily dapto/cef. POC discussed and pt in agreement. Pt denies fever, muscle pain, weakness, cramps, dark urine, tingling/numbness or balance problems today. PICC line flushed with brisk blood return noted, dressing changed per [facility] policy and pt tolerated well. Antibiotics administered per MAR and pt tolerated well. PICC line flushed, green cap applied and line secured with protective sleeve. Pt confirms tomorrow's appt and discharged ambulatory in [facility].

## 2023-04-20 ENCOUNTER — OUTPATIENT INFUSION SERVICES (OUTPATIENT)
Dept: ONCOLOGY | Facility: MEDICAL CENTER | Age: 48
End: 2023-04-20
Attending: INTERNAL MEDICINE
Payer: COMMERCIAL

## 2023-04-20 VITALS
DIASTOLIC BLOOD PRESSURE: 79 MMHG | TEMPERATURE: 97.6 F | SYSTOLIC BLOOD PRESSURE: 133 MMHG | OXYGEN SATURATION: 92 % | HEART RATE: 83 BPM | RESPIRATION RATE: 18 BRPM

## 2023-04-20 DIAGNOSIS — T81.49XA POSTOPERATIVE WOUND INFECTION: ICD-10-CM

## 2023-04-20 LAB — CK SERPL-CCNC: 287 U/L (ref 0–154)

## 2023-04-20 PROCEDURE — 82550 ASSAY OF CK (CPK): CPT

## 2023-04-20 PROCEDURE — 96365 THER/PROPH/DIAG IV INF INIT: CPT

## 2023-04-20 PROCEDURE — 700105 HCHG RX REV CODE 258: Performed by: INTERNAL MEDICINE

## 2023-04-20 PROCEDURE — 700111 HCHG RX REV CODE 636 W/ 250 OVERRIDE (IP): Performed by: INTERNAL MEDICINE

## 2023-04-20 PROCEDURE — 96375 TX/PRO/DX INJ NEW DRUG ADDON: CPT

## 2023-04-20 PROCEDURE — 96368 THER/DIAG CONCURRENT INF: CPT

## 2023-04-20 RX ORDER — HEPARIN SODIUM (PORCINE) LOCK FLUSH IV SOLN 100 UNIT/ML 100 UNIT/ML
500 SOLUTION INTRAVENOUS PRN
Status: CANCELLED | OUTPATIENT
Start: 2023-04-21

## 2023-04-20 RX ORDER — 0.9 % SODIUM CHLORIDE 0.9 %
10 VIAL (ML) INJECTION PRN
Status: CANCELLED | OUTPATIENT
Start: 2023-04-21

## 2023-04-20 RX ORDER — 0.9 % SODIUM CHLORIDE 0.9 %
3 VIAL (ML) INJECTION PRN
Status: CANCELLED | OUTPATIENT
Start: 2023-04-21

## 2023-04-20 RX ORDER — 0.9 % SODIUM CHLORIDE 0.9 %
VIAL (ML) INJECTION PRN
Status: CANCELLED | OUTPATIENT
Start: 2023-04-21

## 2023-04-20 RX ADMIN — CEFTRIAXONE SODIUM 2 G: 2 INJECTION, POWDER, FOR SOLUTION INTRAMUSCULAR; INTRAVENOUS at 14:17

## 2023-04-20 RX ADMIN — DAPTOMYCIN 1000 MG: 500 INJECTION, POWDER, LYOPHILIZED, FOR SOLUTION INTRAVENOUS at 14:17

## 2023-04-20 ASSESSMENT — ENCOUNTER SYMPTOMS
COUGH: 0
DIARRHEA: 0
BLURRED VISION: 0
HEADACHES: 0
BACK PAIN: 0
SHORTNESS OF BREATH: 0
DIZZINESS: 0
MYALGIAS: 0
NECK PAIN: 0
NERVOUS/ANXIOUS: 0
BRUISES/BLEEDS EASILY: 0
WHEEZING: 0
SPUTUM PRODUCTION: 0
PALPITATIONS: 0
WEIGHT LOSS: 0
DOUBLE VISION: 0
ABDOMINAL PAIN: 0
CHILLS: 0
FEVER: 0
NAUSEA: 0
VOMITING: 0

## 2023-04-20 NOTE — PROGRESS NOTES
INFECTIOUS  DISEASE  OUTPATIENT CLINIC  NOTE   Subjective   Primary care provider: Juan Davalos M.D..     Reason for Follow Up:   Follow-up for   1. Postoperative wound infection        2. 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 Polymicrobial bacterial infection  amoxicillin-clavulanate (AUGMENTIN) 875-125 MG Tab          HPI: Patient previously seen and treated by ID team as inpatient during hospital admission.   Gage Garcia is a 47 y.o. male admitted 3/12/2023 for drainage and swelling left ankle  47-year-old male with a past medical history significant for chronic systolic congestive heart failure, hypertension, [facility]. S/p ORIF left lateral malleolus and internal fixation left syndesmosis on 2/4/2023 with subsequent I&D on 3/14/2023 by Dr. Persaud.  Post op Wound infection.  OR Cultures + E faecalis (ampicillin Sensitive) , Klebsiella oxytoca , Proteus (ampicillin sensitive), Bianca magna.  Plan at discharge was 6 weeks of IV  daptomycin +ceftriaxone end date 4/25/2023.      3/28/23-elevated CPK, attempted to change patient to Zosyn patient unable to manage pump at work.  We will continue with IV daptomycin and ceftriaxone until original end date with frequent monitoring of CPK levels.     03/31/23-CPK remains elevated at 617.  CPK levels to be checked twice weekly. Today Patient reports feeling well. Denies drainage, pungent odor, redness.  He still has some present swelling and mild discomfort in his left ankle when ambulating.  Denies feeling generally ill, fevers/chills, headache, n/v/d.  Tolerating IV antibiotics with no complaints of side effects. Denies bleeding, blistering, burning, coldness, discoloration of the skin, feeling of pressure, hives, infection, inflammation, itching, lumps, numbness, pain, rash, redness, soreness, stinging,  swelling, tenderness, tingling or muscle aches.     4/21/23- CPK remains elevated but stable to 287 4/20.  Labs from 4/17/2023 mostly unremarkable. Patient reports feeling good, denies drainage, purulent odor, redness from left ankle wound.  Denies feeling generally ill, fevers/chills, nausea vomiting or diarrhea.  Continues to tolerate IV antibiotics with no complaints of side effects.      Current Antimicrobials: daptomycin +ceftriaxone end date 4/25/2023.  Transition to p.o. Augmentin 875/125 mg twice daily for 6 months  Previous Antimicrobials: Unasyn    Other Current Medications:  Home Medications    Medication Sig Taking? Last Dose Authorizing Provider   amoxicillin-clavulanate (AUGMENTIN) 875-125 MG Tab Take 1 Tablet by mouth 2 times a day for 183 days. Yes  MARIANA AbrahamPAMOS   amLODIPine (NORVASC) 10 MG Tab Take 1 Tablet by mouth every day for 30 days. Yes Taking Juan Davalos M.D.   carvedilol (COREG) 25 MG Tab Take 1 Tablet by mouth 2 times a day with meals. Yes Taking Juan Davalos M.D.   hydrALAZINE (APRESOLINE) 100 MG tablet Take 1 Tablet by mouth every 8 hours. Yes Taking Juan Davalos M.D.   losartan (COZAAR) 100 MG Tab Take 1 Tablet by mouth every evening. Yes Taking Juan Davalos M.D.   isosorbide mononitrate SR (IMDUR) 60 MG TABLET SR 24 HR Take 1 Tablet by mouth every day. Yes Taking Juan Davalos M.D.   spironolactone (ALDACTONE) 50 MG Tab Take 1 Tablet by mouth every day. Yes Taking Juan Davalos M.D.   furosemide (LASIX) 20 MG Tab Take 1 Tablet by mouth 2 times a day for 30 days. Yes Taking Juan Davalos M.D.   ondansetron (ZOFRAN ODT) 4 MG TABLET DISPERSIBLE Take 1 Tablet by mouth every 6 hours as needed for Nausea/Vomiting for up to 30 days. Yes Taking Juan Davalos M.D.   senna-docusate (PERICOLACE OR SENOKOT S) 8.6-50 MG Tab Take 2 Tablets by mouth 2 times a day. Yes Taking Subani Zoltan, M.D.   polyethylene glycol/lytes (MIRALAX) 17 g Pack Mix and  drink 1 Packet by mouth 1 time a day as needed (if sennosides and docusate ineffective after 24 hours). Yes Taking Cari Charlton M.D.        PMH:  Past Medical History:   Diagnosis Date    Hypertension     Obesity     Smoker     Strep pharyngitis      Past Surgical History:   Procedure Laterality Date    IRRIGATION & DEBRIDEMENT GENERAL Left 3/14/2023    Procedure: IRRIGATION AND DEBRIDEMENT, LEFT ANKLE;  Surgeon: Jorge Persaud M.D.;  Location: SURGERY [facility];  Service: Orthopedics    ORIF, ANKLE Left 2023    Procedure: ORIF, ANKLE;  Surgeon: Jorge Persaud M.D.;  Location: SURGERY [facility];  Service: Orthopedics     Family History   Problem Relation Age of Onset    Heart Disease Mother     Diabetes Mother     Ovarian Cancer Neg Hx     Tubal Cancer Neg Hx     Breast Cancer Neg Hx     Colorectal Cancer Neg Hx     Peritoneal Cancer Neg Hx      Social History     Socioeconomic History    Marital status: Single     Spouse name: Not on file    Number of children: Not on file    Years of education: Not on file    Highest education level: Not on file   Occupational History    Not on file   Tobacco Use    Smoking status: Former     Packs/day: 1.50     Years: 22.00     Pack years: 33.00     Types: Cigarettes     Quit date: 3/8/2023     Years since quittin.1    Smokeless tobacco: Never   Vaping Use    Vaping Use: Never used   Substance and Sexual Activity    Alcohol use: Not Currently     Comment: occ    Drug use: Not Currently     Types: Inhaled, Marijuana     Comment: thc none since 3/11/23    Sexual activity: Yes   Other Topics Concern    Not on file   Social History Narrative    Not on file     Social Determinants of Health     Financial Resource Strain: Not on file   Food Insecurity: Not on file   Transportation Needs: Not on file   Physical Activity: Not on file   Stress: Not on file   Social Connections: Not on file   Intimate Partner Violence: Not on file   Housing Stability: Not on file       "     Allergies/Intolerances:  No Known Allergies    ROS:   Review of Systems   Constitutional:  Positive for malaise/fatigue. Negative for chills, fever and weight loss.        Weight gain   HENT:  Negative for congestion and hearing loss.    Eyes:  Negative for blurred vision and double vision.   Respiratory:  Negative for cough, sputum production, shortness of breath and wheezing.    Cardiovascular:  Negative for chest pain, palpitations and leg swelling (Left ankle swelling).   Gastrointestinal:  Negative for abdominal pain, diarrhea, nausea and vomiting.   Genitourinary:  Negative for dysuria.   Musculoskeletal:  Negative for back pain, joint pain, myalgias and neck pain.   Skin:  Negative for itching and rash.   Neurological:  Negative for dizziness, tingling, focal weakness and headaches.   Endo/Heme/Allergies:  Does not bruise/bleed easily.   Psychiatric/Behavioral:  The patient is not nervous/anxious.     ROS was reviewed and were negative except as above.    Objective    Most Recent Vital Signs:  Blood Pressure 124/88 (BP Location: Left arm, Patient Position: Sitting, BP Cuff Size: Large adult)   Pulse 85   Temperature 36.3 °C (97.4 °F) (Temporal)   Respiration 16   Height 2.083 m (6' 10\")   Weight (Abnormal) 175 kg (385 lb)   Oxygen Saturation 96%   Body Mass Index 40.26 kg/m²     Physical Exam:  Physical Exam  Vitals reviewed.   Constitutional:       Appearance: Normal appearance. He is obese.   HENT:      Head: Normocephalic and atraumatic.      Nose: Nose normal.      Mouth/Throat:      Mouth: Mucous membranes are moist.   Eyes:      Pupils: Pupils are equal, round, and reactive to light.   Cardiovascular:      Rate and Rhythm: Normal rate and regular rhythm.   Pulmonary:      Effort: Pulmonary effort is normal.      Breath sounds: Normal breath sounds.   Abdominal:      Palpations: Abdomen is soft.   Musculoskeletal:         General: Tenderness present.      Cervical back: Normal range of motion " and neck supple.      Comments: Left anterior ankle no erythema, swelling, drainage.  Scab  Left exterior ankle wound with no erythema, swelling, drainage.  Healing well from previous exam   Skin:     General: Skin is warm and dry.      Capillary Refill: Capillary refill takes less than 2 seconds.      Coloration: Skin is not jaundiced.      Findings: No erythema or rash.   Neurological:      Mental Status: He is alert and oriented to person, place, and time. Mental status is at baseline.      Motor: No weakness.      Coordination: Coordination normal.      Gait: Gait normal.   Psychiatric:         Mood and Affect: Mood normal.         Behavior: Behavior normal.        Pertinent Lab/Imaging Results:  [unfilled]  @CMP@  WBC   Date/Time Value Ref Range Status   04/17/2023 02:25 PM 9.8 4.8 - 10.8 K/uL Final     RBC   Date/Time Value Ref Range Status   04/17/2023 02:25 PM 5.82 4.70 - 6.10 M/uL Final     Hemoglobin   Date/Time Value Ref Range Status   04/17/2023 02:25 PM 15.5 14.0 - 18.0 g/dL Final     Hematocrit   Date/Time Value Ref Range Status   04/17/2023 02:25 PM 48.0 42.0 - 52.0 % Final     MCV   Date/Time Value Ref Range Status   04/17/2023 02:25 PM 82.5 81.4 - 97.8 fL Final     MCH   Date/Time Value Ref Range Status   04/17/2023 02:25 PM 26.6 (L) 27.0 - 33.0 pg Final     MCHC   Date/Time Value Ref Range Status   04/17/2023 02:25 PM 32.3 (L) 33.7 - 35.3 g/dL Final     MPV   Date/Time Value Ref Range Status   04/17/2023 02:25 PM 10.1 9.0 - 12.9 fL Final      Sodium   Date/Time Value Ref Range Status   04/17/2023 02:25  135 - 145 mmol/L Final     Potassium   Date/Time Value Ref Range Status   04/17/2023 02:25 PM 4.0 3.6 - 5.5 mmol/L Final     Chloride   Date/Time Value Ref Range Status   04/17/2023 02:25  96 - 112 mmol/L Final     Co2   Date/Time Value Ref Range Status   04/17/2023 02:25 PM 22 20 - 33 mmol/L Final     Glucose   Date/Time Value Ref Range Status   04/17/2023 02:25  (H) 65 - 99 mg/dL  Final     Bun   Date/Time Value Ref Range Status   04/17/2023 02:25 PM 22 8 - 22 mg/dL Final     Creatinine   Date/Time Value Ref Range Status   04/17/2023 02:25 PM 1.06 0.50 - 1.40 mg/dL Final     Bun-Creatinine Ratio   Date/Time Value Ref Range Status   07/05/2022 09:20 PM 20.8  Final     Alkaline Phosphatase   Date/Time Value Ref Range Status   04/17/2023 02:25 PM 74 30 - 99 U/L Final     AST(SGOT)   Date/Time Value Ref Range Status   04/17/2023 02:25 PM 20 12 - 45 U/L Final     Comment:     The hemolysis index of the specimen exceeds the allowed tolerance for the  test.  Result may be affected.  Specimen recollection is recommended to  confirm the result.       ALT(SGPT)   Date/Time Value Ref Range Status   04/17/2023 02:25 PM 22 2 - 50 U/L Final     Total Bilirubin   Date/Time Value Ref Range Status   04/17/2023 02:25 PM 0.2 0.1 - 1.5 mg/dL Final      CPK Total   Date/Time Value Ref Range Status   04/20/2023 02:15  (H) 0 - 154 U/L Final          No results found for: BLOODCULTU, BLDCULT, BCHOLD    No results found for: BLOODCULTU, BLDCULT, BCHOLD       CULTURE TISSUE W/ GRM STAIN  Order: [id number]  Status: Final result     Visible to patient: Yes (not seen)     Next appt: Today at 02:00 PM in Oncology (RN 3)     Specimen Information: Tissue   0 Result Notes      Component 2 wk ago   Significant Indicator POS Positive (POS)    Source TISS    Site Left Ankle    Culture Result - Abnormal     Gram Stain Result No organisms seen.    Culture Result  Abnormal   Proteus mirabilis   Rare growth     Resulting Agency M        Susceptibility     Proteus mirabilis     MURPHY     Ampicillin <=8 mcg/mL Sensitive     Ampicillin/sulbactam <=4/2 mcg/mL Sensitive     Cefazolin <=2 mcg/mL Sensitive     Cefepime <=2 mcg/mL Sensitive     Ceftriaxone <=1 mcg/mL Sensitive     Cefuroxime <=4 mcg/mL Sensitive     Ciprofloxacin <=0.25 mcg/mL Sensitive     Ertapenem <=0.5 mcg/mL Sensitive     Gentamicin <=2 mcg/mL Sensitive      Minocycline >8 mcg/mL Resistant     Moxifloxacin <=2 mcg/mL Sensitive     Pip/Tazobactam <=8 mcg/mL Sensitive     Tobramycin <=2 mcg/mL Sensitive     Trimeth/Sulfa <=0.5/9.5 m... Sensitive              Narrative          CULTURE WOUND W/ GRAM STAIN  Order: [id number]  Status: Final result     Visible to patient: Yes (not seen)     Next appt: Today at 02:00 PM in Oncology (RN 3)     Specimen Information: Wound   0 Result Notes      Component 2 wk ago   Significant Indicator POS Positive (POS)    Source WND    Site Left Ankle    Culture Result - Abnormal     Gram Stain Result Few WBCs.   No organisms seen.    Culture Result  Abnormal   Klebsiella oxytoca   Rare growth     Culture Result  Abnormal   Enterococcus faecalis   Rare growth     Resulting Agency M        Susceptibility     Klebsiella oxytoca Enterococcus faecalis     MURPHY MURPHY     Ampicillin   <=2 mcg/mL Sensitive     Ampicillin/sulbactam 8/4 mcg/mL Sensitive       Cefazolin 16 mcg/mL Resistant       Cefepime <=2 mcg/mL Sensitive       Ceftriaxone <=1 mcg/mL Sensitive       Cefuroxime <=4 mcg/mL Sensitive       Ciprofloxacin <=0.25 mcg/mL Sensitive       Daptomycin   1 mcg/mL Sensitive     Ertapenem <=0.5 mcg/mL Sensitive       Gent Synergy   <=500 mcg/mL Sensitive     Gentamicin <=2 mcg/mL Sensitive       Minocycline <=4 mcg/mL Sensitive       Moxifloxacin <=2 mcg/mL Sensitive       Penicillin   2 mcg/mL Sensitive     Pip/Tazobactam <=8 mcg/mL Sensitive       Tigecycline <=2 mcg/mL Sensitive       Tobramycin <=2 mcg/mL Sensitive       Trimeth/Sulfa <=0.5/9.5 m... Sensitive       Vancomycin   1 mcg/mL Sensitive                Narrative            Impression/Assessment      1. Postoperative wound infection        2. 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 Polymicrobial bacterial infection  amoxicillin-clavulanate (AUGMENTIN) 875-125 MG Tab        Gage Garcia is a 47 y.o. male  admitted 3/12/2023 for drainage and swelling left ankle  47-year-old male with a past medical history significant for chronic systolic congestive heart failure, hypertension, obesity transferred from outlying facility with a complaint of left ankle pain. S/p ORIF left lateral malleolus and internal fixation left syndesmosis on 2/4/2023 with subsequent I&D on 3/14/2023 by Dr. Persaud.  Post op Wound infection.  OR Cultures + E faecalis (ampicillin Sensitive) , Klebsiella oxytoca (ampicillin Sensitive), Proteus (ampicillin Sensitive), Bianca magna.  Plan at discharge was 6 weeks of IV  daptomycin +ceftriaxone end date 4/25/2023.  Then transition to oral augmentin for 6 months (end date 10/25/23).     PLAN:   - IV daptomycin +ceftriaxone end date 4/25/2023, transition to 6 months of p.o. Augmentin 875/125 mg twice daily.  Ok for PICC line to be removed after last infusion  - CPK labs biweekly.  CBC/CMP weekly  - Stop Lipitor as this could be increasing his CPK   - Continue routine follow-up with orthopedic surgery  -Signs and symptoms of worsening infection educated to patient and when to report to ER/call 911    Return visit:  2 weeks. Follow up with primary care physician for chronic medical problems      I have performed a physical exam,  updated ROS and plan today. I have reviewed previous images, labs, and provider notes.      JIMMIE Abraham.    All Patients should seek medical re-evaluation or report to the ER for new, increasing or worsening symptoms. In some circumstances medical conditions can change from the initial evaluation and may require emergent medical re-evaluation. This includes but is not limited to chest pain, shortness of breath, atypical abdominal pain, atypical headache, ALOC, fever >101, low blood pressure, high respiratory rate (above 30), low oxygen saturation (below 90%), acute delirium, abnormal bleeding, inability to tolerate any intake, weakness on one side of the body, any  worsened or concerning conditions.    Please note that this dictation was created using voice recognition software. I have worked with technical experts from [facility] to optimize the interface.  I have made every reasonable attempt to correct obvious errors, but there may be errors of grammar and possibly content that I did not discover before finalizing the note.

## 2023-04-20 NOTE — PROGRESS NOTES
Pt arrived to IS, ambulatory, for Dapto/Rocephin. Pt denies any muscle aching or cramping, reports ongoing fatigue. PICC line flushed per policy, positive blood return noted. CPK drawn per orders. Dapto and rocephin infused with no s/sx of adverse reaction. PICC line flushed per policy. Pt left IS with no s/sx of distress. Follow up appointment confirmed.

## 2023-04-21 ENCOUNTER — OUTPATIENT INFUSION SERVICES (OUTPATIENT)
Dept: ONCOLOGY | Facility: MEDICAL CENTER | Age: 48
End: 2023-04-21
Attending: INTERNAL MEDICINE
Payer: COMMERCIAL

## 2023-04-21 ENCOUNTER — OFFICE VISIT (OUTPATIENT)
Dept: INFECTIOUS DISEASES | Facility: MEDICAL CENTER | Age: 48
End: 2023-04-21
Attending: NURSE PRACTITIONER
Payer: COMMERCIAL

## 2023-04-21 VITALS
OXYGEN SATURATION: 96 % | RESPIRATION RATE: 18 BRPM | DIASTOLIC BLOOD PRESSURE: 86 MMHG | SYSTOLIC BLOOD PRESSURE: 157 MMHG | TEMPERATURE: 97.4 F | HEART RATE: 85 BPM

## 2023-04-21 VITALS
RESPIRATION RATE: 16 BRPM | TEMPERATURE: 97.4 F | SYSTOLIC BLOOD PRESSURE: 124 MMHG | OXYGEN SATURATION: 96 % | HEIGHT: 78 IN | HEART RATE: 85 BPM | WEIGHT: 315 LBS | BODY MASS INDEX: 36.45 KG/M2 | DIASTOLIC BLOOD PRESSURE: 88 MMHG

## 2023-04-21 DIAGNOSIS — A49.9 POLYMICROBIAL BACTERIAL INFECTION: ICD-10-CM

## 2023-04-21 DIAGNOSIS — T81.49XA POSTOPERATIVE WOUND INFECTION: ICD-10-CM

## 2023-04-21 DIAGNOSIS — Z96.662: ICD-10-CM

## 2023-04-21 DIAGNOSIS — T84.59XA: ICD-10-CM

## 2023-04-21 PROCEDURE — 99211 OFF/OP EST MAY X REQ PHY/QHP: CPT | Performed by: NURSE PRACTITIONER

## 2023-04-21 PROCEDURE — 700105 HCHG RX REV CODE 258: Performed by: INTERNAL MEDICINE

## 2023-04-21 PROCEDURE — 700111 HCHG RX REV CODE 636 W/ 250 OVERRIDE (IP): Performed by: INTERNAL MEDICINE

## 2023-04-21 PROCEDURE — 99214 OFFICE O/P EST MOD 30 MIN: CPT | Performed by: NURSE PRACTITIONER

## 2023-04-21 PROCEDURE — 96365 THER/PROPH/DIAG IV INF INIT: CPT

## 2023-04-21 PROCEDURE — 96368 THER/DIAG CONCURRENT INF: CPT

## 2023-04-21 RX ORDER — HEPARIN SODIUM (PORCINE) LOCK FLUSH IV SOLN 100 UNIT/ML 100 UNIT/ML
500 SOLUTION INTRAVENOUS PRN
Status: CANCELLED | OUTPATIENT
Start: 2023-04-22

## 2023-04-21 RX ORDER — AMOXICILLIN AND CLAVULANATE POTASSIUM 875; 125 MG/1; MG/1
1 TABLET, FILM COATED ORAL 2 TIMES DAILY
Qty: 366 TABLET | Refills: 0 | Status: SHIPPED | OUTPATIENT
Start: 2023-04-25 | End: 2023-10-25

## 2023-04-21 RX ORDER — 0.9 % SODIUM CHLORIDE 0.9 %
10 VIAL (ML) INJECTION PRN
Status: CANCELLED | OUTPATIENT
Start: 2023-04-22

## 2023-04-21 RX ORDER — 0.9 % SODIUM CHLORIDE 0.9 %
3 VIAL (ML) INJECTION PRN
Status: CANCELLED | OUTPATIENT
Start: 2023-04-22

## 2023-04-21 RX ORDER — 0.9 % SODIUM CHLORIDE 0.9 %
VIAL (ML) INJECTION PRN
Status: CANCELLED | OUTPATIENT
Start: 2023-04-22

## 2023-04-21 RX ADMIN — CEFTRIAXONE SODIUM 2 G: 2 INJECTION, POWDER, FOR SOLUTION INTRAMUSCULAR; INTRAVENOUS at 14:13

## 2023-04-21 RX ADMIN — DAPTOMYCIN 1000 MG: 500 INJECTION, POWDER, LYOPHILIZED, FOR SOLUTION INTRAVENOUS at 14:13

## 2023-04-21 ASSESSMENT — FIBROSIS 4 INDEX: FIB4 SCORE: 0.91

## 2023-04-21 ASSESSMENT — ENCOUNTER SYMPTOMS
FOCAL WEAKNESS: 0
TINGLING: 0

## 2023-04-21 NOTE — PROGRESS NOTES
Patient to [facility] for Dapto/Rocephin infusion. PICC line dressing changed with sterile field, flushed with + blood return. Dapto/Rocephin infused simultaneously, no s/s of infusion reaction. PICC line flushed with + blood return. Patient to home in care of self.

## 2023-04-22 ENCOUNTER — OUTPATIENT INFUSION SERVICES (OUTPATIENT)
Dept: ONCOLOGY | Facility: MEDICAL CENTER | Age: 48
End: 2023-04-22
Attending: INTERNAL MEDICINE
Payer: COMMERCIAL

## 2023-04-22 VITALS
SYSTOLIC BLOOD PRESSURE: 143 MMHG | DIASTOLIC BLOOD PRESSURE: 84 MMHG | RESPIRATION RATE: 18 BRPM | OXYGEN SATURATION: 96 % | TEMPERATURE: 97.2 F | HEART RATE: 86 BPM

## 2023-04-22 DIAGNOSIS — T81.49XA POSTOPERATIVE WOUND INFECTION: ICD-10-CM

## 2023-04-22 PROCEDURE — 96365 THER/PROPH/DIAG IV INF INIT: CPT

## 2023-04-22 PROCEDURE — 96368 THER/DIAG CONCURRENT INF: CPT

## 2023-04-22 PROCEDURE — 700105 HCHG RX REV CODE 258: Performed by: INTERNAL MEDICINE

## 2023-04-22 PROCEDURE — 700111 HCHG RX REV CODE 636 W/ 250 OVERRIDE (IP): Performed by: INTERNAL MEDICINE

## 2023-04-22 RX ORDER — 0.9 % SODIUM CHLORIDE 0.9 %
VIAL (ML) INJECTION PRN
Status: CANCELLED | OUTPATIENT
Start: 2023-04-23

## 2023-04-22 RX ORDER — 0.9 % SODIUM CHLORIDE 0.9 %
3 VIAL (ML) INJECTION PRN
Status: CANCELLED | OUTPATIENT
Start: 2023-04-23

## 2023-04-22 RX ORDER — HEPARIN SODIUM (PORCINE) LOCK FLUSH IV SOLN 100 UNIT/ML 100 UNIT/ML
500 SOLUTION INTRAVENOUS PRN
Status: CANCELLED | OUTPATIENT
Start: 2023-04-23

## 2023-04-22 RX ORDER — 0.9 % SODIUM CHLORIDE 0.9 %
10 VIAL (ML) INJECTION PRN
Status: CANCELLED | OUTPATIENT
Start: 2023-04-23

## 2023-04-22 RX ADMIN — DAPTOMYCIN 1000 MG: 500 INJECTION, POWDER, LYOPHILIZED, FOR SOLUTION INTRAVENOUS at 14:16

## 2023-04-22 RX ADMIN — CEFTRIAXONE SODIUM 2 G: 2 INJECTION, POWDER, FOR SOLUTION INTRAMUSCULAR; INTRAVENOUS at 14:30

## 2023-04-22 NOTE — PROGRESS NOTES
Pt arrived ambulatory to [facility] for daily antibiotic infusions. POC discussed with pt and he agrees with plan.    PICC with brisk blood return, flushed with NS. Pt medicated per MAR. Pt tolerated treatment without s/s adverse reaction. PICC flushed with NS, wrapped with gauze and mesh sleeve. Pt discharged to self care, NAD. Pt to return tomorrow.

## 2023-04-23 ENCOUNTER — OUTPATIENT INFUSION SERVICES (OUTPATIENT)
Dept: ONCOLOGY | Facility: MEDICAL CENTER | Age: 48
End: 2023-04-23
Attending: INTERNAL MEDICINE
Payer: COMMERCIAL

## 2023-04-23 VITALS
RESPIRATION RATE: 20 BRPM | SYSTOLIC BLOOD PRESSURE: 115 MMHG | OXYGEN SATURATION: 97 % | DIASTOLIC BLOOD PRESSURE: 77 MMHG | TEMPERATURE: 97 F | HEART RATE: 83 BPM

## 2023-04-23 DIAGNOSIS — T81.49XA POSTOPERATIVE WOUND INFECTION: ICD-10-CM

## 2023-04-23 PROCEDURE — 700105 HCHG RX REV CODE 258: Performed by: INTERNAL MEDICINE

## 2023-04-23 PROCEDURE — 96365 THER/PROPH/DIAG IV INF INIT: CPT

## 2023-04-23 PROCEDURE — 96368 THER/DIAG CONCURRENT INF: CPT

## 2023-04-23 PROCEDURE — 700111 HCHG RX REV CODE 636 W/ 250 OVERRIDE (IP): Performed by: INTERNAL MEDICINE

## 2023-04-23 RX ORDER — 0.9 % SODIUM CHLORIDE 0.9 %
VIAL (ML) INJECTION PRN
Status: CANCELLED | OUTPATIENT
Start: 2023-04-24

## 2023-04-23 RX ORDER — HEPARIN SODIUM (PORCINE) LOCK FLUSH IV SOLN 100 UNIT/ML 100 UNIT/ML
500 SOLUTION INTRAVENOUS PRN
Status: CANCELLED | OUTPATIENT
Start: 2023-04-24

## 2023-04-23 RX ORDER — 0.9 % SODIUM CHLORIDE 0.9 %
10 VIAL (ML) INJECTION PRN
Status: CANCELLED | OUTPATIENT
Start: 2023-04-24

## 2023-04-23 RX ORDER — 0.9 % SODIUM CHLORIDE 0.9 %
3 VIAL (ML) INJECTION PRN
Status: CANCELLED | OUTPATIENT
Start: 2023-04-24

## 2023-04-23 RX ADMIN — DAPTOMYCIN 1000 MG: 500 INJECTION, POWDER, LYOPHILIZED, FOR SOLUTION INTRAVENOUS at 14:10

## 2023-04-23 RX ADMIN — CEFTRIAXONE SODIUM 2 G: 2 INJECTION, POWDER, FOR SOLUTION INTRAMUSCULAR; INTRAVENOUS at 14:10

## 2023-04-24 ENCOUNTER — OUTPATIENT INFUSION SERVICES (OUTPATIENT)
Dept: ONCOLOGY | Facility: MEDICAL CENTER | Age: 48
End: 2023-04-24
Attending: INTERNAL MEDICINE
Payer: COMMERCIAL

## 2023-04-24 VITALS
DIASTOLIC BLOOD PRESSURE: 76 MMHG | RESPIRATION RATE: 19 BRPM | OXYGEN SATURATION: 96 % | HEART RATE: 80 BPM | WEIGHT: 315 LBS | BODY MASS INDEX: 36.45 KG/M2 | HEIGHT: 78 IN | TEMPERATURE: 97.1 F | SYSTOLIC BLOOD PRESSURE: 135 MMHG

## 2023-04-24 DIAGNOSIS — T81.49XA POSTOPERATIVE WOUND INFECTION: ICD-10-CM

## 2023-04-24 LAB
ALBUMIN SERPL BCP-MCNC: 4 G/DL (ref 3.2–4.9)
ALBUMIN/GLOB SERPL: 1.3 G/DL
ALP SERPL-CCNC: 64 U/L (ref 30–99)
ALT SERPL-CCNC: 20 U/L (ref 2–50)
ANION GAP SERPL CALC-SCNC: 11 MMOL/L (ref 7–16)
AST SERPL-CCNC: 18 U/L (ref 12–45)
BASOPHILS # BLD AUTO: 0.6 % (ref 0–1.8)
BASOPHILS # BLD: 0.05 K/UL (ref 0–0.12)
BILIRUB SERPL-MCNC: 0.7 MG/DL (ref 0.1–1.5)
BUN SERPL-MCNC: 13 MG/DL (ref 8–22)
CALCIUM ALBUM COR SERPL-MCNC: 8.7 MG/DL (ref 8.5–10.5)
CALCIUM SERPL-MCNC: 8.7 MG/DL (ref 8.5–10.5)
CHLORIDE SERPL-SCNC: 107 MMOL/L (ref 96–112)
CK SERPL-CCNC: 242 U/L (ref 0–154)
CO2 SERPL-SCNC: 23 MMOL/L (ref 20–33)
CREAT SERPL-MCNC: 0.95 MG/DL (ref 0.5–1.4)
EOSINOPHIL # BLD AUTO: 0.31 K/UL (ref 0–0.51)
EOSINOPHIL NFR BLD: 3.9 % (ref 0–6.9)
ERYTHROCYTE [DISTWIDTH] IN BLOOD BY AUTOMATED COUNT: 42.6 FL (ref 35.9–50)
GFR SERPLBLD CREATININE-BSD FMLA CKD-EPI: 99 ML/MIN/1.73 M 2
GLOBULIN SER CALC-MCNC: 3.1 G/DL (ref 1.9–3.5)
GLUCOSE SERPL-MCNC: 108 MG/DL (ref 65–99)
HCT VFR BLD AUTO: 45.5 % (ref 42–52)
HGB BLD-MCNC: 14.8 G/DL (ref 14–18)
IMM GRANULOCYTES # BLD AUTO: 0.04 K/UL (ref 0–0.11)
IMM GRANULOCYTES NFR BLD AUTO: 0.5 % (ref 0–0.9)
LYMPHOCYTES # BLD AUTO: 1.58 K/UL (ref 1–4.8)
LYMPHOCYTES NFR BLD: 20.1 % (ref 22–41)
MCH RBC QN AUTO: 26.3 PG (ref 27–33)
MCHC RBC AUTO-ENTMCNC: 32.5 G/DL (ref 33.7–35.3)
MCV RBC AUTO: 81 FL (ref 81.4–97.8)
MONOCYTES # BLD AUTO: 0.61 K/UL (ref 0–0.85)
MONOCYTES NFR BLD AUTO: 7.8 % (ref 0–13.4)
NEUTROPHILS # BLD AUTO: 5.27 K/UL (ref 1.82–7.42)
NEUTROPHILS NFR BLD: 67.1 % (ref 44–72)
NRBC # BLD AUTO: 0 K/UL
NRBC BLD-RTO: 0 /100 WBC
OUTPT INFUS CBC COMMENT OICOM: ABNORMAL
PLATELET # BLD AUTO: 201 K/UL (ref 164–446)
PMV BLD AUTO: 9.7 FL (ref 9–12.9)
POTASSIUM SERPL-SCNC: 3.6 MMOL/L (ref 3.6–5.5)
PROT SERPL-MCNC: 7.1 G/DL (ref 6–8.2)
RBC # BLD AUTO: 5.62 M/UL (ref 4.7–6.1)
SODIUM SERPL-SCNC: 141 MMOL/L (ref 135–145)
WBC # BLD AUTO: 7.9 K/UL (ref 4.8–10.8)

## 2023-04-24 PROCEDURE — 700111 HCHG RX REV CODE 636 W/ 250 OVERRIDE (IP): Performed by: INTERNAL MEDICINE

## 2023-04-24 PROCEDURE — 85025 COMPLETE CBC W/AUTO DIFF WBC: CPT

## 2023-04-24 PROCEDURE — 96368 THER/DIAG CONCURRENT INF: CPT

## 2023-04-24 PROCEDURE — 96375 TX/PRO/DX INJ NEW DRUG ADDON: CPT

## 2023-04-24 PROCEDURE — 96374 THER/PROPH/DIAG INJ IV PUSH: CPT

## 2023-04-24 PROCEDURE — 80053 COMPREHEN METABOLIC PANEL: CPT

## 2023-04-24 PROCEDURE — 700105 HCHG RX REV CODE 258: Performed by: INTERNAL MEDICINE

## 2023-04-24 PROCEDURE — 82550 ASSAY OF CK (CPK): CPT

## 2023-04-24 RX ORDER — 0.9 % SODIUM CHLORIDE 0.9 %
10 VIAL (ML) INJECTION PRN
Status: CANCELLED | OUTPATIENT
Start: 2023-04-25

## 2023-04-24 RX ORDER — 0.9 % SODIUM CHLORIDE 0.9 %
3 VIAL (ML) INJECTION PRN
Status: CANCELLED | OUTPATIENT
Start: 2023-04-25

## 2023-04-24 RX ORDER — HEPARIN SODIUM (PORCINE) LOCK FLUSH IV SOLN 100 UNIT/ML 100 UNIT/ML
500 SOLUTION INTRAVENOUS PRN
Status: CANCELLED | OUTPATIENT
Start: 2023-04-25

## 2023-04-24 RX ORDER — 0.9 % SODIUM CHLORIDE 0.9 %
VIAL (ML) INJECTION PRN
Status: CANCELLED | OUTPATIENT
Start: 2023-04-25

## 2023-04-24 RX ADMIN — CEFTRIAXONE SODIUM 2 G: 2 INJECTION, POWDER, FOR SOLUTION INTRAMUSCULAR; INTRAVENOUS at 14:16

## 2023-04-24 RX ADMIN — DAPTOMYCIN 1000 MG: 500 INJECTION, POWDER, LYOPHILIZED, FOR SOLUTION INTRAVENOUS at 14:15

## 2023-04-24 ASSESSMENT — FIBROSIS 4 INDEX: FIB4 SCORE: 0.91

## 2023-04-24 NOTE — PROGRESS NOTES
Pt presents to [facility] for daily ceftriaxone and daptomycin. R PICC line in place; brisk blood return observed and pt tolerated well. Ceftriaxone and daptomycin infused with no s/s of adverse reactions. Brisk blood return observed from PICC line before flushed and wrapped in gauze for tomorrow. Next appointment confirmed and education provided. Pt discharged to self care with all personal belongings and in NAD.

## 2023-04-24 NOTE — PROGRESS NOTES
Pt arrived ambulatory and stable for daily ceftriaxone and daptomycin. R PICC line in place; brisk blood return observed lans drawn today.  Ceftriaxone and daptomycin infused with per mar.  Pt tolerated it well.  Reinforced dressing, does not want dressing change, is finished with IV antibiotics tomorrow.  PICC saline locked, pt left clinic stable.

## 2023-04-25 ENCOUNTER — OUTPATIENT INFUSION SERVICES (OUTPATIENT)
Dept: ONCOLOGY | Facility: MEDICAL CENTER | Age: 48
End: 2023-04-25
Attending: INTERNAL MEDICINE
Payer: COMMERCIAL

## 2023-04-25 VITALS
TEMPERATURE: 96.5 F | SYSTOLIC BLOOD PRESSURE: 134 MMHG | DIASTOLIC BLOOD PRESSURE: 76 MMHG | OXYGEN SATURATION: 92 % | HEART RATE: 80 BPM | RESPIRATION RATE: 18 BRPM

## 2023-04-25 DIAGNOSIS — T81.49XA POSTOPERATIVE WOUND INFECTION: ICD-10-CM

## 2023-04-25 PROCEDURE — 96365 THER/PROPH/DIAG IV INF INIT: CPT

## 2023-04-25 PROCEDURE — 96368 THER/DIAG CONCURRENT INF: CPT

## 2023-04-25 PROCEDURE — 700111 HCHG RX REV CODE 636 W/ 250 OVERRIDE (IP): Performed by: INTERNAL MEDICINE

## 2023-04-25 PROCEDURE — 700105 HCHG RX REV CODE 258: Performed by: INTERNAL MEDICINE

## 2023-04-25 RX ORDER — 0.9 % SODIUM CHLORIDE 0.9 %
3 VIAL (ML) INJECTION PRN
Status: CANCELLED | OUTPATIENT
Start: 2023-05-01

## 2023-04-25 RX ORDER — 0.9 % SODIUM CHLORIDE 0.9 %
10 VIAL (ML) INJECTION PRN
Status: CANCELLED | OUTPATIENT
Start: 2023-05-01

## 2023-04-25 RX ORDER — HEPARIN SODIUM (PORCINE) LOCK FLUSH IV SOLN 100 UNIT/ML 100 UNIT/ML
500 SOLUTION INTRAVENOUS PRN
Status: CANCELLED | OUTPATIENT
Start: 2023-05-01

## 2023-04-25 RX ORDER — 0.9 % SODIUM CHLORIDE 0.9 %
VIAL (ML) INJECTION PRN
Status: CANCELLED | OUTPATIENT
Start: 2023-05-01

## 2023-04-25 RX ADMIN — DAPTOMYCIN 1000 MG: 500 INJECTION, POWDER, LYOPHILIZED, FOR SOLUTION INTRAVENOUS at 14:12

## 2023-04-25 RX ADMIN — CEFTRIAXONE SODIUM 2 G: 2 INJECTION, POWDER, FOR SOLUTION INTRAMUSCULAR; INTRAVENOUS at 14:12

## 2023-04-26 ENCOUNTER — APPOINTMENT (OUTPATIENT)
Dept: ONCOLOGY | Facility: MEDICAL CENTER | Age: 48
End: 2023-04-26
Payer: COMMERCIAL

## 2023-04-26 LAB
MYCOBACTERIUM SPEC CULT: NORMAL
RHODAMINE-AURAMINE STN SPEC: NORMAL
SIGNIFICANT IND 70042: NORMAL
SITE SITE: NORMAL
SOURCE SOURCE: NORMAL

## 2023-04-26 NOTE — PROGRESS NOTES
Pt to IS for last dose of daily dapto/cef. POC discussed and pt in agreement. Pt denies fever, muscle pain, weakness, cramps, dark urine, tingling/numbness or balance problems today. PICC line flushed with brisk blood return noted, dressing changed per [facility] policy and pt tolerated well. Antibiotics administered per MAR and pt tolerated well. PICC line flushed, dressing removed and PICC line pulled per provider orders on treatment day. Pressure dressing applied and pt remained seated for 30 mins for observation. No s/s of bleeding noted and pt given instructions for dressing removal at home. Pt verbalized understanding and discharged ambulatory in [facility]. Pt reports he will continue following up with ordering provider prn.

## 2023-05-17 DIAGNOSIS — I50.22 CHRONIC SYSTOLIC CHF (CONGESTIVE HEART FAILURE) (HCC): ICD-10-CM

## 2023-05-17 RX ORDER — HYDRALAZINE HYDROCHLORIDE 100 MG/1
100 TABLET, FILM COATED ORAL EVERY 8 HOURS
Qty: 90 TABLET | Refills: 0 | Status: SHIPPED | OUTPATIENT
Start: 2023-05-17 | End: 2023-05-17 | Stop reason: SDUPTHER

## 2023-05-17 RX ORDER — AMLODIPINE BESYLATE 10 MG/1
TABLET ORAL
Qty: 30 TABLET | Refills: 0 | Status: SHIPPED | OUTPATIENT
Start: 2023-05-17 | End: 2023-05-17 | Stop reason: SDUPTHER

## 2023-05-17 RX ORDER — CARVEDILOL 25 MG/1
25 TABLET ORAL 2 TIMES DAILY WITH MEALS
Qty: 180 TABLET | Refills: 3 | Status: SHIPPED | OUTPATIENT
Start: 2023-05-17

## 2023-05-17 RX ORDER — LOSARTAN POTASSIUM 100 MG/1
100 TABLET ORAL EVERY EVENING
Qty: 90 TABLET | Refills: 3 | Status: SHIPPED | OUTPATIENT
Start: 2023-05-17

## 2023-05-17 RX ORDER — ISOSORBIDE MONONITRATE 60 MG/1
60 TABLET, EXTENDED RELEASE ORAL DAILY
Qty: 30 TABLET | Refills: 0 | Status: SHIPPED | OUTPATIENT
Start: 2023-05-17 | End: 2023-05-17 | Stop reason: SDUPTHER

## 2023-05-17 RX ORDER — AMLODIPINE BESYLATE 10 MG/1
10 TABLET ORAL DAILY
Qty: 90 TABLET | Refills: 3 | Status: SHIPPED | OUTPATIENT
Start: 2023-05-17

## 2023-05-17 RX ORDER — HYDRALAZINE HYDROCHLORIDE 100 MG/1
100 TABLET, FILM COATED ORAL EVERY 8 HOURS
Qty: 270 TABLET | Refills: 3 | Status: SHIPPED | OUTPATIENT
Start: 2023-05-17

## 2023-05-17 RX ORDER — CARVEDILOL 25 MG/1
TABLET ORAL
Qty: 60 TABLET | Refills: 0 | Status: SHIPPED | OUTPATIENT
Start: 2023-05-17 | End: 2023-05-17 | Stop reason: SDUPTHER

## 2023-05-17 RX ORDER — LOSARTAN POTASSIUM 100 MG/1
100 TABLET ORAL EVERY EVENING
Qty: 30 TABLET | Refills: 0 | Status: SHIPPED | OUTPATIENT
Start: 2023-05-17 | End: 2023-05-17 | Stop reason: SDUPTHER

## 2023-05-17 RX ORDER — ISOSORBIDE MONONITRATE 60 MG/1
60 TABLET, EXTENDED RELEASE ORAL DAILY
Qty: 90 TABLET | Refills: 3 | Status: SHIPPED | OUTPATIENT
Start: 2023-05-17 | End: 2023-10-18

## 2023-05-17 NOTE — DISCHARGE SUMMARY
Discharge Summary    CHIEF COMPLAINT ON ADMISSION  Chief Complaint   Patient presents with    Extremity Fracture     LLE OPEN FX       Reason for Admission  EMS     Admission Date  2/4/2023    CODE STATUS  Full Code    HPI & HOSPITAL COURSE    This a 47-year-old male with a past medical history significant for chronic systolic congestive heart failure, hypertension, obesity transferred from outlying facility with a complaint of left ankle pain.  He is noted to have open ankle fracture, received conscious sedation for reduction in ER.  Orthopedic surgery was consulted, patient underwent  Open treatment of left lateral malleolus ankle fracture with internal fixation; Open repair left syndesmosis with internal fixation;  Irrigation and debridement open fracture with Dr. Burleson on 2/4/2023.  Per orthopedic surgery, patient should be toe-touch weight bearing on the operative extremity.  PT OT recommended home health for further therapy.  Patient lacks insurance and does not have PCP.  He will follow-up with PCP on 2/13/2022 for home health.  Appointment set up by .  Physical medicine evaluated recommend DC home with outpatient therapies.  Front wheel walker delivered.  Echogram noted with incessant fraction 40% with grade 1 diastolic dysfunction which is improved compared to previous echocardiogram.      Patient to follow-up with cardiology regarding his chronic heart failure.  Counseled not to use any illicit drugs.    During hospital course patient remain  hemodynamically stable ,asymptomatic ,labs remain unremarkable .  Patient will be discharged with close follow up with PCP .  Advised for medicine compliance.Discharge plan was discussed with patient in details .  Patient agreed with discharge plan  and  all questions answered.        Therefore, he is discharged in good and stable condition to home with organized home healthcare and close outpatient follow-up.    The patient met 2-midnight criteria for  Patient and responsible adult given discharge instructions with no questions regarding instructions. Ashwini score 20/20. Pain level 0/10.  Discharged from unit via ambulation. Patient discharged to home with friend Aleena.       an inpatient stay at the time of discharge.    Discharge Date  2/9/2023          DISCHARGE DIAGNOSES  Principal Problem:    Ankle fracture, left, open type I or II, initial encounter POA: Yes  Active Problems:    Hypertension POA: Yes    Morbid obesity (HCC) POA: Yes    Chronic systolic CHF (congestive heart failure) (HCC) POA: Unknown    Hypokalemia POA: Unknown    Alcohol consumption binge drinking POA: Unknown    Dependence on nicotine from cigarettes POA: Unknown    Advance care planning POA: Unknown    Fever POA: Unknown    Pseudogout POA: Unknown  Resolved Problems:    Smoker POA: Yes      FOLLOW UP  Future Appointments   Date Time Provider Department Center   2/13/2023 10:00 AM Juan Davalos M.D. [facility] DAVI Persaud M.D.  [address] [facility] [address]  [phone]    Follow up in 2 week(s)        MEDICATIONS ON DISCHARGE     Medication List        START taking these medications        Instructions   amLODIPine 10 MG Tabs  Start taking on: February 10, 2023  Commonly known as: NORVASC   Take 1 Tablet by mouth every day for 30 days.  Dose: 10 mg     carvedilol 12.5 MG Tabs  Commonly known as: COREG   Take 2 Tablets by mouth 2 times a day with meals for 30 days.  Dose: 25 mg     isosorbide mononitrate SR 30 MG Tb24  Start taking on: February 10, 2023  Commonly known as: IMDUR   Take 1 Tablet by mouth every day for 30 days.  Dose: 30 mg     predniSONE 10 MG Tabs  Start taking on: February 9, 2023  Commonly known as: DELTASONE   Take 4 Tablets by mouth every day for 3 days, THEN 3 Tablets every day for 3 days, THEN 2 Tablets every day for 3 days, THEN 1 Tablet every day for 3 days, THEN 0.5 Tablets every day for 3 days.            CHANGE how you take these medications        Instructions   lisinopril 20 MG Tabs  What changed:   when to take this  Another medication with the same name was removed. Continue taking this medication, and follow the directions you see here.  Commonly  known as: PRINIVIL   Take 1 Tablet by mouth 2 times a day for 30 days.  Dose: 20 mg     oxyCODONE immediate-release 5 MG Tabs  What changed: additional instructions  Commonly known as: ROXICODONE   Take 1 Tablet by mouth every four hours as needed for Severe Pain for up to 5 days.  Dose: 5 mg            CONTINUE taking these medications        Instructions   atorvastatin 10 MG Tabs  Commonly known as: LIPITOR   Take 10 mg by mouth every evening.  Dose: 10 mg     furosemide 20 MG Tabs  Commonly known as: LASIX   Take 1 Tablet by mouth 2 times a day.  Dose: 20 mg     spironolactone 25 MG Tabs  Start taking on: February 10, 2023  Commonly known as: ALDACTONE   Take 1 Tablet by mouth every day for 30 days.  Dose: 25 mg            STOP taking these medications      metoprolol SR 25 MG Tb24  Commonly known as: TOPROL XL              Allergies  No Known Allergies    DIET  Orders Placed This Encounter   Procedures    Diet Order Diet: Cardiac     Standing Status:   Standing     Number of Occurrences:   1     Order Specific Question:   Diet:     Answer:   Cardiac [6]       ACTIVITY  As tolerated.  Weight bearing as tolerated    CONSULTATIONS  Ortho    PROCEDURES  DX-KNEE 2- LEFT   Final Result      1.  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   2.  Knee effusion.   3.  No fracture or dislocation.      DX-CHEST-LIMITED (1 VIEW)   Final Result      No acute cardiopulmonary disease evident.      EC-ECHOCARDIOGRAM COMPLETE W/O CONT   Final Result      DX-ANKLE 2- VIEWS LEFT   Final Result      Intraoperative fluoroscopic spot images as described above.      DX-PORTABLE FLUORO > 1 HOUR   Final Result      Intraoperative fluoroscopic spot images as described above.      DX-FOOT-2- LEFT   Final Result         1.  Comminuted fibular metaphyseal fracture and medial malleolus fracture with lateral dislocation of the ankle mortise joint.   2.  Soft tissue gas in the medial ankle,  compatible with open fracture.      DX-CHEST-PORTABLE (1 VIEW)   Final Result         1.  No acute cardiopulmonary disease.      DX-ANKLE 3+ VIEWS LEFT   Final Result         1.  Comminuted fibular metaphyseal fracture and medial malleolus fracture with lateral dislocation of the ankle mortise joint.   2.  Soft tissue gas in the medial ankle, compatible with open fracture.      DX-TIBIA AND FIBULA LEFT   Final Result         1.  Comminuted fibular metaphyseal fracture and medial malleolus fracture with lateral dislocation of the ankle mortise joint.   2.  Soft tissue gas in the medial ankle, compatible with open fracture.   3.  Corticated bony fragment near the insertion of the patellar tendon, could be related to prior Osgood Schlatter.            LABORATORY  Lab Results   Component Value Date    SODIUM 137 02/07/2023    POTASSIUM 5.2 02/07/2023    CHLORIDE 102 02/07/2023    CO2 27 02/07/2023    GLUCOSE 133 (H) 02/07/2023    BUN 29 (H) 02/07/2023    CREATININE 1.22 02/07/2023        Lab Results   Component Value Date    WBC 10.8 02/07/2023    HEMOGLOBIN 14.6 02/07/2023    HEMATOCRIT 42.8 02/07/2023    PLATELETCT 177 02/07/2023        Total time of the discharge process exceeds 37 minutes.

## 2023-06-12 PROBLEM — S82.892B OPEN FRACTURE OF LEFT ANKLE: Status: RESOLVED | Noted: 2023-02-04 | Resolved: 2023-06-12

## 2023-08-07 NOTE — ED PROVIDER NOTES
.ED Provider Note    CHIEF COMPLAINT  Chief Complaint   Patient presents with    Dizziness     Woke up this am feeling dizzy describes as going to pass out. Has hx of CHF w/30% EF    Blood Pressure Problem     Checked BP this am when went to [id number]    Headache     Since yesterday       HPI  Gage Garcia is a 46 y.o. male who presents to the emergency department with chief complaint of just feeling kind of lightheaded and having a headache.  The patient states he has a history of congestive heart failure and states he been compliant with all of his medications including the low-salt diet however his debit card got hacked and he had no money he was only able to eat what was in his cupboards which is basely to stop running which is.  That was about 3 days ago.  So states been pretty terrible the last few days.  He states he woke up with feeling hot and dizzy he went to a convenience store and found his blood pressure to be extremely elevated.  He states that currently all of his symptoms has resolved he is no longer feeling lightheaded he is not having a headache anymore but he never at any time had chest pain shortness of breath or unilateral bilateral leg swelling.    REVIEW OF SYSTEMS  Positives as above. Pertinent negatives include weakness numbness tingling vision changes speech difficulty chest pain shortness of breath dyspnea on exertion unilateral bilateral leg swelling easy bleeding or bruising.  All other review of systems are negative    PAST MEDICAL HISTORY   has a past medical history of Hypertension, Obesity, Smoker, and Strep pharyngitis.    SOCIAL HISTORY  Social History     Tobacco Use    Smoking status: Every Day     Packs/day: 1.00     Types: Cigarettes    Smokeless tobacco: Never   Vaping Use    Vaping Use: Every day    Substances: Nicotine, THC, CBD   Substance and Sexual Activity    Alcohol use: Yes     Alcohol/week: 1.2 oz     Types: 2 Shots of liquor per week    Drug use: Yes      "Types: Inhaled, Marijuana     Comment: thc    Sexual activity: Yes       SURGICAL HISTORY  patient denies any surgical history    CURRENT MEDICATIONS  Home Medications       Reviewed by Joanna Velazquez R.N. (Registered Nurse) on 08/24/22 at 1655  Med List Status: Partial     Medication Last Dose Status   aspirin (ASA) 81 MG Chew Tab chewable tablet  Active   atorvastatin (LIPITOR) 10 MG Tab  Active   atorvastatin (LIPITOR) 20 MG Tab  Active   furosemide (LASIX) 20 MG Tab  Active   furosemide (LASIX) 20 MG Tab  Active   lisinopril (PRINIVIL) 20 MG Tab  Active   lisinopril (PRINIVIL) 20 MG Tab  Active   metoprolol tartrate (LOPRESSOR) 25 MG Tab  Active   metoprolol tartrate (LOPRESSOR) 25 MG Tab  Active   spironolactone (ALDACTONE) 25 MG Tab  Active   spironolactone (ALDACTONE) 25 MG Tab  Active                    ALLERGIES  No Known Allergies    PHYSICAL EXAM  VITAL SIGNS: BP (!) 182/110   Pulse 94   Temp 36.7 °C (98 °F) (Temporal)   Resp 18   Ht 2.083 m (6' 10\")   Wt (!) 156 kg (344 lb 5.7 oz)   SpO2 98%   BMI 36.01 kg/m²    Pulse ox interpretation: I interpret this pulse ox as normal.  Constitutional: Alert in no apparent distress.  HENT: Normocephalic atraumatic, MMM  Eyes: PER, Conjunctiva normal, Non-icteric.   Neck: Normal range of motion, No tenderness, Supple, No stridor.   Cardiovascular: Regular rate and rhythm, no murmurs.   Thorax & Lungs: Normal breath sounds, No respiratory distress, No wheezing, No chest tenderness.   Abdomen: Bowel sounds normal, Soft, No tenderness, No pulsatile masses. No peritoneal signs.  Skin: Warm, Dry, No erythema, No rash.   Back: No bony tenderness, No CVA tenderness.   Extremities/MSK: Intact equal distal pulses, No edema, No tenderness, No cyanosis, no major deformities noted  Neurologic: Alert and oriented x3, Symmetric smile, eyes shut tight bilaterally, forehead wrinkles bilaterally, sensation intact to light touch bilateral face, tongue midline, head turn and " shoulder shrug with full strength. Hearing intact grossly bilaterally. 5/5  strength bilaterally, 5/5 tricep and bicep strength bilaterally. Sensation intact to light touch r, m, u, axillary nerves bilaterally. 5/5 strength quadricep, plantarflexion/dorsiflexion/extensor hallicus longus bilaterally. Sensation intact to light touch bilateral lower extremities in all nerve distributions.  Intact finger-to-nose, normal steady gait         DIFFERENTIAL DIAGNOSIS AND WORK UP PLAN    This is a 46 y.o. male who presents with hypertensive urgency versus emergency currently asymptomatic and feeling well his neurologic exam is within normal limits is not complaining of any chest pain or shortness of breath.  He will be given his nighttime medications as he states he is due for them but currently asymptomatic.    DIAGNOSTIC STUDIES / PROCEDURES    EKG  Results for orders placed or performed during the hospital encounter of 22   EKG (NOW)   Result Value Ref Range    Report       [facility] Emergency Dept.    Test Date:  2022  Pt Name:    GEETHA BLANCO                  Department: ER  MRN:        [id number]                      Room:  Gender:     Male                         Technician: [id number]  :        1975                   Requested By:ER TRIAGE PROTOCOL  Order #:    [id number]                    Reading MD: Pati Rosado MD    Measurements  Intervals                                Axis  Rate:       87                           P:          41  VT:         179                          QRS:        -20  QRSD:       108                          T:          63  QT:         403  QTc:        485    Interpretive Statements  Sinus rhythm at a rate of 87 no ST elevations or ST depressions no abnormal T  wave inversions no pathologic Q waves normal intervals left axis deviation  with  borderline LVH  Compared to ECG 2022 07:13:04  Sinus tachycardia no longer present  Electronically Signed On  "8- 19:21:12 PDT by Pati Rosado MD         LABS  Pertinent Lab Findings    Labs Reviewed   COMP METABOLIC PANEL - Abnormal; Notable for the following components:       Result Value    Glucose 115 (*)     All other components within normal limits   CBC WITH DIFFERENTIAL   TROPONIN   ESTIMATED GFR       RADIOLOGY  DX-CHEST-PORTABLE (1 VIEW)   Final Result      No acute cardiopulmonary disease.        The radiologist's interpretation of all radiological studies have been reviewed by me.      COURSE & MEDICAL DECISION MAKING  Pertinent Labs & Imaging studies reviewed. (See chart for details)    7:17 PM  I reassessed patient at bedside he states his blood pressure still little elevated but he still feels well.  He unfortunately is still not been given his medications due to shift change but states he just wants to go home and take his own meds he will follow-up with primary care and try to alter his diet over the next few days strict return precautions for any new or worsening symptoms including continued discomfort associated with weakness numbness tingling vision changes speech difficulty.  He understands feels comfortable at home    BP (!) 176/99   Pulse 76   Temp 36.7 °C (98 °F) (Temporal)   Resp 18   Ht 2.083 m (6' 10\")   Wt (!) 156 kg (344 lb 5.7 oz)   SpO2 91%   BMI 36.01 kg/m²       I verified that the patient was wearing a mask and I was wearing appropriate PPE every time I entered the room. The patient's mask was on the patient at all times during my encounter except for a brief view of the oropharynx.    The patient will return for new or worsening symptoms and is stable at the time of discharge.    The patient is referred to a primary physician for blood pressure management, diabetic screening, and for all other preventative health concerns.    DISPOSITION:  Patient will be discharged home in stable condition.    FOLLOW UP:  Peg Valentin, LOURDES.P.R.NDuglas  [address] " [address]  [phone]    Schedule an appointment as soon as possible for a visit       [facility], Emergency Dept  [address]  [place] [id number] [phone]    If symptoms worsen      OUTPATIENT MEDICATIONS:  Discharge Medication List as of 8/24/2022  7:31 PM              FINAL IMPRESSION  1. Primary hypertension        2. Acute non intractable tension-type headache                Electronically signed by: Pati Rosado M.D., 8/24/2022 6:28 PM    This dictation has been created using voice recognition software and/or scribes. The accuracy of the dictation is limited by the abilities of the software and the expertise of the scribes. I expect there may be some errors of grammar and possibly content. I made every attempt to manually correct the errors within my dictation. However, errors related to voice recognition software and/or scribes may still exist and should be interpreted within the appropriate context.     normal/ROM intact/normal gait/strength 5/5 bilateral upper extremities/strength 5/5 bilateral lower extremities details…

## 2023-08-08 ENCOUNTER — APPOINTMENT (OUTPATIENT)
Dept: VASCULAR LAB | Facility: MEDICAL CENTER | Age: 48
End: 2023-08-08
Attending: INTERNAL MEDICINE
Payer: COMMERCIAL

## 2023-08-14 ENCOUNTER — DOCUMENTATION (OUTPATIENT)
Dept: VASCULAR LAB | Facility: MEDICAL CENTER | Age: 48
End: 2023-08-14
Payer: COMMERCIAL

## 2023-08-14 ENCOUNTER — APPOINTMENT (OUTPATIENT)
Dept: VASCULAR LAB | Facility: MEDICAL CENTER | Age: 48
End: 2023-08-14
Attending: FAMILY MEDICINE
Payer: COMMERCIAL

## 2023-08-14 NOTE — PROGRESS NOTES
Gage Garcia has been referred for evaluation and management in the vascular care clinic.     Unfortunately patient missed appointment for initial visit in our office today.  We will be unable to take part in care until or unless patient makes an appointment for a face-to-face visit in our center  We will ask our  or medical assistant to call and reschedule     Pending further patient contact, we will defer all vascular care, including management of cardiovascular risk factors, to PCP and other members of the care team    Vascular Medicine Clinic   [facility]   [phone]

## 2023-08-14 NOTE — PROGRESS NOTES
[facility] - INITIAL EVALUATION   08/14/23      Gage Garcia is a male seen for eval/mgmt of HTN, est ***  Gage Garcia is initially referred by Juan Davalos M.D.     Subjective      HTN:  Initial visit hx:  ***  Home BP log: ***  Adherence to current HTN meds: compliant all of the time   Lifestyle factors:  Weight Change: stable   There is no height or weight on file to calculate BMI.   Wt Readings from Last 3 Encounters:   04/24/23 (!) 175 kg (386 lb 11 oz)   04/21/23 (!) 175 kg (385 lb)   04/10/23 (!) 175 kg (385 lb 12.9 oz)      Diet pattern: {DIET TYPES:75751}  Daily salt intake estimate:  {AMB LOW MODERATE HIGH:225737}     EtOH: {Yes (Enter Details)/No:568670}  Exercise habits: {EXERCISE PATTERN:21}  Perceived barriers to care: ***  Pertinent HTN hx (reviewed at initial visit):   Age at HTN dx: ***  (if female, any hx of pregnancy-related HTN): ***  Past HTN medications: ***  HTN crises:  No prior hospitalization or crises ***  Interfering substances/contributing factors:  {interfering/contributing meds:19110}    Hyperlipidemia:  Stable, no current concerns***  Current treatment: {Statin Intensity:05958}    Antiplatelet/anticoagulation: {Yes (Enter Details)/No:395849}, no bleeding noted     Type 2 DM: {Yes (Enter Details)/No:439598}     CKD: {Yes (Enter Details)/No:185775}     Sleeping disorder/BRENDA: {Yes (Enter Details)/No:564881}     Hypothyroidism: {Yes (Enter Details)/No:842363}     Patient Active Problem List    Diagnosis Date Noted    Postoperative wound infection 03/12/2023    Pseudogout 02/08/2023    Dependence on nicotine from cigarettes 02/05/2023    Advance care planning 02/05/2023    Alcohol consumption binge drinking 02/04/2023    Chronic systolic CHF (congestive heart failure) (HCC) 09/02/2022    Encounter to establish care 09/02/2022    Hypertension 07/26/2022     Past Surgical History:   Procedure Laterality Date    IRRIGATION & DEBRIDEMENT GENERAL Left 3/14/2023     Procedure: IRRIGATION AND DEBRIDEMENT, LEFT ANKLE;  Surgeon: Jorge Persaud M.D.;  Location: SURGERY [facility];  Service: Orthopedics    ORIF, ANKLE Left 2023    Procedure: ORIF, ANKLE;  Surgeon: Jorge Persaud M.D.;  Location: SURGERY [facility];  Service: Orthopedics       Current Outpatient Medications:     furosemide, 20 mg, Oral, BID    amLODIPine, 10 mg, Oral, DAILY    carvedilol, 25 mg, Oral, BID WITH MEALS    losartan, 100 mg, Oral, Q EVENING    hydrALAZINE, 100 mg, Oral, Q8HRS    isosorbide mononitrate SR, 60 mg, Oral, DAILY    amoxicillin-clavulanate, 1 Tablet, Oral, BID    spironolactone, 50 mg, Oral, DAILY    senna-docusate, 2 Tablet, Oral, BID    polyethylene glycol/lytes, 17 g, Oral, QDAY PRN   Patient has no known allergies.    Family History   Problem Relation Age of Onset    Heart Disease Mother     Diabetes Mother     Ovarian Cancer Neg Hx     Tubal Cancer Neg Hx     Breast Cancer Neg Hx     Colorectal Cancer Neg Hx     Peritoneal Cancer Neg Hx      Social History     Tobacco Use    Smoking status: Former     Packs/day: 1.50     Years: 22.00     Pack years: 33.00     Types: Cigarettes     Quit date: 3/8/2023     Years since quittin.4    Smokeless tobacco: Never   Vaping Use    Vaping Use: Never used   Substance Use Topics    Alcohol use: Not Currently     Comment: occ    Drug use: Not Currently     Types: Inhaled, Marijuana     Comment: thc none since 3/11/23      ROS        Objective    There were no vitals filed for this visit.  There is no height or weight on file to calculate BMI.  BP Readings from Last 5 Encounters:   23 134/76   23 135/76   23 115/77   23 (!) 143/84   23 124/88      Physical Exam   Accessory Clinical Findings:     Lab Results   Component Value Date    CHOLSTRLTOT 196 2022     (H) 2022    HDL 39 (A) 2022    TRIGLYCERIDE 177 (H) 2022      No results found for: LIPOPROTA   No results found for: APOB     Lab Results   Component Value Date    PROTHROMBTM 12.6 2023    INR 0.95 2023       Lab Results   Component Value Date    HBA1C 5.7 (H) 2022      Lab Results   Component Value Date    SODIUM 141 2023    POTASSIUM 3.6 2023    CHLORIDE 107 2023    CO2 23 2023    GLUCOSE 108 (H) 2023    BUN 13 2023    CREATININE 0.95 2023    BUNCREATRAT 20.8 2022             No results found for: MICROALBCALC, MALBCRT, MALBEXCR, CGSMBC68, MICROALBUR, MICRALB, UMICROALBUM, MICROALBTIM   VASCULAR IMAGING:  Results for orders placed or performed during the hospital encounter of 23   EKG   Result Value Ref Range    Report       [facility]    Test Date:  2023  Pt Name:    GEETHA BLANCO                  Department: ER  MRN:        [id number]                      Room:  Gender:     Male                         Technician: [id number]  :        1975                   Requested By:ER TRIAGE PROTOCOL  Order #:    [id number]                    Reading MD: Donny Koo    Measurements  Intervals                                Axis  Rate:       101                          P:          59  NM:         191                          QRS:        8  QRSD:       101                          T:          82  QT:         366  QTc:        475    Interpretive Statements  Sinus tachycardia  Probable left atrial enlargement  ST elev, probable normal early repol pattern  Compared to ECG 2023 06:18:29  ST (T wave) deviation now present  Electronically Signed On 3- 23:49:00 PDT by Donny Koo               MEDICAL DECISION-MAKING:  TODAY'S ASSESSMENT / STATUS / PLAN:  No diagnosis found.     Patient Type: {ANTIGOAG_PT_TYPE:91783}    1. BLOOD PRESSURE CONTROL   Qualifies as Resistant HTN (RH):  {RH definitions:80940}  Office BP Goal ACC/AHA (2017) goal <130/80  Home BP at goal:  {yes no:163518}  Office BP at goal:  {yes  no:443655}  24h ABPM:  not ordered to date   RDN candidate? {YES NO UNDECIDED:18959}  Contributing factors: ***   ***  Plan:   Monitoring:   - start/continue home BP monitoring, reviewed correct technique:  Yes   - order 24h ABPM: {YES NO UNDECIDED:13577}  - monitor lytes/gfr routinely   - advised that stabilizing BP may require multiple med changes and close monitoring over the next several months, reviewed that initially there will be additional imaging and labs and the possibility of adverse drug rxn's and variability of his BP and HR until we have things stabilized.  Advised to contact our office as needed for questions or concerns.      2. WORK UP FOR SECONDARY CAUSES OF RH:  Obstructive Sleep Apnea: {AMB NOT YET ASSESSED VS EXCLUDED:9451694}  Renal parenchymal disease: {AMB NOT YET ASSESSED VS EXCLUDED:5468916}  Renovascular HTN: {AMB NOT YET ASSESSED VS EXCLUDED:1442172}  Primary Aldosteronism: {AMB NOT YET ASSESSED VS EXCLUDED:4665035}  Thyroid Function: {AMB NOT YET ASSESSED VS EXCLUDED:5178296}  Pheochromocytoma: {AMB NOT YET ASSESSED VS EXCLUDED:4002510}   Cushing's:   {AMB NOT YET ASSESSED VS EXCLUDED:0209676}   Coarctation of Aorta: excluded  Other: none identified    Recommendations At This Visit: as noted in orders         3. MEDICATION USE / ADHERENCE:   Assessment: {AMB ASSESSMENT 2:9596206}  Recommendations: {AMB MED RECOMMENDATIONS:4970592}         4. HTN-MEDIATED END-ORGAN DAMAGE:  Left Ventricular Hypertrophy: {AMB PRESENT/ABSENT/NOT ASSESSED:3842069} on  {AMB ECHOCARDIOGRAM/EC} Date: ***  Urine Albuminuria: {AMB ALBUMINURIA:7643350} on Date: ***  Renal Function: G***  - avoid nephrotoxins  - continue HTN control with RAAS blockade   - monitor lytes/gfr routinely  - eval with nephrology if worsening over time  Ophthalmologic: {AMB PRESENT/ABSENT/NOT ASSESSED:9087997} per patient report and/or eye specialist   Established Cardiovascular Disease: {AMB PRESENT/ABSENT:5462824}  ***    5.  LIFESTYLE INTERVENTIONS:    SMOKING: {Stages of Change:29133:::1} {TOBACCO QUIT METHODS:81153}  reports that he quit smoking about 5 months ago. His smoking use included cigarettes. He has a 33.00 pack-year smoking history. He has never used smokeless tobacco.   - continued complete avoidance of all tobacco products     PHYSICAL ACTIVITY: continue healthy activity to improve CV fitness.  In general, targeting >150min/week of moderate-level activity or as much as tolerated in light of functional status and co-morbidities     WEIGHT MANAGEMENT AND NUTRITION: {jpcdietoptions:76434}      6. STANDARD HTN PHARMACOTHERAPY:  ACEI/ARB: ***  Thiazide Type Diuretic: ***  DHP-CCB: ***    7. ADDITIONAL AGENTS   Aldosterone Receptor Antagonist: ***  Loop Diuretic: not indicated   Peripheral Alpha Blocker: not indicated   BB: not indicated   Non-DHP-CCB: not indicated   Direct Vasodilator: not indicated   Centrally Acting Alpha Agonist: not indicated   Potassium-sparing diuretic: not indicated   DRI: not indicated     LIPID MANAGEMENT:   Qualifies for Statin Therapy Based on 2018 ACC/AHA Guidelines: {YES/NO:63}, {Statin Benefit Group:72094}  The 10-year ASCVD risk score (Errol ROLLINS, et al., 2019) is: 8.8%, {ASCVD risk group:61773}  Major ASCVD events: {Major ASCVD events:46873}    High-risk conditions: {High-Risk conditions:39112}  Risk-enhancers: {Risk-enhancers 2018:36716}  Currently on Statin: {[facility] VASCULAR STATIN USAGE:04130107}  Tx goals: {LDL treatment goals:76142}  At goal? {YES/NO:63}  Plan:   - reinforced ongoing TLC measures as noted   - monitor labs   Meds:   ***    GLYCEMIA MANAGEMENT:  {[facility] GLYCEMIC MANAGEMENT TYPE:049396}    ANTIPLATELET/ANTICOAGULANT THERAPY:  ***    OTHER ISSUES:     # ***    Studies Ordered At This Visit: ***   Blood Work to Be Obtained Prior to Next Visit: as noted below   Disposition: RTC in {[facility] FOLLOWUP:4274003}      Jose Santos M.D.  [facility]   [facility]   [phone]

## 2023-08-18 DIAGNOSIS — I50.22 CHRONIC SYSTOLIC CHF (CONGESTIVE HEART FAILURE) (HCC): ICD-10-CM

## 2023-08-18 RX ORDER — SPIRONOLACTONE 50 MG/1
50 TABLET, FILM COATED ORAL DAILY
Qty: 30 TABLET | Refills: 3 | Status: SHIPPED | OUTPATIENT
Start: 2023-08-18 | End: 2023-12-19

## 2023-10-02 ENCOUNTER — DOCUMENTATION (OUTPATIENT)
Dept: VASCULAR LAB | Facility: MEDICAL CENTER | Age: 48
End: 2023-10-02
Payer: COMMERCIAL

## 2023-10-02 ENCOUNTER — APPOINTMENT (OUTPATIENT)
Dept: VASCULAR LAB | Facility: MEDICAL CENTER | Age: 48
End: 2023-10-02
Attending: FAMILY MEDICINE
Payer: COMMERCIAL

## 2023-10-02 NOTE — PROGRESS NOTES
Gage Garcia has been referred for evaluation and management in the vascular care clinic.     Unfortunately, this is 2nd new patient no show to establish care   We will be unable to take part in care until or unless patient makes an appointment for a face-to-face visit in our center    No further attempts will be made to schedule at this time except through new referral order.    Multiple attempts have been made to contact patient, but number is disconnected.      Pending further patient contact, we will defer all vascular care, including management of cardiovascular risk factors, to PCP and other members of the care team    Vascular Medicine Clinic   [facility]   [phone]

## 2023-10-17 DIAGNOSIS — I50.22 CHRONIC SYSTOLIC CHF (CONGESTIVE HEART FAILURE) (HCC): ICD-10-CM

## 2023-10-18 RX ORDER — ISOSORBIDE MONONITRATE 60 MG/1
60 TABLET, EXTENDED RELEASE ORAL DAILY
Qty: 90 TABLET | Refills: 3 | Status: SHIPPED | OUTPATIENT
Start: 2023-10-18

## 2023-11-17 ENCOUNTER — OFFICE VISIT (OUTPATIENT)
Dept: MEDICAL GROUP | Facility: MEDICAL CENTER | Age: 48
End: 2023-11-17
Payer: COMMERCIAL

## 2023-11-17 VITALS
HEART RATE: 83 BPM | DIASTOLIC BLOOD PRESSURE: 90 MMHG | OXYGEN SATURATION: 98 % | SYSTOLIC BLOOD PRESSURE: 132 MMHG | TEMPERATURE: 96.7 F | WEIGHT: 315 LBS | BODY MASS INDEX: 36.45 KG/M2 | HEIGHT: 78 IN

## 2023-11-17 DIAGNOSIS — Z12.12 SCREENING FOR COLORECTAL CANCER: ICD-10-CM

## 2023-11-17 DIAGNOSIS — R73.03 PREDIABETES: ICD-10-CM

## 2023-11-17 DIAGNOSIS — Z12.11 SCREENING FOR COLORECTAL CANCER: ICD-10-CM

## 2023-11-17 DIAGNOSIS — I50.22 CHRONIC SYSTOLIC CHF (CONGESTIVE HEART FAILURE) (HCC): ICD-10-CM

## 2023-11-17 DIAGNOSIS — I10 PRIMARY HYPERTENSION: ICD-10-CM

## 2023-11-17 DIAGNOSIS — E66.9 OBESITY (BMI 30-39.9): ICD-10-CM

## 2023-11-17 DIAGNOSIS — Z02.9 ADMINISTRATIVE ENCOUNTER: ICD-10-CM

## 2023-11-17 LAB
HBA1C MFR BLD: 5.9 % (ref ?–5.8)
POCT INT CON NEG: NEGATIVE
POCT INT CON POS: POSITIVE

## 2023-11-17 PROCEDURE — 99214 OFFICE O/P EST MOD 30 MIN: CPT | Performed by: STUDENT IN AN ORGANIZED HEALTH CARE EDUCATION/TRAINING PROGRAM

## 2023-11-17 PROCEDURE — 3080F DIAST BP >= 90 MM HG: CPT | Performed by: STUDENT IN AN ORGANIZED HEALTH CARE EDUCATION/TRAINING PROGRAM

## 2023-11-17 PROCEDURE — 83036 HEMOGLOBIN GLYCOSYLATED A1C: CPT | Performed by: STUDENT IN AN ORGANIZED HEALTH CARE EDUCATION/TRAINING PROGRAM

## 2023-11-17 PROCEDURE — 3075F SYST BP GE 130 - 139MM HG: CPT | Performed by: STUDENT IN AN ORGANIZED HEALTH CARE EDUCATION/TRAINING PROGRAM

## 2023-11-17 RX ORDER — FUROSEMIDE 20 MG/1
20 TABLET ORAL 2 TIMES DAILY
Qty: 180 TABLET | Refills: 3 | Status: SHIPPED | OUTPATIENT
Start: 2023-11-17

## 2023-11-17 RX ORDER — ATORVASTATIN CALCIUM 10 MG/1
20 TABLET, FILM COATED ORAL
COMMUNITY
Start: 2023-09-17 | End: 2023-11-17 | Stop reason: SDUPTHER

## 2023-11-17 RX ORDER — ATORVASTATIN CALCIUM 10 MG/1
20 TABLET, FILM COATED ORAL
Qty: 90 TABLET | Refills: 3 | Status: SHIPPED | OUTPATIENT
Start: 2023-11-17

## 2023-11-17 ASSESSMENT — FIBROSIS 4 INDEX: FIB4 SCORE: 0.96

## 2023-11-17 NOTE — PROGRESS NOTES
"Subjective:     CC: Paperwork    HPI:   Gage presents today with    Past medical history of morbid obesity, chronic systolic heart failure history of uncontrolled type II that    Last visit discussed sleep study    Patient report applying for dental implants presents for risk assessment.  Overall blood pressure reasonably well controlled patient's   Not taking any herbal supplement  Reports can climb flights of stairs without any issue  Not on aspirin  Not on blood thinner  BP relative well controlled    Unfortunately he has missed his appointment with vascular  For blood pressure currently on  - Amlodipine 10 mg daily, carvedilol 25 mg twice daily, Lasix 20 mg twice daily, hydralazine 100 mg 3 times daily, Imdur 60 mg daily  Losartan 100 mg daily  Spironolactone 50 mg daily  May consider changing losartan to Entresto  May consider starting Jardiance    we will tries setting GLP/GIP    No problems updated.    Health Maintenance:     ROS:  ROS    Objective:     Exam:  BP (!) 132/90 (BP Location: Left arm, Patient Position: Sitting)   Pulse 83   Temp 35.9 °C (96.7 °F) (Temporal)   Ht 2.083 m (6' 10.01\")   Wt (!) 167 kg (369 lb)   SpO2 98%   BMI 38.57 kg/m²  Body mass index is 38.57 kg/m².    Physical Exam  Constitutional:       Appearance: Normal appearance.   Cardiovascular:      Rate and Rhythm: Normal rate and regular rhythm.   Pulmonary:      Effort: Pulmonary effort is normal.      Breath sounds: Normal breath sounds.   Neurological:      Mental Status: He is alert.         Labs:     Assessment & Plan:     48 y.o. male with the following -     1. Primary hypertension  Chronic, stable  Currently somewhat well controlled  Unfortunately patient missed appointment with vascular  Blood pressure somewhat well controlled when patient was taking all his medication he reported he missed one of his medication this morning  Blood pressure in last 2 clinic visit has been in the 130s over 80s  Plan  - atorvastatin " (LIPITOR) 10 MG Tab; Take 2 Tablets by mouth every day.  Dispense: 90 Tablet; Refill: 3  - REFERRAL TO CARDIOLOGY  - Lipid Profile; Future  - URIC ACID; Future  - Comp Metabolic Panel; Future  - MICROALBUMIN CREAT RATIO URINE; Future  - Tirzepatide 2.5 MG/0.5ML Solution Pen-injector; Inject 0.5 mL under the skin every 7 days.  Dispense: 2 mL; Refill: 11    2. Chronic systolic CHF (congestive heart failure) (HCC)  Chronic, stable  Last echocardiogram shows EF has improved to 40%  Patient without signs and symptoms of heart failure report can exercise without feeling dyspnea on exertion can climb 2 flights of flare without any issues  And  May consider sending SGLT  May consider transition to Entresto  Cardiology referral  Patient will follow-up in 3 months to discuss above    - furosemide (LASIX) 20 MG Tab; Take 1 Tablet by mouth 2 times a day.  Dispense: 180 Tablet; Refill: 3  - REFERRAL TO CARDIOLOGY  - Lipid Profile; Future  - URIC ACID; Future  - Comp Metabolic Panel; Future  - MICROALBUMIN CREAT RATIO URINE; Future  - Tirzepatide 2.5 MG/0.5ML Solution Pen-injector; Inject 0.5 mL under the skin every 7 days.  Dispense: 2 mL; Refill: 11    3. Prediabetes  Chronic, stable  - POCT Hemoglobin A1C  - Tirzepatide 2.5 MG/0.5ML Solution Pen-injector; Inject 0.5 mL under the skin every 7 days.  Dispense: 2 mL; Refill: 11    4. Obesity (BMI 30-39.9)  Chronic, stable  Elevated BMI in setting of heart failure we will trial zepbound/Ozempic  - Tirzepatide 2.5 MG/0.5ML Solution Pen-injector; Inject 0.5 mL under the skin every 7 days.  Dispense: 2 mL; Refill: 11    5. Screening for colorectal cancer    - Referral to GI for Colonoscopy    6. Admin encounter  Patient is low to moderate risk  Patient is low intermediate risk per RCRI class II risk, 6% cardiac for low risk procedure (dental implant)  Preoperative risk assessment form filled and given back to patient  Return in about 8 weeks (around 1/12/2024) for Lab review, Med  check, entresto.    Please note that this dictation was created using voice recognition software. I have made every reasonable attempt to correct obvious errors, but I expect that there are errors of grammar and possibly content that I did not discover before finalizing the note.

## 2023-11-21 ENCOUNTER — TELEPHONE (OUTPATIENT)
Dept: MEDICAL GROUP | Facility: MEDICAL CENTER | Age: 48
End: 2023-11-21
Payer: COMMERCIAL

## 2023-11-21 NOTE — TELEPHONE ENCOUNTER
FINAL PRIOR AUTHORIZATION STATUS:    1.  Name of Medication & Dose: Mounjaro 2.5 mg     2. Prior Auth Status: Approved through 12/20/2024     3. Action Taken: Pharmacy Notified: no Patient Notified: yes   tympanic

## 2023-12-01 ENCOUNTER — TELEPHONE (OUTPATIENT)
Dept: HEALTH INFORMATION MANAGEMENT | Facility: OTHER | Age: 48
End: 2023-12-01
Payer: COMMERCIAL

## 2023-12-18 DIAGNOSIS — I50.22 CHRONIC SYSTOLIC CHF (CONGESTIVE HEART FAILURE) (HCC): ICD-10-CM

## 2023-12-19 RX ORDER — SPIRONOLACTONE 50 MG/1
50 TABLET, FILM COATED ORAL DAILY
Qty: 30 TABLET | Refills: 2 | Status: SHIPPED | OUTPATIENT
Start: 2023-12-19 | End: 2024-03-18

## 2024-02-01 ENCOUNTER — HOSPITAL ENCOUNTER (EMERGENCY)
Facility: MEDICAL CENTER | Age: 49
End: 2024-02-02
Attending: STUDENT IN AN ORGANIZED HEALTH CARE EDUCATION/TRAINING PROGRAM
Payer: COMMERCIAL

## 2024-02-01 VITALS
RESPIRATION RATE: 16 BRPM | DIASTOLIC BLOOD PRESSURE: 75 MMHG | HEART RATE: 90 BPM | OXYGEN SATURATION: 93 % | WEIGHT: 315 LBS | SYSTOLIC BLOOD PRESSURE: 137 MMHG | BODY MASS INDEX: 36.45 KG/M2 | TEMPERATURE: 97.6 F | HEIGHT: 78 IN

## 2024-02-01 DIAGNOSIS — S39.011A STRAIN OF ABDOMINAL MUSCLE, INITIAL ENCOUNTER: ICD-10-CM

## 2024-02-01 DIAGNOSIS — R10.13 EPIGASTRIC PAIN: ICD-10-CM

## 2024-02-01 DIAGNOSIS — R05.1 ACUTE COUGH: ICD-10-CM

## 2024-02-01 LAB
ALBUMIN SERPL BCP-MCNC: 3.8 G/DL (ref 3.2–4.9)
ALBUMIN/GLOB SERPL: 1 G/DL
ALP SERPL-CCNC: 62 U/L (ref 30–99)
ALT SERPL-CCNC: 30 U/L (ref 2–50)
ANION GAP SERPL CALC-SCNC: 11 MMOL/L (ref 7–16)
APPEARANCE UR: CLEAR
AST SERPL-CCNC: 22 U/L (ref 12–45)
BASOPHILS # BLD AUTO: 0.5 % (ref 0–1.8)
BASOPHILS # BLD: 0.04 K/UL (ref 0–0.12)
BILIRUB SERPL-MCNC: 0.7 MG/DL (ref 0.1–1.5)
BILIRUB UR QL STRIP.AUTO: NEGATIVE
BUN SERPL-MCNC: 21 MG/DL (ref 8–22)
CALCIUM ALBUM COR SERPL-MCNC: 9.5 MG/DL (ref 8.5–10.5)
CALCIUM SERPL-MCNC: 9.3 MG/DL (ref 8.5–10.5)
CHLORIDE SERPL-SCNC: 103 MMOL/L (ref 96–112)
CO2 SERPL-SCNC: 25 MMOL/L (ref 20–33)
COLOR UR: YELLOW
CREAT SERPL-MCNC: 1.44 MG/DL (ref 0.5–1.4)
EOSINOPHIL # BLD AUTO: 0.34 K/UL (ref 0–0.51)
EOSINOPHIL NFR BLD: 3.9 % (ref 0–6.9)
ERYTHROCYTE [DISTWIDTH] IN BLOOD BY AUTOMATED COUNT: 43.2 FL (ref 35.9–50)
GFR SERPLBLD CREATININE-BSD FMLA CKD-EPI: 60 ML/MIN/1.73 M 2
GLOBULIN SER CALC-MCNC: 3.8 G/DL (ref 1.9–3.5)
GLUCOSE SERPL-MCNC: 97 MG/DL (ref 65–99)
GLUCOSE UR STRIP.AUTO-MCNC: NEGATIVE MG/DL
HCT VFR BLD AUTO: 47.5 % (ref 42–52)
HGB BLD-MCNC: 16.5 G/DL (ref 14–18)
IMM GRANULOCYTES # BLD AUTO: 0.07 K/UL (ref 0–0.11)
IMM GRANULOCYTES NFR BLD AUTO: 0.8 % (ref 0–0.9)
KETONES UR STRIP.AUTO-MCNC: NEGATIVE MG/DL
LEUKOCYTE ESTERASE UR QL STRIP.AUTO: NEGATIVE
LIPASE SERPL-CCNC: 36 U/L (ref 11–82)
LYMPHOCYTES # BLD AUTO: 1.5 K/UL (ref 1–4.8)
LYMPHOCYTES NFR BLD: 17.4 % (ref 22–41)
MCH RBC QN AUTO: 28.4 PG (ref 27–33)
MCHC RBC AUTO-ENTMCNC: 34.7 G/DL (ref 32.3–36.5)
MCV RBC AUTO: 81.6 FL (ref 81.4–97.8)
MICRO URNS: NORMAL
MONOCYTES # BLD AUTO: 1.11 K/UL (ref 0–0.85)
MONOCYTES NFR BLD AUTO: 12.9 % (ref 0–13.4)
NEUTROPHILS # BLD AUTO: 5.56 K/UL (ref 1.82–7.42)
NEUTROPHILS NFR BLD: 64.5 % (ref 44–72)
NITRITE UR QL STRIP.AUTO: NEGATIVE
NRBC # BLD AUTO: 0 K/UL
NRBC BLD-RTO: 0 /100 WBC (ref 0–0.2)
PH UR STRIP.AUTO: 5 [PH] (ref 5–8)
PLATELET # BLD AUTO: 206 K/UL (ref 164–446)
PMV BLD AUTO: 9.8 FL (ref 9–12.9)
POTASSIUM SERPL-SCNC: 3.9 MMOL/L (ref 3.6–5.5)
PROT SERPL-MCNC: 7.6 G/DL (ref 6–8.2)
PROT UR QL STRIP: NEGATIVE MG/DL
RBC # BLD AUTO: 5.82 M/UL (ref 4.7–6.1)
RBC UR QL AUTO: NEGATIVE
SODIUM SERPL-SCNC: 139 MMOL/L (ref 135–145)
SP GR UR STRIP.AUTO: 1.02
UROBILINOGEN UR STRIP.AUTO-MCNC: 0.2 MG/DL
WBC # BLD AUTO: 8.6 K/UL (ref 4.8–10.8)

## 2024-02-01 PROCEDURE — 83690 ASSAY OF LIPASE: CPT

## 2024-02-01 PROCEDURE — 36415 COLL VENOUS BLD VENIPUNCTURE: CPT

## 2024-02-01 PROCEDURE — 85025 COMPLETE CBC W/AUTO DIFF WBC: CPT

## 2024-02-01 PROCEDURE — 80053 COMPREHEN METABOLIC PANEL: CPT

## 2024-02-01 PROCEDURE — 81003 URINALYSIS AUTO W/O SCOPE: CPT

## 2024-02-01 PROCEDURE — 99284 EMERGENCY DEPT VISIT MOD MDM: CPT

## 2024-02-01 RX ORDER — METHOCARBAMOL 500 MG/1
500 TABLET, FILM COATED ORAL 3 TIMES DAILY
Qty: 21 TABLET | Refills: 0 | Status: SHIPPED | OUTPATIENT
Start: 2024-02-01

## 2024-02-01 RX ORDER — LIDOCAINE 4 G/G
1 PATCH TOPICAL EVERY 12 HOURS
Qty: 1 PATCH | Refills: 0 | Status: SHIPPED | OUTPATIENT
Start: 2024-02-01

## 2024-02-01 RX ORDER — ACETAMINOPHEN 500 MG
500-1000 TABLET ORAL EVERY 6 HOURS PRN
Qty: 30 TABLET | Refills: 0 | Status: SHIPPED | OUTPATIENT
Start: 2024-02-01

## 2024-02-01 RX ORDER — BENZONATATE 100 MG/1
100 CAPSULE ORAL 3 TIMES DAILY PRN
Qty: 30 CAPSULE | Refills: 0 | Status: SHIPPED | OUTPATIENT
Start: 2024-02-01

## 2024-02-01 ASSESSMENT — FIBROSIS 4 INDEX: FIB4 SCORE: 0.96

## 2024-02-01 NOTE — Clinical Note
Gage Garcia was seen and treated in our emergency department on 2/1/2024.  He may return to work on 02/02/2024.       If you have any questions or concerns, please don't hesitate to call.      Naren Nicole D.O.

## 2024-02-02 NOTE — ED TRIAGE NOTES
Chief Complaint   Patient presents with    Abdominal Pain     Started last Monday; he was trying to put on his shoe; started to cough and felt a very sharp pain  on his left side; pain did not go away and getting worse. Can't sit and lay down to his side. Denies any chest pain no SOB       Pt to triage Ambulatory for above complaint. Protocol ordered.    Pt back to lobby, educated on triage process and encourage to alert staff of any changes.     Vitals:    02/01/24 1957   BP: (!) 142/102   Pulse: 92   Resp: 20   Temp: 36.1 °C (97 °F)   SpO2: 97%

## 2024-02-02 NOTE — ED PROVIDER NOTES
CHIEF COMPLAINT  Chief Complaint   Patient presents with    Abdominal Pain     Started last Monday; he was trying to put on his shoe; started to cough and felt a very sharp pain  on his left side; pain did not go away and getting worse. Can't sit and lay down to his side. Denies any chest pain no SOB       LIMITATION TO HISTORY   Select: None    HPI    Gage Garcia is a 48 y.o. male who presents to the Emergency Department presenting for evaluation of left-sided abdominal pain which is started when he was bending forward trying to put on his shoe and coughing.  He has stated he developed a sharp pain in his left upper quadrant that radiated to his epigastrium.  Has had a dry nonproductive cough for the past several days.  No associates the pain with coughing.  It is better with rest and is better when he splints while coughing.  No fevers associated shortness of breath chest pain nausea vomiting diarrhea or other complaints.    OUTSIDE HISTORIAN(S):  Select: None available    EXTERNAL RECORDS REVIEWED  Select: Other office visit history of chf      PAST MEDICAL HISTORY  Past Medical History:   Diagnosis Date    Hypertension     Obesity     Smoker     Strep pharyngitis      .    SURGICAL HISTORY  Past Surgical History:   Procedure Laterality Date    IRRIGATION & DEBRIDEMENT GENERAL Left 3/14/2023    Procedure: IRRIGATION AND DEBRIDEMENT, LEFT ANKLE;  Surgeon: Jorge Persaud M.D.;  Location: SURGERY [facility];  Service: Orthopedics    ORIF, ANKLE Left 2/4/2023    Procedure: ORIF, ANKLE;  Surgeon: Jorge Persaud M.D.;  Location: SURGERY [facility];  Service: Orthopedics         FAMILY HISTORY  Family History   Problem Relation Age of Onset    Heart Disease Mother     Diabetes Mother     Ovarian Cancer Neg Hx     Tubal Cancer Neg Hx     Breast Cancer Neg Hx     Colorectal Cancer Neg Hx     Peritoneal Cancer Neg Hx           SOCIAL HISTORY  Social History     Socioeconomic History    Marital status: Single      Spouse name: Not on file    Number of children: Not on file    Years of education: Not on file    Highest education level: Not on file   Occupational History    Not on file   Tobacco Use    Smoking status: Former     Current packs/day: 0.00     Average packs/day: 1.5 packs/day for 22.0 years (33.0 ttl pk-yrs)     Types: Cigarettes     Start date: 3/8/2001     Quit date: 3/8/2023     Years since quittin.9    Smokeless tobacco: Never   Vaping Use    Vaping Use: Never used   Substance and Sexual Activity    Alcohol use: Not Currently     Comment: occ    Drug use: Not Currently     Types: Inhaled, Marijuana     Comment: thc none since 3/11/23    Sexual activity: Yes   Other Topics Concern    Not on file   Social History Narrative    Not on file     Social Determinants of Health     Financial Resource Strain: Not on file   Food Insecurity: Not on file   Transportation Needs: Not on file   Physical Activity: Not on file   Stress: Not on file   Social Connections: Not on file   Intimate Partner Violence: Not on file   Housing Stability: Not on file         CURRENT MEDICATIONS  No current facility-administered medications on file prior to encounter.     Current Outpatient Medications on File Prior to Encounter   Medication Sig Dispense Refill    spironolactone (ALDACTONE) 50 MG Tab TAKE 1 TABLET BY MOUTH EVERY DAY 30 Tablet 2    atorvastatin (LIPITOR) 10 MG Tab Take 2 Tablets by mouth every day. 90 Tablet 3    furosemide (LASIX) 20 MG Tab Take 1 Tablet by mouth 2 times a day. 180 Tablet 3    Tirzepatide 2.5 MG/0.5ML Solution Pen-injector Inject 0.5 mL under the skin every 7 days. 2 mL 11    isosorbide mononitrate SR (IMDUR) 60 MG TABLET SR 24 HR TAKE 1 TABLET BY MOUTH EVERY DAY 90 Tablet 3    amLODIPine (NORVASC) 10 MG Tab Take 1 Tablet by mouth every day. 90 Tablet 3    carvedilol (COREG) 25 MG Tab Take 1 Tablet by mouth 2 times a day with meals. 180 Tablet 3    losartan (COZAAR) 100 MG Tab Take 1 Tablet by mouth  "every evening. 90 Tablet 3    hydrALAZINE (APRESOLINE) 100 MG tablet Take 1 Tablet by mouth every 8 hours. 270 Tablet 3    senna-docusate (PERICOLACE OR SENOKOT S) 8.6-50 MG Tab Take 2 Tablets by mouth 2 times a day. 30 Tablet 0    polyethylene glycol/lytes (MIRALAX) 17 g Pack Mix and drink 1 Packet by mouth 1 time a day as needed (if sennosides and docusate ineffective after 24 hours). 15 Each 3           ALLERGIES  No Known Allergies    PHYSICAL EXAM  VITAL SIGNS:/75   Pulse 90   Temp 36.4 °C (97.6 °F) (Temporal)   Resp 16   Ht 2.083 m (6' 10\")   Wt (!) 181 kg (398 lb 2.4 oz)   SpO2 93%   BMI 41.63 kg/m²       VITALS - vital signs documented prior to this note have been reviewed and noted,  GENERAL - awake, alert, oriented, GCS 15, no apparent distress, non-toxic  appearing  HEENT - normocephalic, atraumatic, pupils equal, sclera anicteric, mucus  membranes moist  NECK - supple, no meningismus, full active range of motion, trachea midline  CARDIOVASCULAR - regular rate/rhythm, no murmurs/gallops/rubs  PULMONARY - no respiratory distress, speaking in full sentences, clear to  auscultation bilaterally, no wheezing/ronchi/rales, no accessory muscle use  GASTROINTESTINAL - soft, non-tender, non-distended, no rebound, guarding,  or peritonitis  GENITOURINARY - Deferred  NEUROLOGIC - Awake alert, normal mental status, speech fluid, cognition  normal, moves all extremities  MUSCULOSKELETAL - no obvious asymmetry or deformities present  EXTREMITIES - warm, well-perfused, no cyanosis or significant edema  DERMATOLOGIC - warm, dry, no rashes, no jaundice  PSYCHIATRIC - normal affect, normal insight, normal concentration    DIAGNOSTIC STUDIES / PROCEDURES      LABS  Labs Reviewed   CBC WITH DIFFERENTIAL - Abnormal; Notable for the following components:       Result Value    Lymphocytes 17.40 (*)     Monos (Absolute) 1.11 (*)     All other components within normal limits   COMP METABOLIC PANEL - Abnormal; Notable " for the following components:    Creatinine 1.44 (*)     Globulin 3.8 (*)     All other components within normal limits   LIPASE   URINALYSIS   ESTIMATED GFR        Non actionable         Radiologist interpretation:   No orders to display        COURSE & MEDICAL DECISION MAKING    ED COURSE:    ED Observation Status? no        INITIAL ASSESSMENT, COURSE AND PLAN  Care Narrative: Patient presented for evaluation of abdominal pain which has started after coughing about a week ago.  He does state that the pain in his only occurring when he is coughing, does improve with splinting of his abdomen.  Labs were obtained in triage, showed no abnormalities of clinical significance his abdominal exam is quite benign, he has no vomiting, lowering concern for underlying acute intra-abdominal pathology as a cause of his pain.  There is no palpable hernias.  I do suspect given that his pain started during coughing there may be a musculoskeletal component.  Thus will start the patient on Tylenol Motrin, lidocaine patch as well as muscle relaxers.  Return precautions were discussed at length, as well as incidental findings and appropriate follow-up patient was discharged in a stable condition             ADDITIONAL PROBLEM LIST  Morbid obesity  DISPOSITION AND DISCUSSIONS      Escalation of care considered, and ultimately not performed:diagnostic imaging I considered obtaining a CT abdomen pelvis given the patient's complaint of abdominal pain though he has a benign reassuring abdominal exam his labs are reassuring and he does feel comfortable with coming back for an abdominal recheck should his symptoms not improve thus will defer at this time    Decision tools and prescription drugs considered including, but not limited to: Pain Medications discussed with the patient the use of oral opioids though after a discussion they did feel comfortable using over-the-counter Tylenol and ibuprofen thus narcotics will be deferred .    FINAL  DIAGNOSIS  1. Epigastric pain Acute   2. Acute cough Acute   3. Strain of abdominal muscle, initial encounter Acute            Electronically signed by: Naren Servin DO ,1:26 AM 02/01/24

## 2024-03-16 DIAGNOSIS — I50.22 CHRONIC SYSTOLIC CHF (CONGESTIVE HEART FAILURE) (HCC): ICD-10-CM

## 2024-03-18 RX ORDER — SPIRONOLACTONE 50 MG/1
50 TABLET, FILM COATED ORAL DAILY
Qty: 90 TABLET | Refills: 2 | Status: SHIPPED | OUTPATIENT
Start: 2024-03-18

## 2024-05-18 DIAGNOSIS — I50.22 CHRONIC SYSTOLIC CHF (CONGESTIVE HEART FAILURE) (HCC): ICD-10-CM

## 2024-05-20 RX ORDER — AMLODIPINE BESYLATE 10 MG/1
10 TABLET ORAL DAILY
Qty: 90 TABLET | Refills: 3 | Status: SHIPPED | OUTPATIENT
Start: 2024-05-20

## 2024-05-20 RX ORDER — HYDRALAZINE HYDROCHLORIDE 100 MG/1
100 TABLET, FILM COATED ORAL EVERY 8 HOURS
Qty: 270 TABLET | Refills: 3 | Status: SHIPPED | OUTPATIENT
Start: 2024-05-20

## 2024-05-20 RX ORDER — LOSARTAN POTASSIUM 100 MG/1
100 TABLET ORAL EVERY EVENING
Qty: 90 TABLET | Refills: 3 | Status: SHIPPED | OUTPATIENT
Start: 2024-05-20

## 2024-05-20 RX ORDER — CARVEDILOL 25 MG/1
25 TABLET ORAL 2 TIMES DAILY WITH MEALS
Qty: 180 TABLET | Refills: 3 | Status: SHIPPED | OUTPATIENT
Start: 2024-05-20

## 2024-08-23 ENCOUNTER — OFFICE VISIT (OUTPATIENT)
Dept: MEDICAL GROUP | Facility: MEDICAL CENTER | Age: 49
End: 2024-08-23
Payer: COMMERCIAL

## 2024-08-23 VITALS
BODY MASS INDEX: 36.45 KG/M2 | WEIGHT: 315 LBS | SYSTOLIC BLOOD PRESSURE: 96 MMHG | HEART RATE: 87 BPM | HEIGHT: 78 IN | DIASTOLIC BLOOD PRESSURE: 66 MMHG | TEMPERATURE: 97.4 F | OXYGEN SATURATION: 93 %

## 2024-08-23 DIAGNOSIS — Z12.11 SCREENING FOR COLORECTAL CANCER: ICD-10-CM

## 2024-08-23 DIAGNOSIS — I10 PRIMARY HYPERTENSION: ICD-10-CM

## 2024-08-23 DIAGNOSIS — Z12.12 SCREENING FOR COLORECTAL CANCER: ICD-10-CM

## 2024-08-23 DIAGNOSIS — R73.03 PREDIABETES: ICD-10-CM

## 2024-08-23 DIAGNOSIS — E66.01 OBESITY, CLASS III, BMI 40-49.9 (MORBID OBESITY) (HCC): ICD-10-CM

## 2024-08-23 DIAGNOSIS — I50.22 CHRONIC SYSTOLIC CHF (CONGESTIVE HEART FAILURE) (HCC): ICD-10-CM

## 2024-08-23 PROBLEM — Z71.89 ADVANCE CARE PLANNING: Status: RESOLVED | Noted: 2023-02-05 | Resolved: 2024-08-23

## 2024-08-23 PROBLEM — Z76.89 ENCOUNTER TO ESTABLISH CARE: Status: RESOLVED | Noted: 2022-09-02 | Resolved: 2024-08-23

## 2024-08-23 PROBLEM — T81.49XA POSTOPERATIVE WOUND INFECTION: Status: RESOLVED | Noted: 2023-03-12 | Resolved: 2024-08-23

## 2024-08-23 PROBLEM — F10.10 ALCOHOL CONSUMPTION BINGE DRINKING: Status: RESOLVED | Noted: 2023-02-04 | Resolved: 2024-08-23

## 2024-08-23 PROBLEM — E66.813 OBESITY, CLASS III, BMI 40-49.9 (MORBID OBESITY): Status: ACTIVE | Noted: 2023-02-13

## 2024-08-23 LAB
HBA1C MFR BLD: 6.1 % (ref ?–5.8)
POCT INT CON NEG: NEGATIVE
POCT INT CON POS: POSITIVE

## 2024-08-23 PROCEDURE — 3074F SYST BP LT 130 MM HG: CPT | Performed by: STUDENT IN AN ORGANIZED HEALTH CARE EDUCATION/TRAINING PROGRAM

## 2024-08-23 PROCEDURE — 83036 HEMOGLOBIN GLYCOSYLATED A1C: CPT | Performed by: STUDENT IN AN ORGANIZED HEALTH CARE EDUCATION/TRAINING PROGRAM

## 2024-08-23 PROCEDURE — 3078F DIAST BP <80 MM HG: CPT | Performed by: STUDENT IN AN ORGANIZED HEALTH CARE EDUCATION/TRAINING PROGRAM

## 2024-08-23 PROCEDURE — 99214 OFFICE O/P EST MOD 30 MIN: CPT | Performed by: STUDENT IN AN ORGANIZED HEALTH CARE EDUCATION/TRAINING PROGRAM

## 2024-08-23 RX ORDER — SEMAGLUTIDE 0.25 MG/.5ML
0.25 INJECTION, SOLUTION SUBCUTANEOUS
Qty: 2 ML | Refills: 11 | Status: SHIPPED | OUTPATIENT
Start: 2024-08-23 | End: 2024-08-26

## 2024-08-23 ASSESSMENT — ENCOUNTER SYMPTOMS
VOMITING: 0
CHILLS: 0
HEADACHES: 0
WEIGHT LOSS: 0
FEVER: 0
SHORTNESS OF BREATH: 0
NAUSEA: 0
PALPITATIONS: 0
WHEEZING: 0
DIZZINESS: 0

## 2024-08-23 ASSESSMENT — FIBROSIS 4 INDEX: FIB4 SCORE: 0.94

## 2024-08-23 ASSESSMENT — PATIENT HEALTH QUESTIONNAIRE - PHQ9: CLINICAL INTERPRETATION OF PHQ2 SCORE: 0

## 2024-08-23 NOTE — PROGRESS NOTES
Subjective:     CC: weight loss    HPI:   Gage presents today with    Last seen 9 months ago   Past medical history of morbid obesity, chronic systolic heart failure, resistant hypertension    Missed vascular medicine visit  Did not follow up with cardiology  He report weight gain significantly since he hurt his lowoer extremity. He would like to be considered for weight loss medication wegovy. We discusssed indication and insetting ofo bmi and CHF it may be a reasonable medication. We discussed adverse effect and need to monitor BP, and screening for BRENDA. Patient declined screening for BRENDA. We discuss recent concern for visual concerns w glp.     Hypotension  Report recent got braces, report he has been fasting for 36 hours  Report home bp usually at goal at 120-130s      Problem   Obesity, Class III, Bmi 40-49.9 (Morbid Obesity) ([facility])    Previously watching his diet and exercise regularly. However limited due to ankle fracture     Postoperative Wound Infection (Resolved)    S/p ORIF left lateral malleolus and internal fixation left syndesmosis on 2/4/2023 with subsequent I&D on 3/14/2023 by Dr. Persaud.    Post op Wound infection.  OR Cultures + E faecalis (ampicillin Sensitive) , Klebsiella oxytoca , Proteus (ampicillin sensitive), Bianca magna  S/p daptomycin x 6 week, and transition to oral augmentin BID EOT 10/25/23      Advance Care Planning (Resolved)   Alcohol Consumption Binge Drinking (Resolved)   Encounter to Establish Care (Resolved)       Health Maintenance:     ROS:  Review of Systems   Constitutional:  Negative for chills, fever and weight loss.   HENT:  Negative for hearing loss.    Respiratory:  Negative for shortness of breath and wheezing.    Cardiovascular:  Negative for chest pain and palpitations.   Gastrointestinal:  Negative for nausea and vomiting.   Genitourinary:  Negative for frequency and urgency.   Skin:  Negative for rash.   Neurological:  Negative for dizziness and headaches.  "      Objective:     Exam:  BP 96/66 (BP Location: Left arm)   Pulse 87   Temp 36.3 °C (97.4 °F)   Ht 2.083 m (6' 10\")   Wt (!) 191 kg (420 lb 13.7 oz)   SpO2 93%   BMI 44.01 kg/m²  Body mass index is 44.01 kg/m².    Physical Exam  Constitutional:       Appearance: Normal appearance.   Cardiovascular:      Rate and Rhythm: Normal rate and regular rhythm.   Pulmonary:      Effort: Pulmonary effort is normal.      Breath sounds: Normal breath sounds.   Musculoskeletal:      Cervical back: Normal range of motion and neck supple.   Neurological:      Mental Status: He is alert.         Labs:     Assessment & Plan:     48 y.o. male with the following -     1. Prediabetes  Chronic stable    - POCT Hemoglobin A1C  - Lipid Profile; Future  - TSH WITH REFLEX TO FT4; Future  - HEMOGLOBIN A1C; Future    2. Chronic systolic CHF (congestive heart failure) ([facility])  Chronic stable  Continue furosemide, spironolactone, losartan 100mg and carvedilol   - Referral to Pharmacotherapy Service  - Semaglutide (WEGOVY) 0.25 MG/0.5ML Solution Auto-injector Pen-injector; Inject 0.5 mL under the skin every 7 days.  Dispense: 2 mL; Refill: 11  - Lipid Profile; Future  - TSH WITH REFLEX TO FT4; Future    3. Obesity, Class III, BMI 40-49.9 (morbid obesity) ([facility])  Chronic stable   - Referral to Pharmacotherapy Service  - Patient identified as having weight management issue.  Appropriate orders and counseling given.  - Semaglutide (WEGOVY) 0.25 MG/0.5ML Solution Auto-injector Pen-injector; Inject 0.5 mL under the skin every 7 days.  Dispense: 2 mL; Refill: 11    4. Primary hypertension  Chronic stable  Treatment resistant  Well controlled  Low today- asymptomatic, he attributes to fasting due to recent braces for 30 + hours  Discuss will need to monitor bp, on glp1        6. Screening for colorectal cancer  Declined today, due to some apartment renovation         Return in about 3 months (around 11/23/2024) for Lab review, Med check.    Please " note that this dictation was created using voice recognition software. I have made every reasonable attempt to correct obvious errors, but I expect that there are errors of grammar and possibly content that I did not discover before finalizing the note.

## 2024-08-26 DIAGNOSIS — E66.01 MORBID OBESITY WITH BMI OF 40.0-44.9, ADULT (HCC): ICD-10-CM

## 2024-08-26 DIAGNOSIS — I10 PRIMARY HYPERTENSION: ICD-10-CM

## 2024-08-26 DIAGNOSIS — R73.03 PREDIABETES: ICD-10-CM

## 2024-08-26 DIAGNOSIS — I50.22 CHRONIC SYSTOLIC CHF (CONGESTIVE HEART FAILURE) (HCC): ICD-10-CM

## 2024-08-26 DIAGNOSIS — E66.01 OBESITY, CLASS III, BMI 40-49.9 (MORBID OBESITY) (HCC): ICD-10-CM

## 2024-08-26 RX ORDER — TIRZEPATIDE 5 MG/.5ML
5 INJECTION, SOLUTION SUBCUTANEOUS
Qty: 2 ML | Refills: 11 | Status: SHIPPED | OUTPATIENT
Start: 2024-09-26

## 2024-08-26 RX ORDER — TIRZEPATIDE 2.5 MG/.5ML
2.5 INJECTION, SOLUTION SUBCUTANEOUS
Qty: 2 ML | Refills: 0 | Status: SHIPPED | OUTPATIENT
Start: 2024-08-26 | End: 2024-09-25

## 2024-08-28 ENCOUNTER — TELEPHONE (OUTPATIENT)
Dept: HEALTH INFORMATION MANAGEMENT | Facility: OTHER | Age: 49
End: 2024-08-28
Payer: COMMERCIAL

## 2024-09-23 DIAGNOSIS — I10 PRIMARY HYPERTENSION: ICD-10-CM

## 2024-09-24 RX ORDER — ATORVASTATIN CALCIUM 10 MG/1
20 TABLET, FILM COATED ORAL
Qty: 90 TABLET | Refills: 3 | Status: SHIPPED | OUTPATIENT
Start: 2024-09-24

## 2024-09-26 ENCOUNTER — APPOINTMENT (OUTPATIENT)
Dept: VASCULAR LAB | Facility: MEDICAL CENTER | Age: 49
End: 2024-09-26
Attending: INTERNAL MEDICINE
Payer: COMMERCIAL

## 2024-10-26 DIAGNOSIS — I50.22 CHRONIC SYSTOLIC CHF (CONGESTIVE HEART FAILURE) (HCC): ICD-10-CM

## 2024-10-28 RX ORDER — ISOSORBIDE MONONITRATE 60 MG/1
60 TABLET, EXTENDED RELEASE ORAL DAILY
Qty: 90 TABLET | Refills: 3 | Status: SHIPPED | OUTPATIENT
Start: 2024-10-28

## 2024-11-23 DIAGNOSIS — I50.22 CHRONIC SYSTOLIC CHF (CONGESTIVE HEART FAILURE) (HCC): ICD-10-CM

## 2024-11-25 RX ORDER — FUROSEMIDE 20 MG/1
20 TABLET ORAL 2 TIMES DAILY
Qty: 180 TABLET | Refills: 3 | Status: SHIPPED | OUTPATIENT
Start: 2024-11-25

## 2024-11-25 NOTE — TELEPHONE ENCOUNTER
Received request via: Pharmacy    Was the patient seen in the last year in this department? Yes    Does the patient have an active prescription (recently filled or refills available) for medication(s) requested? No    Pharmacy Name:     [facility] #[id number] - [place], [place] - [address]  AT [facility] [address]  [address] [facility] [address]  Phone: [phone] Fax: [phone]       Does the patient have [facility] Plus and need 100-day supply? (This applies to ALL medications) Patient does not have SCP

## 2024-12-28 DIAGNOSIS — I50.22 CHRONIC SYSTOLIC CHF (CONGESTIVE HEART FAILURE) (HCC): ICD-10-CM

## 2024-12-30 RX ORDER — SPIRONOLACTONE 50 MG/1
50 TABLET, FILM COATED ORAL DAILY
Qty: 90 TABLET | Refills: 0 | Status: SHIPPED | OUTPATIENT
Start: 2024-12-30

## 2025-02-27 DIAGNOSIS — I50.22 CHRONIC SYSTOLIC CHF (CONGESTIVE HEART FAILURE) (HCC): ICD-10-CM

## 2025-02-27 RX ORDER — SPIRONOLACTONE 50 MG/1
50 TABLET, FILM COATED ORAL DAILY
Qty: 90 TABLET | Refills: 0 | Status: SHIPPED | OUTPATIENT
Start: 2025-02-27

## 2025-02-27 NOTE — TELEPHONE ENCOUNTER
Received request via: Pharmacy    Was the patient seen in the last year in this department? Yes    Does the patient have an active prescription (recently filled or refills available) for medication(s) requested? No    Pharmacy Name:   To be filled at: [id number] - [place], [place] - [address] AT [facility] [address]              Does the patient have [facility] Plus and need 100-day supply? (This applies to ALL medications) Patient does not have SCP

## 2025-03-11 DIAGNOSIS — I10 PRIMARY HYPERTENSION: ICD-10-CM

## 2025-03-11 RX ORDER — ATORVASTATIN CALCIUM 10 MG/1
20 TABLET, FILM COATED ORAL
Qty: 90 TABLET | Refills: 3 | Status: SHIPPED | OUTPATIENT
Start: 2025-03-11

## 2025-03-11 NOTE — TELEPHONE ENCOUNTER
Received request via: Pharmacy    Was the patient seen in the last year in this department? Yes    Does the patient have an active prescription (recently filled or refills available) for medication(s) requested? No    Pharmacy Name:    [facility] #[id number] - [place], [place] - [address] AT [facility] [address]          Does the patient have [facility] and need 100-day supply? (This applies to ALL medications) Patient does not have SCP

## 2025-05-22 DIAGNOSIS — I50.22 CHRONIC SYSTOLIC CHF (CONGESTIVE HEART FAILURE) (HCC): ICD-10-CM

## 2025-05-22 RX ORDER — AMLODIPINE BESYLATE 10 MG/1
10 TABLET ORAL DAILY
Qty: 90 TABLET | Refills: 3 | Status: SHIPPED | OUTPATIENT
Start: 2025-05-22

## 2025-05-22 NOTE — TELEPHONE ENCOUNTER
Received request via: Pharmacy    Was the patient seen in the last year in this department? Yes    Does the patient have an active prescription (recently filled or refills available) for medication(s) requested? No    Pharmacy Name:    [facility] #[id number] - [place], [place] - [address] AT [facility] [address]          Does the patient have prison Plus and need 100-day supply? (This applies to ALL medications) Patient does not have SCP

## 2025-05-27 DIAGNOSIS — I50.22 CHRONIC SYSTOLIC CHF (CONGESTIVE HEART FAILURE) (HCC): ICD-10-CM

## 2025-05-27 NOTE — TELEPHONE ENCOUNTER
Received request via: Pharmacy    Was the patient seen in the last year in this department? Yes    Does the patient have an active prescription (recently filled or refills available) for medication(s) requested? No    Pharmacy Name:  [facility] #[id number] - [place], [place] - [address] AT [facility] [address]     Does the patient have retirement Plus and need 100-day supply? (This applies to ALL medications) Patient does not have SCP

## 2025-05-28 RX ORDER — HYDRALAZINE HYDROCHLORIDE 100 MG/1
100 TABLET, FILM COATED ORAL EVERY 8 HOURS
Qty: 270 TABLET | Refills: 3 | Status: SHIPPED | OUTPATIENT
Start: 2025-05-28

## 2025-05-28 RX ORDER — CARVEDILOL 25 MG/1
25 TABLET ORAL 2 TIMES DAILY WITH MEALS
Qty: 180 TABLET | Refills: 3 | Status: SHIPPED | OUTPATIENT
Start: 2025-05-28

## 2025-05-28 RX ORDER — LOSARTAN POTASSIUM 100 MG/1
100 TABLET ORAL EVERY EVENING
Qty: 90 TABLET | Refills: 3 | Status: SHIPPED | OUTPATIENT
Start: 2025-05-28

## 2025-05-28 RX ORDER — SPIRONOLACTONE 50 MG/1
50 TABLET, FILM COATED ORAL DAILY
Qty: 90 TABLET | Refills: 0 | Status: SHIPPED | OUTPATIENT
Start: 2025-05-28

## 2025-06-30 ENCOUNTER — APPOINTMENT (OUTPATIENT)
Dept: RADIOLOGY | Facility: MEDICAL CENTER | Age: 50
End: 2025-06-30
Attending: EMERGENCY MEDICINE
Payer: COMMERCIAL

## 2025-06-30 ENCOUNTER — HOSPITAL ENCOUNTER (EMERGENCY)
Facility: MEDICAL CENTER | Age: 50
End: 2025-06-30
Attending: EMERGENCY MEDICINE
Payer: COMMERCIAL

## 2025-06-30 ENCOUNTER — PHARMACY VISIT (OUTPATIENT)
Dept: PHARMACY | Facility: MEDICAL CENTER | Age: 50
End: 2025-06-30
Payer: COMMERCIAL

## 2025-06-30 VITALS
HEART RATE: 99 BPM | SYSTOLIC BLOOD PRESSURE: 150 MMHG | WEIGHT: 315 LBS | DIASTOLIC BLOOD PRESSURE: 80 MMHG | OXYGEN SATURATION: 94 % | BODY MASS INDEX: 36.45 KG/M2 | HEIGHT: 78 IN | RESPIRATION RATE: 19 BRPM

## 2025-06-30 DIAGNOSIS — E86.0 DEHYDRATION: ICD-10-CM

## 2025-06-30 DIAGNOSIS — E66.813 OBESITY, CLASS III, BMI 40-49.9 (MORBID OBESITY): ICD-10-CM

## 2025-06-30 DIAGNOSIS — R55 SYNCOPE, UNSPECIFIED SYNCOPE TYPE: ICD-10-CM

## 2025-06-30 DIAGNOSIS — S83.91XA SPRAIN OF RIGHT KNEE, UNSPECIFIED LIGAMENT, INITIAL ENCOUNTER: Primary | ICD-10-CM

## 2025-06-30 LAB
ALBUMIN SERPL BCP-MCNC: 3.4 G/DL (ref 3.2–4.9)
ALBUMIN/GLOB SERPL: 1.1 G/DL
ALP SERPL-CCNC: 51 U/L (ref 30–99)
ALT SERPL-CCNC: 14 U/L (ref 2–50)
ANION GAP SERPL CALC-SCNC: 12 MMOL/L (ref 7–16)
AST SERPL-CCNC: 18 U/L (ref 12–45)
BASOPHILS # BLD AUTO: 0.5 % (ref 0–1.8)
BASOPHILS # BLD: 0.05 K/UL (ref 0–0.12)
BILIRUB SERPL-MCNC: 1.5 MG/DL (ref 0.1–1.5)
BUN SERPL-MCNC: 16 MG/DL (ref 8–22)
CALCIUM ALBUM COR SERPL-MCNC: 8.9 MG/DL (ref 8.5–10.5)
CALCIUM SERPL-MCNC: 8.4 MG/DL (ref 8.5–10.5)
CHLORIDE SERPL-SCNC: 107 MMOL/L (ref 96–112)
CO2 SERPL-SCNC: 19 MMOL/L (ref 20–33)
CREAT SERPL-MCNC: 1.12 MG/DL (ref 0.5–1.4)
EKG IMPRESSION: NORMAL
EOSINOPHIL # BLD AUTO: 0.15 K/UL (ref 0–0.51)
EOSINOPHIL NFR BLD: 1.4 % (ref 0–6.9)
ERYTHROCYTE [DISTWIDTH] IN BLOOD BY AUTOMATED COUNT: 44.6 FL (ref 35.9–50)
GFR SERPLBLD CREATININE-BSD FMLA CKD-EPI: 80 ML/MIN/1.73 M 2
GLOBULIN SER CALC-MCNC: 3.2 G/DL (ref 1.9–3.5)
GLUCOSE SERPL-MCNC: 119 MG/DL (ref 65–99)
HCT VFR BLD AUTO: 45.4 % (ref 42–52)
HGB BLD-MCNC: 15.4 G/DL (ref 14–18)
IMM GRANULOCYTES # BLD AUTO: 0.04 K/UL (ref 0–0.11)
IMM GRANULOCYTES NFR BLD AUTO: 0.4 % (ref 0–0.9)
LYMPHOCYTES # BLD AUTO: 1.6 K/UL (ref 1–4.8)
LYMPHOCYTES NFR BLD: 14.8 % (ref 22–41)
MCH RBC QN AUTO: 28.4 PG (ref 27–33)
MCHC RBC AUTO-ENTMCNC: 33.9 G/DL (ref 32.3–36.5)
MCV RBC AUTO: 83.8 FL (ref 81.4–97.8)
MONOCYTES # BLD AUTO: 0.67 K/UL (ref 0–0.85)
MONOCYTES NFR BLD AUTO: 6.2 % (ref 0–13.4)
NEUTROPHILS # BLD AUTO: 8.28 K/UL (ref 1.82–7.42)
NEUTROPHILS NFR BLD: 76.7 % (ref 44–72)
NRBC # BLD AUTO: 0 K/UL
NRBC BLD-RTO: 0 /100 WBC (ref 0–0.2)
PLATELET # BLD AUTO: 162 K/UL (ref 164–446)
PMV BLD AUTO: 10.2 FL (ref 9–12.9)
POTASSIUM SERPL-SCNC: 3.3 MMOL/L (ref 3.6–5.5)
PROT SERPL-MCNC: 6.6 G/DL (ref 6–8.2)
RBC # BLD AUTO: 5.42 M/UL (ref 4.7–6.1)
SODIUM SERPL-SCNC: 138 MMOL/L (ref 135–145)
TROPONIN T SERPL-MCNC: 15 NG/L (ref 6–19)
WBC # BLD AUTO: 10.8 K/UL (ref 4.8–10.8)

## 2025-06-30 PROCEDURE — 36415 COLL VENOUS BLD VENIPUNCTURE: CPT

## 2025-06-30 PROCEDURE — 73610 X-RAY EXAM OF ANKLE: CPT | Mod: LT

## 2025-06-30 PROCEDURE — 71045 X-RAY EXAM CHEST 1 VIEW: CPT

## 2025-06-30 PROCEDURE — RXMED WILLOW AMBULATORY MEDICATION CHARGE: Performed by: EMERGENCY MEDICINE

## 2025-06-30 PROCEDURE — 73562 X-RAY EXAM OF KNEE 3: CPT | Mod: LT

## 2025-06-30 PROCEDURE — 96374 THER/PROPH/DIAG INJ IV PUSH: CPT

## 2025-06-30 PROCEDURE — 700111 HCHG RX REV CODE 636 W/ 250 OVERRIDE (IP): Mod: JZ | Performed by: EMERGENCY MEDICINE

## 2025-06-30 PROCEDURE — 84484 ASSAY OF TROPONIN QUANT: CPT

## 2025-06-30 PROCEDURE — 80053 COMPREHEN METABOLIC PANEL: CPT

## 2025-06-30 PROCEDURE — 85025 COMPLETE CBC W/AUTO DIFF WBC: CPT

## 2025-06-30 PROCEDURE — 99285 EMERGENCY DEPT VISIT HI MDM: CPT

## 2025-06-30 PROCEDURE — 93005 ELECTROCARDIOGRAM TRACING: CPT | Mod: TC | Performed by: EMERGENCY MEDICINE

## 2025-06-30 PROCEDURE — 73562 X-RAY EXAM OF KNEE 3: CPT | Mod: RT

## 2025-06-30 PROCEDURE — A9270 NON-COVERED ITEM OR SERVICE: HCPCS | Performed by: EMERGENCY MEDICINE

## 2025-06-30 PROCEDURE — 73630 X-RAY EXAM OF FOOT: CPT | Mod: LT

## 2025-06-30 PROCEDURE — 700102 HCHG RX REV CODE 250 W/ 637 OVERRIDE(OP): Performed by: EMERGENCY MEDICINE

## 2025-06-30 PROCEDURE — 700105 HCHG RX REV CODE 258: Performed by: EMERGENCY MEDICINE

## 2025-06-30 RX ORDER — IBUPROFEN 600 MG/1
600 TABLET, FILM COATED ORAL EVERY 6 HOURS PRN
Qty: 30 TABLET | Refills: 0 | Status: SHIPPED | OUTPATIENT
Start: 2025-06-30

## 2025-06-30 RX ORDER — POTASSIUM CHLORIDE 1500 MG/1
40 TABLET, EXTENDED RELEASE ORAL ONCE
Status: COMPLETED | OUTPATIENT
Start: 2025-06-30 | End: 2025-06-30

## 2025-06-30 RX ORDER — OXYCODONE HYDROCHLORIDE 5 MG/1
5 TABLET ORAL EVERY 6 HOURS PRN
Qty: 12 TABLET | Refills: 0 | Status: SHIPPED | OUTPATIENT
Start: 2025-06-30 | End: 2025-07-05

## 2025-06-30 RX ORDER — POTASSIUM CHLORIDE 1500 MG/1
20 TABLET, EXTENDED RELEASE ORAL ONCE
Status: COMPLETED | OUTPATIENT
Start: 2025-06-30 | End: 2025-06-30

## 2025-06-30 RX ORDER — KETOROLAC TROMETHAMINE 15 MG/ML
15 INJECTION, SOLUTION INTRAMUSCULAR; INTRAVENOUS ONCE
Status: COMPLETED | OUTPATIENT
Start: 2025-06-30 | End: 2025-06-30

## 2025-06-30 RX ORDER — OXYCODONE AND ACETAMINOPHEN 5; 325 MG/1; MG/1
1 TABLET ORAL ONCE
Refills: 0 | Status: COMPLETED | OUTPATIENT
Start: 2025-06-30 | End: 2025-06-30

## 2025-06-30 RX ORDER — SODIUM CHLORIDE 9 MG/ML
1000 INJECTION, SOLUTION INTRAVENOUS ONCE
Status: COMPLETED | OUTPATIENT
Start: 2025-06-30 | End: 2025-06-30

## 2025-06-30 RX ADMIN — KETOROLAC TROMETHAMINE 15 MG: 15 INJECTION, SOLUTION INTRAMUSCULAR; INTRAVENOUS at 20:58

## 2025-06-30 RX ADMIN — POTASSIUM CHLORIDE 20 MEQ: 1500 TABLET, EXTENDED RELEASE ORAL at 21:11

## 2025-06-30 RX ADMIN — POTASSIUM CHLORIDE 20 MEQ: 1500 TABLET, EXTENDED RELEASE ORAL at 20:56

## 2025-06-30 RX ADMIN — SODIUM CHLORIDE 1000 ML: 9 INJECTION, SOLUTION INTRAVENOUS at 18:45

## 2025-06-30 RX ADMIN — OXYCODONE AND ACETAMINOPHEN 1 TABLET: 5; 325 TABLET ORAL at 20:57

## 2025-06-30 ASSESSMENT — PAIN DESCRIPTION - PAIN TYPE
TYPE: ACUTE PAIN

## 2025-06-30 ASSESSMENT — FIBROSIS 4 INDEX: FIB4 SCORE: 0.96

## 2025-06-30 NOTE — LETTER
Gage Garcia was seen and treated in our emergency department on 6/30/2025.  He may require to remain off work until 7/9/2025. He may return to work sooner if he feels able.       If you have any questions or concerns, please don't hesitate to call.      Dr. Dipti Giron M.D.   // Radha VERA RN ED [facility]

## 2025-07-01 NOTE — ED TRIAGE NOTES
.  Chief Complaint   Patient presents with    GLF      Pt BIB EMS from home. Per report Pt was on a 3 day fast, felt light headed and dizzy. Pt bent down to get a piece of mail and fell forward onto his knees then backwards onto his back. Pt was stuck with both knees bent under him, crawled to bed. C/O bilateral knee pain.   Pt given NS 250mL, Zofran 4 mg, Fentanyl 50 mcg PTA

## 2025-07-01 NOTE — ED PROVIDER NOTES
ED Provider Note    CHIEF COMPLAINT  Chief Complaint   Patient presents with    GLF     EXTERNAL RECORDS REVIEWED  Review of records show that the patient was last seen by his primary doctor August 2024. The patient has history of heart failure, hypertension, obesity.     HPI/ROS  LIMITATION TO HISTORY   Select: : None  OUTSIDE HISTORIAN(S):  None.    Gage Garcia is a 49 y.o. male who presents to the Emergency Department via EMS from home for evaluation of syncopal episode and ground level fall onset yesterday. He describes that he was fasting, which he notes he typically does often. The patient notes that he only drinks water while fasting. The first 2 days, the patient typically feels weak, but states that he starts to feel improved the third day. Yesterday was the third day of his fast and he reports that he remembers bending down to get a piece of mail and then woke up on the ground. He notes that he felt lightheaded and dizzy prior to the syncopal episode. The patient was able to crawl to his bed afterwards and was able to get himself up onto bed. The patient called EMS today, as he has not been able to get out of bed since yesterday. The patient states he also has bilateral knee pain, right worse than the left, left foot pain, and left ankle pain. The patient takes a baby aspirin. He received 250 mL of normal saline, 4 mg of Zofran and 50 mcg of fentanyl prior to arrival with EMS.     PAST MEDICAL HISTORY  Past Medical History[1]     SURGICAL HISTORY  Past Surgical History[2]     FAMILY HISTORY  Family History   Problem Relation Age of Onset    Heart Disease Mother     Diabetes Mother     Ovarian Cancer Neg Hx     Tubal Cancer Neg Hx     Breast Cancer Neg Hx     Colorectal Cancer Neg Hx     Peritoneal Cancer Neg Hx      SOCIAL HISTORY   reports that he quit smoking about 2 years ago. His smoking use included cigarettes. He started smoking about 24 years ago. He has a 33 pack-year smoking history. He has  "never used smokeless tobacco. He reports that he does not currently use alcohol. He reports that he does not currently use drugs after having used the following drugs: Inhaled and Marijuana.    CURRENT MEDICATIONS  Previous Medications    ACETAMINOPHEN (TYLENOL) 500 MG TAB    Take 1-2 Tablets by mouth every 6 hours as needed for Moderate Pain.    AMLODIPINE (NORVASC) 10 MG TAB    TAKE 1 TABLET BY MOUTH EVERY DAY    ATORVASTATIN (LIPITOR) 10 MG TAB    TAKE 2 TABLETS BY MOUTH EVERY DAY    CARVEDILOL (COREG) 25 MG TAB    TAKE 1 TABLET BY MOUTH TWICE DAILY WITH MEALS    FUROSEMIDE (LASIX) 20 MG TAB    TAKE 1 TABLET BY MOUTH TWICE DAILY    HYDRALAZINE (APRESOLINE) 100 MG TABLET    TAKE 1 TABLET BY MOUTH EVERY 8 HOURS    ISOSORBIDE MONONITRATE SR (IMDUR) 60 MG TABLET SR 24 HR    TAKE 1 TABLET BY MOUTH EVERY DAY    LIDOCAINE (ASPERFLEX) 4 % PATCH    Place 1 Patch on the skin every 12 hours.    LOSARTAN (COZAAR) 100 MG TAB    TAKE 1 TABLET BY MOUTH EVERY EVENING    METHOCARBAMOL (ROBAXIN) 500 MG TAB    Take 1 Tablet by mouth 3 times a day.    POLYETHYLENE GLYCOL/LYTES (MIRALAX) 17 G PACK    Mix and drink 1 Packet by mouth 1 time a day as needed (if sennosides and docusate ineffective after 24 hours).    SENNA-DOCUSATE (PERICOLACE OR SENOKOT S) 8.6-50 MG TAB    Take 2 Tablets by mouth 2 times a day.    SPIRONOLACTONE (ALDACTONE) 50 MG TAB    TAKE 1 TABLET BY MOUTH EVERY DAY    TIRZEPATIDE (MOUNJARO) 5 MG/0.5ML SOLUTION PEN-INJECTOR    Inject 5 mg under the skin every 7 days.     ALLERGIES  Patient has no known allergies.      PHYSICAL EXAM  /69   Pulse 84   Resp 15   Ht 2.083 m (6' 10\")   Wt (!) 163 kg (360 lb)   SpO2 93%      Constitutional: Nontoxic appearing. Alert in no apparent distress.  HENT: Normocephalic, Atraumatic. Bilateral external ears normal. Nose normal.  Moist mucous membranes.  Oropharynx clear.  Eyes: Pupils are equal and reactive. Conjunctiva normal.   Neck: Supple, full range of motion  Heart: " Regular rate and rhythm.  No murmurs.  Palpable DP pulses bilaterally.  Lungs: No respiratory distress, normal work of breathing. Lungs clear to auscultation bilaterally.  Abdomen Soft, no distention.  No tenderness to palpation.  Musculoskeletal: Swelling noted overlying the right knee without overlying erythema or warmth.  Limited range of motion due to pain.  Tenderness over the left lateral ankle and left lateral foot.  Skin: Warm, Dry.  No erythema, No rash.   Neurologic: Alert and oriented x3. Moving all extremities spontaneously without focal deficits.  Sensation intact distal to injury.  Psychiatric: Affect normal, Mood normal, Appears appropriate and not intoxicated.       DIAGNOSTIC STUDIES / PROCEDURES    EKG  I have independently interpreted this EKG  Results for orders placed or performed during the hospital encounter of 25   EKG (Now)   Result Value Ref Range    Report       [facility] Emergency Dept.    Test Date:  2025  Pt Name:    GEETHA BLANCO                  Department: ER  MRN:        [id number]                      Room:       [facility]  Gender:     Male                         Technician: [id number]  :        1975                   Requested By:DIPTI CAMARILLO  Order #:    [id number]                    Reading MD: Dipti Camarillo MD    Measurements  Intervals                                Axis  Rate:       86                           P:          42  MD:         190                          QRS:        49  QRSD:       106                          T:          36  QT:         380  QTc:        455    Interpretive Statements  Sinus rhythm  Normal intervals, no ectopy  No ST or T wave change  Compared to ECG 2023 23:21:17  No significant change from prior  Electronically Signed On 2025 20:06:24 PDT by Dipti Camarillo MD         LABS  Labs Reviewed   CBC WITH DIFFERENTIAL - Abnormal; Notable for the following components:       Result Value    Platelet Count 162  (*)     Neutrophils-Polys 76.70 (*)     Lymphocytes 14.80 (*)     Neutrophils (Absolute) 8.28 (*)     All other components within normal limits   COMP METABOLIC PANEL - Abnormal; Notable for the following components:    Potassium 3.3 (*)     Co2 19 (*)     Glucose 119 (*)     Calcium 8.4 (*)     All other components within normal limits   TROPONIN   ESTIMATED GFR         RADIOLOGY  I have independently interpreted the diagnostic imaging associated with this visit and am waiting the final reading from the radiologist.   My preliminary interpretation is as follows: no fracture    Radiologist interpretation:  DX-FOOT-COMPLETE 3+ LEFT   Final Result      1.  No radiographic evidence of acute injury.   2.  Biomechanical deformities of the left toes.   3.  Old healed left fifth metatarsal neck fracture.      DX-ANKLE 3+ VIEWS LEFT   Final Result      1.  No acute fracture or dislocation.   2.  Fractured syndesmotic screw with some surrounding lucency.   3.  Mild asymmetry of the ankle mortise with some widening of the medial clear space.            DX-KNEE 3 VIEWS RIGHT   Final Result      Tricompartmental degenerative changes, moderately advanced in the lateral compartment. Small joint effusion. No acute fracture or dislocation.      DX-KNEE 3 VIEWS LEFT   Final Result      1.  No radiographic evidence of acute injury.   2.  Moderate osteoarthritic changes of the medial compartment.   3.  Chondrocalcinosis.      DX-CHEST-PORTABLE (1 VIEW)   Final Result      No acute cardiopulmonary abnormality.            COURSE & MEDICAL DECISION MAKING    6:10 PM - Patient seen and examined at bedside. Discussed plan of care, including performing an EKG, lab work and imaging. Patient agrees to the plan of care. The patient will be resuscitated with 1L NS IV. Ordered for EKG, Troponin, CMP, CBC w/ Diff., DX-Chest, DX-Knee (Right), DX-Knee (Left), Dx-Foot (Left) and DX-Ankle (Left) to evaluate his symptoms.     Hydration: Based on the  patient's presentation of Dehydration the patient was given IV fluids. IV Hydration was used because oral hydration was not adequate alone. Upon recheck following hydration, the patient was improved.    ASSESSMENT, COURSE AND PLAN  Care Narrative: Patient with history of hypertension, heart failure who presents following a syncopal episode which occurred yesterday.  The patient was fasting to lose weight and states he became dizzy and passed out and during this episode injured his right knee.  He has some pain to the left ankle and left foot as well. He has normal vital signs on arrival.  His EKG does not demonstrate evidence of ischemia or arrhythmia.  Labs are reassuring without leukocytosis, renal dysfunction or electrolyte abnormality other than mildly low potassium.  He does appear dehydrated on exam therefore will be given IV fluids and this is likely the cause of his syncope.  Considered neuroimaging however there was no history of head trauma.  X-rays of the left foot and ankle show evidence of a fractured screw which the patient states he was aware of and chronic.  He has some asymmetry to the ankle mortise that could be concerning for ligamentous injury however also may be a chronic finding.  His pain is mainly localized to the right knee which x-ray shows no evidence of fracture or dislocation.  he does have a small joint effusion and evidence of severe arthritis.  No evidence of neurovascular compromise or signs of septic arthritis.  We discussed that he may have a ligamentous injury to the knee therefore he will be placed in a knee immobilizer with instructions on outpatient follow-up with orthopedic surgery.    - Upon reassessment, patient is resting comfortably with normal vital signs.  No new complaints at this time.  Following pain medication and a knee immobilizer and a walker, patient is able to get around on his own.  He will be discharged home with instructions on outpatient orthopedic follow-up  discussed results with patient and/or family as well as importance of primary care follow up.  Patient understands plan of care and strict return precautions for new or changing symptoms    ADDITIONAL PROBLEM LIST  Problem #1: Acute right knee injury -discharge home with knee immobilizer, ibuprofen and oxycodone for pain, outpatient referral to orthopedics placed for MRI    Problem #2: Syncope -workup reassuring, likely related to dehydration    Problem #3: Dehydration -given IV fluids    DISPOSITION AND DISCUSSIONS    Escalation of care considered, and ultimately not performed:diagnostic imaging      DISPOSITION:  Patient will be discharged home in stable condition.    FOLLOW UP:  Christoph Hernandez M.D.  [address] [facility] [id number] [phone]    Schedule an appointment as soon as possible for a visit   orthopedic follow up    [facility], Emergency Dept  [address] [facility] [address] [phone]    If symptoms worsen      OUTPATIENT MEDICATIONS:  New Prescriptions    IBUPROFEN (MOTRIN) 600 MG TAB    Take 1 Tablet by mouth every 6 hours as needed for Mild Pain or Moderate Pain.    OXYCODONE IMMEDIATE-RELEASE (ROXICODONE) 5 MG TAB    Take 1 Tablet by mouth every 6 hours as needed for Severe Pain for up to 5 days.         FINAL DIAGNOSIS  1. Sprain of right knee, unspecified ligament, initial encounter    2. Obesity, Class III, BMI 40-49.9 (morbid obesity)    3. Dehydration    4. Syncope, unspecified syncope type      In prescribing controlled substances to this patient, I certify that I have obtained and reviewed the medical history of Gage Garcia. I have also made a good maryjane effort to obtain applicable records from other providers who have treated the patient and records did not demonstrate any increased risk of substance abuse that would prevent me from prescribing controlled substances.     I have conducted a physical exam and documented it. I have reviewed  Mr. Garcia’s prescription history as maintained by the [place] Prescription Monitoring Program.     I have assessed the patient’s risk for abuse, dependency, and addiction using the validated Opioid Risk Tool available at https://www.mdcalc.com/coenvp-jeyr-fzlh-ort-narcotic-abuse.     Given the above, I believe the benefits of controlled substance therapy outweigh the risks. The reasons for prescribing controlled substances include non-narcotic, oral analgesic alternatives have been inadequate for pain control. Accordingly, I have discussed the risk and benefits, treatment plan, and alternative therapies with the patient.       The note accurately reflects work and decisions made by me.  Dipti Giron M.D.  6/30/2025  10:54 PM     ITeresita (Scribe), am scribing for, and in the presence of, Dipti Giron M.D..    Electronically signed by: Teresita Vargas ([email or web address]), 6/30/2025    IDipti M.D. personally performed the services described in this documentation, as scribed by Teresita Vargas in my presence, and it is both accurate and complete.             [1]   Past Medical History:  Diagnosis Date    Hypertension     Obesity     Smoker     Strep pharyngitis    [2]   Past Surgical History:  Procedure Laterality Date    WOUND IRRIGATION & DEBRIDEMENT Left 3/14/2023    Procedure: IRRIGATION AND DEBRIDEMENT, LEFT ANKLE;  Surgeon: Jorge Persaud M.D.;  Location: SURGERY [facility];  Service: Orthopedics    ORIF, ANKLE Left 2/4/2023    Procedure: ORIF, ANKLE;  Surgeon: Jorge Persaud M.D.;  Location: SURGERY [facility];  Service: Orthopedics

## 2025-07-01 NOTE — DISCHARGE INSTRUCTIONS
You were seen in the Emergency Department for syncope and knee injury.    EKG, labs were completed without significant acute abnormalities.  XRays were negative for fracture however did show evidence of severe arthritis and there is a possibility of ligament injury.    Please use 1,000mg of tylenol or 600mg of ibuprofen every 6 hours as needed for pain. Take stronger pain medications as needed for severe pain.    Please follow up with orthopedic surgery as you likely need MRI.    Return to the Emergency Department with uncontrolled pain, inability to ambulate, fevers, or other concerns.

## 2025-07-01 NOTE — ED NOTES
Pt has right knee immobilizer in place. He has been provided a walk by traction. He is ambulatory with walker. Does requires some coaching but feel she will be able to get home and care for self. He has friend he will be able to help.

## 2025-07-01 NOTE — PROGRESS NOTES
Bariatric FWW fit to pt and left at bedside. Pt instructed on use and adjustments if necessary after dc. All relevant questions answered at this time.    Contact traction for any questions or concerns regarding this DME.

## 2025-07-01 NOTE — ED NOTES
Pt was escorted to lobby with walker. DC instructions provided for knee strain. Follow up discussed. Prescriptions medications provided via meds to beds and handed to patient in sealed pharmacy bag. A Work excuse has been provided.

## 2025-07-01 NOTE — DISCHARGE PLANNING
Medical Social Work    MSW received a voalte message from bedside RN that pt is in need of a ride home.  Chart review completed and pt has not received transportation assistance in the past.  Pt verified home address: [address] [place] [address], [facility]. [address] [place] [address].  Pt has knee immobilizer and a walker.  Pt does not qualify for MTM and has received no transportation assistance in the past.  Pt was provided with cab voucher (#[id number]) to return home safely.  RN updated.

## 2025-07-08 NOTE — Clinical Note
REFERRAL APPROVAL NOTICE         Sent on July 8, 2025                   Gage Garcia  [address]  [facility] [address]                   Dear Mr. Garcia,    After a careful review of the medical information and benefit coverage, [facility] has processed your referral. See below for additional details.    If applicable, you must be actively enrolled with your insurance for coverage of the authorized service. If you have any questions regarding your coverage, please contact your insurance directly.    REFERRAL INFORMATION   Referral #:  [id number]  Referred-To Department    Referred-By Provider:  Orthopedics    Dipti Giron M.D.   [facility]      [address] [place] [address] Emergency Room  [address]  [facility] [id number] [phone] [address] [place] [id number]  [phone]    Referral Start Date:  06/30/2025  Referral End Date:   06/30/2026             SCHEDULING  If you do not already have an appointment, please call [phone] to make an appointment.     MORE INFORMATION  If you do not already have a [email or web address] account, sign up at: [email or web address]  You can access your medical information, make appointments, see lab results, billing information, and more.  If you have questions regarding this referral, please contact  the [facility] Referrals department at:             [phone]. Monday - Friday 8:00AM - 5:00PM.     Sincerely,    [facility]

## 2025-08-27 DIAGNOSIS — I50.22 CHRONIC SYSTOLIC CHF (CONGESTIVE HEART FAILURE) (HCC): ICD-10-CM

## 2025-08-27 RX ORDER — SPIRONOLACTONE 50 MG/1
50 TABLET, FILM COATED ORAL DAILY
Qty: 90 TABLET | Refills: 0 | Status: SHIPPED | OUTPATIENT
Start: 2025-08-27

## (undated) DEVICE — SODIUM CHL IRRIGATION 0.9% 1000ML (12EA/CA)

## (undated) DEVICE — ELECTRODE DUAL RETURN W/ CORD - (50/PK)

## (undated) DEVICE — SENSOR OXIMETER ADULT SPO2 RD SET (20EA/BX)

## (undated) DEVICE — SUTURE GENERAL

## (undated) DEVICE — TOWELS CLOTH SURGICAL - (4/PK 20PK/CA)

## (undated) DEVICE — GLOVE BIOGEL SZ 7.5 SURGICAL PF LTX - (50PR/BX 4BX/CA)

## (undated) DEVICE — SET EXTENSION WITH 2 PORTS (48EA/CA) ***PART #2C8610 IS A SUBSTITUTE*****

## (undated) DEVICE — SUCTION INSTRUMENT YANKAUER BULBOUS TIP W/O VENT (50EA/CA)

## (undated) DEVICE — GLOVE BIOGEL INDICATOR SZ 8 SURGICAL PF LTX - (50/BX 4BX/CA)

## (undated) DEVICE — BLADE SURGICAL #10 - (50/BX)

## (undated) DEVICE — DRILL BIT

## (undated) DEVICE — STAPLER SKIN DISP - (6/BX 10BX/CA) VISISTAT

## (undated) DEVICE — BANDAGE ELASTIC 6 HONEYCOMB - 6X5YD LF (20/CA)"

## (undated) DEVICE — DRAPE 36X28IN RAD CARM BND BG - (25/CA) O

## (undated) DEVICE — GOWN WARMING STANDARD FLEX - (30/CA)

## (undated) DEVICE — SLEEVE, VASO, THIGH, MED

## (undated) DEVICE — CANISTER SUCTION 3000ML MECHANICAL FILTER AUTO SHUTOFF MEDI-VAC NONSTERILE LF DISP  (40EA/CA)

## (undated) DEVICE — BANDAGE ELASTIC STERILE MATRIX 6 X 10 (20EA/CA)

## (undated) DEVICE — TUBING CLEARLINK DUO-VENT - C-FLO (48EA/CA)

## (undated) DEVICE — DRAPE LARGE 3 QUARTER - (20/CA)

## (undated) DEVICE — SUTURE ETHILON 2-0 FSLX 30 (36PK/BX)"

## (undated) DEVICE — PACK LOWER EXTREMITY - (2/CA)

## (undated) DEVICE — BLADE SURGICAL #15 - (50/BX 3BX/CA)

## (undated) DEVICE — DRILL BIT 2.8MM X 155MM CALIBRATED (8TX2=16)

## (undated) DEVICE — PAD LAP STERILE 18 X 18 - (5/PK 40PK/CA)

## (undated) DEVICE — CONTAINER, SPECIMEN, STERILE

## (undated) DEVICE — CHLORAPREP 26 ML APPLICATOR - ORANGE TINT(25/CA)

## (undated) DEVICE — PAD PREP 24 X 48 CUFFED - (100/CA)

## (undated) DEVICE — LACTATED RINGERS INJ 1000 ML - (14EA/CA 60CA/PF)

## (undated) DEVICE — SUTURE 2-0 PDSII VIOLET SH 18 IN (12EA/BX)

## (undated) DEVICE — COVER LIGHT HANDLE ALC PLUS DISP (18EA/BX)

## (undated) DEVICE — SUTURE 3-0 ETHILON FS-1 - (36/BX) 30 INCH

## (undated) DEVICE — SET LEADWIRE 5 LEAD BEDSIDE DISPOSABLE ECG (1SET OF 5/EA)

## (undated) DEVICE — PADDING CAST 6 IN STERILE - 6 X 4 YDS (24/CA)

## (undated) DEVICE — SUTURE 2-0 VICRYL PLUS CT-1 - 8 X 18 INCH(12/BX)